# Patient Record
Sex: FEMALE | Race: WHITE | NOT HISPANIC OR LATINO | Employment: OTHER | URBAN - METROPOLITAN AREA
[De-identification: names, ages, dates, MRNs, and addresses within clinical notes are randomized per-mention and may not be internally consistent; named-entity substitution may affect disease eponyms.]

---

## 2018-06-20 ENCOUNTER — OFFICE VISIT (OUTPATIENT)
Dept: INTERNAL MEDICINE | Age: 33
End: 2018-06-20

## 2018-06-20 VITALS
WEIGHT: 194 LBS | HEART RATE: 86 BPM | TEMPERATURE: 97.7 F | HEIGHT: 65 IN | DIASTOLIC BLOOD PRESSURE: 82 MMHG | SYSTOLIC BLOOD PRESSURE: 148 MMHG | BODY MASS INDEX: 32.32 KG/M2 | OXYGEN SATURATION: 99 %

## 2018-06-20 DIAGNOSIS — F41.9 ANXIETY: ICD-10-CM

## 2018-06-20 DIAGNOSIS — M79.10 MYALGIA: ICD-10-CM

## 2018-06-20 DIAGNOSIS — R53.83 FATIGUE, UNSPECIFIED TYPE: Primary | ICD-10-CM

## 2018-06-20 PROCEDURE — 99203 OFFICE O/P NEW LOW 30 MIN: CPT | Performed by: NURSE PRACTITIONER

## 2018-06-20 RX ORDER — PROPRANOLOL HYDROCHLORIDE 10 MG/1
TABLET ORAL
Refills: 1 | COMMUNITY
Start: 2018-05-24 | End: 2021-03-14

## 2018-06-20 NOTE — PROGRESS NOTES
"[facility] [place]      Libby Huerta / 33 y.o. / female  06/20/2018      ASSESSMENT & PLAN:    Problem List Items Addressed This Visit     None      Visit Diagnoses     Fatigue, unspecified type    -  Primary    Relevant Orders    CBC & Differential    Comprehensive Metabolic Panel    TSH+Free T4    Vitamin D 25 Hydroxy    CHRIS    C-reactive Protein    Rheumatoid Factor    Sedimentation Rate    Anxiety        Relevant Orders    CBC & Differential    Comprehensive Metabolic Panel    TSH+Free T4    Vitamin D 25 Hydroxy    CHRIS    C-reactive Protein    Rheumatoid Factor    Sedimentation Rate    Myalgia        Relevant Orders    CBC & Differential    Comprehensive Metabolic Panel    TSH+Free T4    Vitamin D 25 Hydroxy    CHRIS    C-reactive Protein    Rheumatoid Factor    Sedimentation Rate        Orders Placed This Encounter   Procedures   • Comprehensive Metabolic Panel   • TSH+Free T4   • Vitamin D 25 Hydroxy   • CHRIS   • C-reactive Protein   • Rheumatoid Factor   • Sedimentation Rate   • CBC & Differential     No orders of the defined types were placed in this encounter.      Summary/Discussion:    1. Fatigue, unspecified type  Patient with multiple non-specific complaints of fatigue, myalgias, and generalized \"not feeling well\" for many months now. She has previously been diagnosed with anxiety and then bipolar disorder, but this was approximately 8-9 years ago since she has had any regular medical care. Discussed with patient Will obtain basic lab work today, including CBC, CMP, thyroid studies, vitamin D, inflammatory markers, and autoimmune markers to rule out any significant autoimmune or inflammatory process.  Discussed with patient that should her labs show no specific abnormalities, may need to consider further psychiatric evaluation and possible referral, which patient agrees to. Further evaluation and treatment pending results of labs.     - CBC & Differential  - Comprehensive Metabolic Panel  - TSH+Free " "T4  - Vitamin D 25 Hydroxy  - CHRIS  - C-reactive Protein  - Rheumatoid Factor  - Sedimentation Rate    2. Anxiety    - CBC & Differential  - Comprehensive Metabolic Panel  - TSH+Free T4  - Vitamin D 25 Hydroxy  - CHRIS  - C-reactive Protein  - Rheumatoid Factor  - Sedimentation Rate    3. Myalgia    - CBC & Differential  - Comprehensive Metabolic Panel  - TSH+Free T4  - Vitamin D 25 Hydroxy  - CHRIS  - C-reactive Protein  - Rheumatoid Factor  - Sedimentation Rate        Return in about 2 weeks (around 7/4/2018) for Next scheduled follow up.    ____________________________________________________________________    VITALS:    Visit Vitals  /82   Pulse 86   Temp 97.7 °F (36.5 °C)   Ht 165.1 cm (65\")   Wt 88 kg (194 lb)   SpO2 99%   BMI 32.28 kg/m²       BP Readings from Last 3 Encounters:   06/20/18 148/82     Wt Readings from Last 3 Encounters:   06/20/18 88 kg (194 lb)      Body mass index is 32.28 kg/m².    CC: Main reason(s) for today's visit: Establish Care (new pt to establish care) and Anxiety (pt reports anxiety attack x 1 month, w/ N/V, LOC, head injury; pt reports h/o anxiety; pt has been evaluated at [facility], who thought it might be thyroid related, but has not been tested yet d/t insurance)      HPI:    Patient is a 33 y.o. female who is here to establish care. Previous PCP was Dr. Medeiros in [place]. She had previously been placed on antidepressants in the past, but did not want to continue because it made her \"feel numb\". She also reports she left this practice as she was told that she had bipolar disorder and was started on lithium, but she did not agree with the provider's diagnosis.     Patient reports history of anxiety issues, cold intolerance, fatigue, chronic episodes of \"just feeling bad\". She reports a recent anxiety/panic attack that caused her to feel weak and nauseous and she reports that she passed out while standing over the kitchen sink and woke up and she had vomited on herself. She " is not sure how long her LOC lasted. She was seen recently by a provider [facility] and was told by the provider that she needed to have her thyroid levels checked as a possible cause of her depression and anxiety and her recent episode of blacking out.       Patient Care Team:  JOSE Clayton as PCP - General (Internal Medicine)  ____________________________________________________________________      REVIEW OF SYSTEMS    Review of Systems   Constitutional: Positive for fatigue. Negative for chills, fever and unexpected weight change.   Eyes: Negative for visual disturbance.   Respiratory: Negative for cough and shortness of breath.    Cardiovascular: Negative for chest pain, palpitations and leg swelling.   Gastrointestinal: Positive for nausea. Negative for abdominal pain, constipation and diarrhea.   Endocrine: Positive for cold intolerance. Negative for heat intolerance, polydipsia, polyphagia and polyuria.   Genitourinary: Negative for frequency and urgency.   Musculoskeletal: Positive for myalgias (intermittent aches in AM, usually to bilateral legs, not localized to any specific joints. ). Negative for arthralgias, back pain, neck pain and neck stiffness.   Skin: Negative for rash.   Neurological: Positive for headaches (3-5x/ weekly, chronic ). Negative for dizziness, tremors, seizures, syncope, facial asymmetry, speech difficulty, weakness, light-headedness and numbness.         PHYSICAL EXAMINATION    Physical Exam   Constitutional: She is oriented to person, place, and time. Vital signs are normal. She appears well-developed and well-nourished. She is cooperative. She does not appear ill. No distress.   Neck: No thyroid mass and no thyromegaly present.   Cardiovascular: Normal rate, regular rhythm, S1 normal, S2 normal and normal heart sounds.    No murmur heard.  Pulmonary/Chest: Effort normal and breath sounds normal. She has no decreased breath sounds. She has no  wheezes. She has no rhonchi. She has no rales.   Neurological: She is alert and oriented to person, place, and time.   Skin: Skin is warm, dry and intact.   Psychiatric: Her speech is normal and behavior is normal. Judgment and thought content normal. Her mood appears anxious. Cognition and memory are normal.   Nursing note and vitals reviewed.      REVIEWED DATA:    Labs:   No results found for: NA, K, AST, ALT, BUN, CREATININE, EGFRIFNONA, EGFRIFAFRI    No results found for: GLUCOSE, HGBA1C, MICROALBUR    No results found for: LDL, HDL, TRIG, CHOLHDLRATIO    No results found for: TSH, FREET4     No results found for: WBC, HGB, PLT      Imaging:        Medical Tests:        Summary of old records / correspondence / consultant report:        Request outside records:        ______________________________________________________________________    ALLERGIES  No Known Allergies     MEDICATIONS  Current Outpatient Prescriptions   Medication Sig Dispense Refill   • propranolol (INDERAL) 10 MG tablet TK 1 T PO BID PRN  1     No current facility-administered medications for this visit.        PFSH:     The following portions of the patient's history were reviewed and updated as appropriate: Allergies / Current Medications / Past Medical History / Surgical History / Social History / Family History    PROBLEM LIST   There is no problem list on file for this patient.      PAST MEDICAL HISTORY  Past Medical History:   Diagnosis Date   • Anxiety    • Asthma     childhood asthma       SURGICAL HISTORY  Past Surgical History:   Procedure Laterality Date   • LASIK     • TONSILLECTOMY         SOCIAL HISTORY  Social History     Social History   • Marital status: Single     Social History Main Topics   • Smoking status: Former Smoker   • Smokeless tobacco: Never Used   • Alcohol use 0.6 oz/week     1 Glasses of wine per week      Comment: rare   • Drug use: No   • Sexual activity: Yes     Partners: Male     Other Topics Concern   •  Not on file       FAMILY HISTORY  Family History   Problem Relation Age of Onset   • Celiac disease Mother    • Cancer Mother    • Cervical cancer Mother    • Thyroid disease Maternal Grandmother    • Crohn's disease Maternal Grandfather          **Cyn Disclaimer:   Much of this encounter note is an electronic transcription/translation of spoken language to printed text. The electronic translation of spoken language may permit erroneous, or at times, nonsensical words or phrases to be inadvertently transcribed. Although I have reviewed the note for such errors, some may still exist.

## 2018-06-20 NOTE — PATIENT INSTRUCTIONS
Fatigue    Fatigue is feeling tired all of the time, a lack of energy, or a lack of motivation. Occasional or mild fatigue is often a normal response to activity or life in general. However, long-lasting (chronic) or extreme fatigue may indicate an underlying medical condition.  Follow these instructions at home:  Watch your fatigue for any changes. The following actions may help to lessen any discomfort you are feeling:  · Talk to your health care provider about how much sleep you need each night. Try to get the required amount every night.  · Take medicines only as directed by your health care provider.  · Eat a healthy and nutritious diet. Ask your health care provider if you need help changing your diet.  · Drink enough fluid to keep your urine clear or pale yellow.  · Practice ways of relaxing, such as yoga, meditation, massage therapy, or acupuncture.  · Exercise regularly.  · Change situations that cause you stress. Try to keep your work and personal routine reasonable.  · Do not abuse illegal drugs.  · Limit alcohol intake to no more than 1 drink per day for nonpregnant women and 2 drinks per day for men. One drink equals 12 ounces of beer, 5 ounces of wine, or 1½ ounces of hard liquor.  · Take a multivitamin, if directed by your health care provider.    Contact a health care provider if:  · Your fatigue does not get better.  · You have a fever.  · You have unintentional weight loss or gain.  · You have headaches.  · You have difficulty:  ? Falling asleep.  ? Sleeping throughout the night.  · You feel angry, guilty, anxious, or sad.  · You are unable to have a bowel movement (constipation).  · You skin is dry.  · Your legs or another part of your body is swollen.  Get help right away if:  · You feel confused.  · Your vision is blurry.  · You feel faint or pass out.  · You have a severe headache.  · You have severe abdominal, pelvic, or back pain.  · You have chest pain, shortness of breath, or an irregular  or fast heartbeat.  · You are unable to urinate or you urinate less than normal.  · You develop abnormal bleeding, such as bleeding from the rectum, vagina, nose, lungs, or nipples.  · You vomit blood.  · You have thoughts about harming yourself or committing suicide.  · You are worried that you might harm someone else.  This information is not intended to replace advice given to you by your health care provider. Make sure you discuss any questions you have with your health care provider.  Document Released: 10/14/2008 Document Revised: 05/25/2017 Document Reviewed: 04/21/2015  [employer] Interactive Patient Education © 2018 [employer]

## 2018-06-21 DIAGNOSIS — M35.3 POLYMYALGIA (HCC): ICD-10-CM

## 2018-06-21 DIAGNOSIS — R53.83 FATIGUE, UNSPECIFIED TYPE: Primary | ICD-10-CM

## 2018-06-21 DIAGNOSIS — R76.8 ELEVATED RHEUMATOID FACTOR: ICD-10-CM

## 2018-06-21 LAB
25(OH)D3+25(OH)D2 SERPL-MCNC: 49 NG/ML (ref 30–100)
ALBUMIN SERPL-MCNC: 4.4 G/DL (ref 3.5–5.2)
ALBUMIN/GLOB SERPL: 1.4 G/DL
ALP SERPL-CCNC: 49 U/L (ref 39–117)
ALT SERPL-CCNC: 15 U/L (ref 1–33)
ANA SER QL: NEGATIVE
AST SERPL-CCNC: 12 U/L (ref 1–32)
BASOPHILS # BLD AUTO: 0.02 10*3/MM3 (ref 0–0.2)
BASOPHILS NFR BLD AUTO: 0.2 % (ref 0–1.5)
BILIRUB SERPL-MCNC: 0.4 MG/DL (ref 0.1–1.2)
BUN SERPL-MCNC: 12 MG/DL (ref 6–20)
BUN/CREAT SERPL: 14.8 (ref 7–25)
CALCIUM SERPL-MCNC: 9.4 MG/DL (ref 8.6–10.5)
CHLORIDE SERPL-SCNC: 103 MMOL/L (ref 98–107)
CO2 SERPL-SCNC: 23 MMOL/L (ref 22–29)
CREAT SERPL-MCNC: 0.81 MG/DL (ref 0.57–1)
CRP SERPL-MCNC: 0.64 MG/DL (ref 0–0.5)
EOSINOPHIL # BLD AUTO: 0.32 10*3/MM3 (ref 0–0.7)
EOSINOPHIL NFR BLD AUTO: 3.7 % (ref 0.3–6.2)
ERYTHROCYTE [DISTWIDTH] IN BLOOD BY AUTOMATED COUNT: 12.9 % (ref 11.7–13)
ERYTHROCYTE [SEDIMENTATION RATE] IN BLOOD BY WESTERGREN METHOD: 20 MM/HR (ref 0–20)
GFR SERPLBLD CREATININE-BSD FMLA CKD-EPI: 100 ML/MIN/1.73
GFR SERPLBLD CREATININE-BSD FMLA CKD-EPI: 82 ML/MIN/1.73
GLOBULIN SER CALC-MCNC: 3.1 GM/DL
GLUCOSE SERPL-MCNC: 90 MG/DL (ref 65–99)
HCT VFR BLD AUTO: 42.7 % (ref 35.6–45.5)
HGB BLD-MCNC: 13.5 G/DL (ref 11.9–15.5)
IMM GRANULOCYTES # BLD: 0 10*3/MM3 (ref 0–0.03)
IMM GRANULOCYTES NFR BLD: 0 % (ref 0–0.5)
LYMPHOCYTES # BLD AUTO: 2.26 10*3/MM3 (ref 0.9–4.8)
LYMPHOCYTES NFR BLD AUTO: 26.1 % (ref 19.6–45.3)
MCH RBC QN AUTO: 30.5 PG (ref 26.9–32)
MCHC RBC AUTO-ENTMCNC: 31.6 G/DL (ref 32.4–36.3)
MCV RBC AUTO: 96.4 FL (ref 80.5–98.2)
MONOCYTES # BLD AUTO: 0.59 10*3/MM3 (ref 0.2–1.2)
MONOCYTES NFR BLD AUTO: 6.8 % (ref 5–12)
NEUTROPHILS # BLD AUTO: 5.46 10*3/MM3 (ref 1.9–8.1)
NEUTROPHILS NFR BLD AUTO: 63.2 % (ref 42.7–76)
PLATELET # BLD AUTO: 310 10*3/MM3 (ref 140–500)
POTASSIUM SERPL-SCNC: 4.9 MMOL/L (ref 3.5–5.2)
PROT SERPL-MCNC: 7.5 G/DL (ref 6–8.5)
RBC # BLD AUTO: 4.43 10*6/MM3 (ref 3.9–5.2)
RHEUMATOID FACT SERPL-ACNC: 22.1 IU/ML (ref 0–13.9)
SODIUM SERPL-SCNC: 141 MMOL/L (ref 136–145)
T4 FREE SERPL-MCNC: 1.19 NG/DL (ref 0.93–1.7)
TSH SERPL DL<=0.005 MIU/L-ACNC: 2.21 MIU/ML (ref 0.27–4.2)
WBC # BLD AUTO: 8.65 10*3/MM3 (ref 4.5–10.7)

## 2018-07-05 ENCOUNTER — OFFICE VISIT (OUTPATIENT)
Dept: INTERNAL MEDICINE | Age: 33
End: 2018-07-05

## 2018-07-05 VITALS
WEIGHT: 192.4 LBS | HEIGHT: 65 IN | OXYGEN SATURATION: 99 % | SYSTOLIC BLOOD PRESSURE: 130 MMHG | DIASTOLIC BLOOD PRESSURE: 82 MMHG | HEART RATE: 81 BPM | TEMPERATURE: 97 F | BODY MASS INDEX: 32.06 KG/M2

## 2018-07-05 DIAGNOSIS — F41.9 ANXIETY: ICD-10-CM

## 2018-07-05 DIAGNOSIS — R79.82 ELEVATED C-REACTIVE PROTEIN (CRP): Primary | ICD-10-CM

## 2018-07-05 DIAGNOSIS — R76.8 RHEUMATOID FACTOR POSITIVE: ICD-10-CM

## 2018-07-05 PROCEDURE — 99214 OFFICE O/P EST MOD 30 MIN: CPT | Performed by: NURSE PRACTITIONER

## 2018-07-05 NOTE — PROGRESS NOTES
"Subjective   Libby Huerta is a 33 y.o. female.     Anxiety   Presents for follow-up visit. Symptoms include decreased concentration, excessive worry, irritability, nausea, nervous/anxious behavior, obsessions, palpitations and panic. Patient reports no depressed mood, dizziness or suicidal ideas. Symptoms occur constantly. The severity of symptoms is causing significant distress (has been significantly worse over the past 2 months).        She has been treated in the past with oral medication (unsure which SSRI) but stopped taking them on her own because she did not like the way they made her feel \"numb\".   She continues to have symptoms of fatigue, generalized myalgia. She reports having had recent testing last year for celiac disease, and bloodwork was positive. She has not had any further workup at this point by GI.   She has a family history of RA in grandmother, mother has hx of celiac.   Lab work done at last visit demonstrated mild elevation in CRP and positive rheumatoid factor, although other autoimmune markers were negative. Myalgias are generalized, no specific joint aches, no AM pain or swelling of hands or feet.     The following portions of the patient's history were reviewed and updated as appropriate: allergies, current medications, past family history, past medical history, past social history, past surgical history and problem list.    Review of Systems   Constitutional: Positive for fatigue and irritability. Negative for chills and fever.   Cardiovascular: Positive for palpitations.   Gastrointestinal: Positive for diarrhea and nausea. Negative for constipation and vomiting.   Neurological: Negative for dizziness.   Psychiatric/Behavioral: Positive for decreased concentration. Negative for dysphoric mood, self-injury, sleep disturbance, suicidal ideas, depressed mood and stress. The patient is nervous/anxious.        Objective   Physical Exam   Constitutional: She appears well-developed and " well-nourished. She is active. She does not appear ill. No distress.   Cardiovascular: Normal rate, regular rhythm and normal heart sounds.    Pulmonary/Chest: Effort normal and breath sounds normal. She has no decreased breath sounds. She has no wheezes. She has no rhonchi. She has no rales.   Neurological: She is alert.   Psychiatric: Her mood appears anxious.   Nursing note and vitals reviewed.        Assessment/Plan   Problems Addressed this Visit     None      Visit Diagnoses     Elevated C-reactive protein (CRP)    -  Primary    Relevant Orders    Anti-dsDNA Antibodies    Cyclic Citrul Peptide Antibody, IgG / IgA    C4+C3    Ambulatory Referral to Rheumatology    Rheumatoid factor positive        Relevant Orders    Anti-dsDNA Antibodies    Cyclic Citrul Peptide Antibody, IgG / IgA    C4+C3    Ambulatory Referral to Rheumatology    Anxiety        Relevant Medications    sertraline (ZOLOFT) 50 MG tablet    Other Relevant Orders    Ambulatory Referral to Behavioral Health        1. Elevated C-reactive protein (CRP)  Patient with ongoing c/o fatigue, generalized myalgias, family history autoimmune issues and positive RF and CRP. Will obtain further testing today for C3, C4, anti-CCP, Anti-DsDNA (CHRIS negative). I had placed rheumatology referral previously, but they cannot see her until October. I will place another referral today to see if patient can be seen sooner with another provider.     - Anti-dsDNA Antibodies  - Cyclic Citrul Peptide Antibody, IgG / IgA  - C4+C3  - Ambulatory Referral to Rheumatology    2. Rheumatoid factor positive    - Anti-dsDNA Antibodies  - Cyclic Citrul Peptide Antibody, IgG / IgA  - C4+C3  - Ambulatory Referral to Rheumatology    3. Anxiety  Patient has significant anxiety, worse in the past 2 months. She is very hesitant to start medication as she did not respond well in the past. Discussed with patient that I think that she would be best benefited with a combination of  pharmacologic therapy and counseling. Will start low dose Zoloft 25mg, to be increased to 50mg daily as tolerated. Referral placed to behavioral health for counseling in addition to pharmacotherapy.     - Ambulatory Referral to Behavioral Health  - sertraline (ZOLOFT) 50 MG tablet; Take 1 tablet by mouth Daily. Take 1/2 tablet by mouth daily x 7 days, then increase to 1 tablet daily.  Dispense: 30 tablet; Refill: 5

## 2018-07-05 NOTE — PATIENT INSTRUCTIONS
Generalized Anxiety Disorder, Adult    Generalized anxiety disorder (AUSTIN) is a mental health disorder. People with this condition constantly worry about everyday events. Unlike normal anxiety, worry related to AUSTIN is not triggered by a specific event. These worries also do not fade or get better with time. AUSTIN interferes with life functions, including relationships, work, and school.  AUSTIN can vary from mild to severe. People with severe AUSTIN can have intense waves of anxiety with physical symptoms (panic attacks).  What are the causes?  The exact cause of AUSTIN is not known.  What increases the risk?  This condition is more likely to develop in:  · Women.  · People who have a family history of anxiety disorders.  · People who are very shy.  · People who experience very stressful life events, such as the death of a loved one.  · People who have a very stressful family environment.    What are the signs or symptoms?  People with AUSTIN often worry excessively about many things in their lives, such as their health and family. They may also be overly concerned about:  · Doing well at work.  · Being on time.  · Natural disasters.  · Friendships.    Physical symptoms of AUSTIN include:  · Fatigue.  · Muscle tension or having muscle twitches.  · Trembling or feeling shaky.  · Being easily startled.  · Feeling like your heart is pounding or racing.  · Feeling out of breath or like you cannot take a deep breath.  · Having trouble falling asleep or staying asleep.  · Sweating.  · Nausea, diarrhea, or irritable bowel syndrome (IBS).  · Headaches.  · Trouble concentrating or remembering facts.  · Restlessness.  · Irritability.    How is this diagnosed?  Your health care provider can diagnose AUSTIN based on your symptoms and medical history. You will also have a physical exam. The health care provider will ask specific questions about your symptoms, including how severe they are, when they started, and if they come and go. Your health care  provider may ask you about your use of alcohol or drugs, including prescription medicines. Your health care provider may refer you to a mental health specialist for further evaluation.  Your health care provider will do a thorough examination and may perform additional tests to rule out other possible causes of your symptoms.  To be diagnosed with AUSTIN, a person must have anxiety that:  · Is out of his or her control.  · Affects several different aspects of his or her life, such as work and relationships.  · Causes distress that makes him or her unable to take part in normal activities.  · Includes at least three physical symptoms of AUSTIN, such as restlessness, fatigue, trouble concentrating, irritability, muscle tension, or sleep problems.    Before your health care provider can confirm a diagnosis of AUSTIN, these symptoms must be present more days than they are not, and they must last for six months or longer.  How is this treated?  The following therapies are usually used to treat AUSTIN:  · Medicine. Antidepressant medicine is usually prescribed for long-term daily control. Antianxiety medicines may be added in severe cases, especially when panic attacks occur.  · Talk therapy (psychotherapy). Certain types of talk therapy can be helpful in treating AUSTIN by providing support, education, and guidance. Options include:  ? Cognitive behavioral therapy (CBT). People learn coping skills and techniques to ease their anxiety. They learn to identify unrealistic or negative thoughts and behaviors and to replace them with positive ones.  ? Acceptance and commitment therapy (ACT). This treatment teaches people how to be mindful as a way to cope with unwanted thoughts and feelings.  ? Biofeedback. This process trains you to manage your body's response (physiological response) through breathing techniques and relaxation methods. You will work with a therapist while machines are used to monitor your physical symptoms.  · Stress  management techniques. These include yoga, meditation, and exercise.    A mental health specialist can help determine which treatment is best for you. Some people see improvement with one type of therapy. However, other people require a combination of therapies.  Follow these instructions at home:  · Take over-the-counter and prescription medicines only as told by your health care provider.  · Try to maintain a normal routine.  · Try to anticipate stressful situations and allow extra time to manage them.  · Practice any stress management or self-calming techniques as taught by your health care provider.  · Do not punish yourself for setbacks or for not making progress.  · Try to recognize your accomplishments, even if they are small.  · Keep all follow-up visits as told by your health care provider. This is important.  Contact a health care provider if:  · Your symptoms do not get better.  · Your symptoms get worse.  · You have signs of depression, such as:  ? A persistently sad, cranky, or irritable mood.  ? Loss of enjoyment in activities that used to bring you adeline.  ? Change in weight or eating.  ? Changes in sleeping habits.  ? Avoiding friends or family members.  ? Loss of energy for normal tasks.  ? Feelings of guilt or worthlessness.  Get help right away if:  · You have serious thoughts about hurting yourself or others.  If you ever feel like you may hurt yourself or others, or have thoughts about taking your own life, get help right away. You can go to your nearest emergency department or call:  · Your local emergency services (911 in the [place]  · A suicide crisis helpline, such as the National Suicide Prevention Lifeline at 1-293.478.9530. This is open 24 hours a day.    Summary  · Generalized anxiety disorder (AUSTIN) is a mental health disorder that involves worry that is not triggered by a specific event.  · People with AUSTIN often worry excessively about many things in their lives, such as their health and  family.  · AUSTIN may cause physical symptoms such as restlessness, trouble concentrating, sleep problems, frequent sweating, nausea, diarrhea, headaches, and trembling or muscle twitching.  · A mental health specialist can help determine which treatment is best for you. Some people see improvement with one type of therapy. However, other people require a combination of therapies.  This information is not intended to replace advice given to you by your health care provider. Make sure you discuss any questions you have with your health care provider.  Document Released: 04/14/2014 Document Revised: 11/07/2017 Document Reviewed: 11/07/2017  [employer] Interactive Patient Education © 2018 [employer]

## 2018-07-06 DIAGNOSIS — F41.9 ANXIETY: ICD-10-CM

## 2018-07-07 LAB
C3 SERPL-MCNC: 141 MG/DL (ref 82–167)
C4 SERPL-MCNC: 31 MG/DL (ref 14–44)
CCP IGA+IGG SERPL IA-ACNC: 8 UNITS (ref 0–19)

## 2018-07-09 ENCOUNTER — TELEPHONE (OUTPATIENT)
Dept: INTERNAL MEDICINE | Age: 33
End: 2018-07-09

## 2018-07-09 NOTE — TELEPHONE ENCOUNTER
I placed another referral to see if we could get her into different rheumatology, does not necessarily have to be seen by Dr. Esquivel. Can you please look into this for me?

## 2018-07-09 NOTE — TELEPHONE ENCOUNTER
Pt called w/ question re: referral to rheumatology.    Pt is unable to get in to see rheumatologist, Marcelo Esquivel, until Oct.    Please advise.    KD

## 2018-07-10 ENCOUNTER — TELEPHONE (OUTPATIENT)
Dept: INTERNAL MEDICINE | Age: 33
End: 2018-07-10

## 2018-07-10 NOTE — TELEPHONE ENCOUNTER
FYI patient states that you have given her a RX for Sertraline. She said that she isn't going to take it because she doesn't do well with taking an SSRI. She is going to schedule herself with a psychiatrist and see what they say.

## 2018-07-10 NOTE — TELEPHONE ENCOUNTER
[facility] can't see until November. Please advise what you would like for us to do.

## 2021-03-14 ENCOUNTER — ANESTHESIA (OUTPATIENT)
Dept: PERIOP | Facility: HOSPITAL | Age: 36
End: 2021-03-14

## 2021-03-14 ENCOUNTER — APPOINTMENT (OUTPATIENT)
Dept: CT IMAGING | Facility: HOSPITAL | Age: 36
End: 2021-03-14

## 2021-03-14 ENCOUNTER — HOSPITAL ENCOUNTER (INPATIENT)
Facility: HOSPITAL | Age: 36
LOS: 8 days | Discharge: HOME | End: 2021-03-22
Attending: EMERGENCY MEDICINE | Admitting: STUDENT IN AN ORGANIZED HEALTH CARE EDUCATION/TRAINING PROGRAM

## 2021-03-14 ENCOUNTER — ANESTHESIA EVENT (OUTPATIENT)
Dept: PERIOP | Facility: HOSPITAL | Age: 36
End: 2021-03-14

## 2021-03-14 DIAGNOSIS — R09.89 ABNORMAL FINDING OF LUNG: ICD-10-CM

## 2021-03-14 DIAGNOSIS — R10.9 ABDOMINAL PAIN: ICD-10-CM

## 2021-03-14 DIAGNOSIS — K37 APPENDICITIS: ICD-10-CM

## 2021-03-14 DIAGNOSIS — K35.33 ACUTE APPENDICITIS WITH LOCALIZED PERITONITIS AND ABSCESS, WITHOUT GANGRENE, UNSPECIFIED WHETHER PERFORATION PRESENT: Primary | ICD-10-CM

## 2021-03-14 LAB
ALBUMIN SERPL-MCNC: 3.3 G/DL (ref 3.5–5.2)
ALBUMIN/GLOB SERPL: 0.8 G/DL
ALP SERPL-CCNC: 88 U/L (ref 39–117)
ALT SERPL W P-5'-P-CCNC: 26 U/L (ref 1–33)
ANION GAP SERPL CALCULATED.3IONS-SCNC: 11.2 MMOL/L (ref 5–15)
AST SERPL-CCNC: 10 U/L (ref 1–32)
BACTERIA UR QL AUTO: NORMAL /HPF
BASOPHILS # BLD AUTO: 0.07 10*3/MM3 (ref 0–0.2)
BASOPHILS NFR BLD AUTO: 0.4 % (ref 0–1.5)
BILIRUB SERPL-MCNC: 0.9 MG/DL (ref 0–1.2)
BILIRUB UR QL STRIP: NEGATIVE
BUN SERPL-MCNC: 13 MG/DL (ref 6–20)
BUN/CREAT SERPL: 11.7 (ref 7–25)
CALCIUM SPEC-SCNC: 8.9 MG/DL (ref 8.6–10.5)
CHLORIDE SERPL-SCNC: 99 MMOL/L (ref 98–107)
CLARITY UR: CLEAR
CO2 SERPL-SCNC: 23.8 MMOL/L (ref 22–29)
COLOR UR: YELLOW
CREAT SERPL-MCNC: 1.11 MG/DL (ref 0.57–1)
D-LACTATE SERPL-SCNC: 2.3 MMOL/L (ref 0.5–2)
DEPRECATED RDW RBC AUTO: 38.3 FL (ref 37–54)
EOSINOPHIL # BLD AUTO: 0 10*3/MM3 (ref 0–0.4)
EOSINOPHIL NFR BLD AUTO: 0 % (ref 0.3–6.2)
ERYTHROCYTE [DISTWIDTH] IN BLOOD BY AUTOMATED COUNT: 12 % (ref 12.3–15.4)
GFR SERPL CREATININE-BSD FRML MDRD: 57 ML/MIN/1.73
GLOBULIN UR ELPH-MCNC: 4.4 GM/DL
GLUCOSE SERPL-MCNC: 112 MG/DL (ref 65–99)
GLUCOSE UR STRIP-MCNC: NEGATIVE MG/DL
HCG SERPL QL: NEGATIVE
HCT VFR BLD AUTO: 35.1 % (ref 34–46.6)
HGB BLD-MCNC: 11.6 G/DL (ref 12–15.9)
HGB UR QL STRIP.AUTO: ABNORMAL
HYALINE CASTS UR QL AUTO: NORMAL /LPF
IMM GRANULOCYTES # BLD AUTO: 0.11 10*3/MM3 (ref 0–0.05)
IMM GRANULOCYTES NFR BLD AUTO: 0.6 % (ref 0–0.5)
KETONES UR QL STRIP: NEGATIVE
LEUKOCYTE ESTERASE UR QL STRIP.AUTO: NEGATIVE
LIPASE SERPL-CCNC: 19 U/L (ref 13–60)
LYMPHOCYTES # BLD AUTO: 1.24 10*3/MM3 (ref 0.7–3.1)
LYMPHOCYTES NFR BLD AUTO: 6.2 % (ref 19.6–45.3)
MCH RBC QN AUTO: 29.1 PG (ref 26.6–33)
MCHC RBC AUTO-ENTMCNC: 33 G/DL (ref 31.5–35.7)
MCV RBC AUTO: 88 FL (ref 79–97)
MONOCYTES # BLD AUTO: 0.62 10*3/MM3 (ref 0.1–0.9)
MONOCYTES NFR BLD AUTO: 3.1 % (ref 5–12)
NEUTROPHILS NFR BLD AUTO: 17.88 10*3/MM3 (ref 1.7–7)
NEUTROPHILS NFR BLD AUTO: 89.7 % (ref 42.7–76)
NITRITE UR QL STRIP: NEGATIVE
NRBC BLD AUTO-RTO: 0 /100 WBC (ref 0–0.2)
PH UR STRIP.AUTO: 6.5 [PH] (ref 5–8)
PLATELET # BLD AUTO: 397 10*3/MM3 (ref 140–450)
PMV BLD AUTO: 9.6 FL (ref 6–12)
POTASSIUM SERPL-SCNC: 4.1 MMOL/L (ref 3.5–5.2)
PROCALCITONIN SERPL-MCNC: 8.96 NG/ML (ref 0–0.25)
PROT SERPL-MCNC: 7.7 G/DL (ref 6–8.5)
PROT UR QL STRIP: ABNORMAL
RBC # BLD AUTO: 3.99 10*6/MM3 (ref 3.77–5.28)
RBC # UR: NORMAL /HPF
REF LAB TEST METHOD: NORMAL
SARS-COV-2 RNA RESP QL NAA+PROBE: NOT DETECTED
SODIUM SERPL-SCNC: 134 MMOL/L (ref 136–145)
SP GR UR STRIP: 1.03 (ref 1–1.03)
SQUAMOUS #/AREA URNS HPF: NORMAL /HPF
UROBILINOGEN UR QL STRIP: ABNORMAL
WBC # BLD AUTO: 19.92 10*3/MM3 (ref 3.4–10.8)
WBC UR QL AUTO: NORMAL /HPF

## 2021-03-14 PROCEDURE — 88304 TISSUE EXAM BY PATHOLOGIST: CPT | Performed by: STUDENT IN AN ORGANIZED HEALTH CARE EDUCATION/TRAINING PROGRAM

## 2021-03-14 PROCEDURE — 0DTJ4ZZ RESECTION OF APPENDIX, PERCUTANEOUS ENDOSCOPIC APPROACH: ICD-10-PCS | Performed by: STUDENT IN AN ORGANIZED HEALTH CARE EDUCATION/TRAINING PROGRAM

## 2021-03-14 PROCEDURE — U0003 INFECTIOUS AGENT DETECTION BY NUCLEIC ACID (DNA OR RNA); SEVERE ACUTE RESPIRATORY SYNDROME CORONAVIRUS 2 (SARS-COV-2) (CORONAVIRUS DISEASE [COVID-19]), AMPLIFIED PROBE TECHNIQUE, MAKING USE OF HIGH THROUGHPUT TECHNOLOGIES AS DESCRIBED BY CMS-2020-01-R: HCPCS | Performed by: PHYSICIAN ASSISTANT

## 2021-03-14 PROCEDURE — 25010000002 PROPOFOL 10 MG/ML EMULSION: Performed by: ANESTHESIOLOGY

## 2021-03-14 PROCEDURE — 25010000002 SUCCINYLCHOLINE PER 20 MG: Performed by: ANESTHESIOLOGY

## 2021-03-14 PROCEDURE — 25010000002 KETOROLAC TROMETHAMINE PER 15 MG: Performed by: ANESTHESIOLOGY

## 2021-03-14 PROCEDURE — 74177 CT ABD & PELVIS W/CONTRAST: CPT

## 2021-03-14 PROCEDURE — 99223 1ST HOSP IP/OBS HIGH 75: CPT | Performed by: STUDENT IN AN ORGANIZED HEALTH CARE EDUCATION/TRAINING PROGRAM

## 2021-03-14 PROCEDURE — 83690 ASSAY OF LIPASE: CPT | Performed by: PHYSICIAN ASSISTANT

## 2021-03-14 PROCEDURE — 83605 ASSAY OF LACTIC ACID: CPT | Performed by: PHYSICIAN ASSISTANT

## 2021-03-14 PROCEDURE — 25010000002 PIPERACILLIN SOD-TAZOBACTAM PER 1 G: Performed by: PHYSICIAN ASSISTANT

## 2021-03-14 PROCEDURE — 25010000002 LORAZEPAM PER 2 MG: Performed by: EMERGENCY MEDICINE

## 2021-03-14 PROCEDURE — 99284 EMERGENCY DEPT VISIT MOD MDM: CPT

## 2021-03-14 PROCEDURE — 25010000002 FENTANYL CITRATE (PF) 100 MCG/2ML SOLUTION: Performed by: ANESTHESIOLOGY

## 2021-03-14 PROCEDURE — 25010000002 PHENYLEPHRINE PER 1 ML: Performed by: ANESTHESIOLOGY

## 2021-03-14 PROCEDURE — 25010000002 DEXAMETHASONE PER 1 MG: Performed by: ANESTHESIOLOGY

## 2021-03-14 PROCEDURE — 85025 COMPLETE CBC W/AUTO DIFF WBC: CPT | Performed by: PHYSICIAN ASSISTANT

## 2021-03-14 PROCEDURE — 25010000002 HYDROMORPHONE PER 4 MG: Performed by: ANESTHESIOLOGY

## 2021-03-14 PROCEDURE — 80053 COMPREHEN METABOLIC PANEL: CPT | Performed by: PHYSICIAN ASSISTANT

## 2021-03-14 PROCEDURE — 25010000002 ONDANSETRON PER 1 MG: Performed by: ANESTHESIOLOGY

## 2021-03-14 PROCEDURE — 25010000002 MIDAZOLAM PER 1 MG: Performed by: ANESTHESIOLOGY

## 2021-03-14 PROCEDURE — 25010000002 IOPAMIDOL 61 % SOLUTION: Performed by: EMERGENCY MEDICINE

## 2021-03-14 PROCEDURE — 84145 PROCALCITONIN (PCT): CPT | Performed by: PHYSICIAN ASSISTANT

## 2021-03-14 PROCEDURE — 87040 BLOOD CULTURE FOR BACTERIA: CPT | Performed by: PHYSICIAN ASSISTANT

## 2021-03-14 PROCEDURE — 83605 ASSAY OF LACTIC ACID: CPT | Performed by: STUDENT IN AN ORGANIZED HEALTH CARE EDUCATION/TRAINING PROGRAM

## 2021-03-14 PROCEDURE — 36415 COLL VENOUS BLD VENIPUNCTURE: CPT | Performed by: STUDENT IN AN ORGANIZED HEALTH CARE EDUCATION/TRAINING PROGRAM

## 2021-03-14 PROCEDURE — 84703 CHORIONIC GONADOTROPIN ASSAY: CPT | Performed by: PHYSICIAN ASSISTANT

## 2021-03-14 PROCEDURE — 81001 URINALYSIS AUTO W/SCOPE: CPT | Performed by: PHYSICIAN ASSISTANT

## 2021-03-14 PROCEDURE — 44970 LAPAROSCOPY APPENDECTOMY: CPT | Performed by: STUDENT IN AN ORGANIZED HEALTH CARE EDUCATION/TRAINING PROGRAM

## 2021-03-14 DEVICE — THE ECHELON, ECHELON ENDOPATH™ AND ECHELON FLEX™ FAMILIES OF ENDOSCOPIC LINEAR CUTTERS AND RELOADS ARE STERILE, SINGLE PATIENT USE INSTRUMENTS THAT SIMULTANEOUSLY CUT AND STAPLE TISSUE. THERE ARE SIX STAGGERED ROWS OF STAPLES, THREE ON EITHER SIDE OF THE CUT LINE. THE 45 MM INSTRUMENTS HAVE A STAPLE LINE THATIS APPROXIMATELY 45 MM LONG AND A CUT LINE THAT IS APPROXIMATELY 42 MM LONG. THE SHAFT CAN ROTATE FREELY IN BOTH DIRECTIONS AND AN ARTICULATION MECHANISM ON ARTICULATING INSTRUMENTS ENABLES BENDING THE DISTAL PORTIONOF THE SHAFT TO FACILITATE LATERAL ACCESS OF THE OPERATIVE SITE.THE INSTRUMENTS ARE SHIPPED WITHOUT A RELOAD AND MUST BE LOADED PRIOR TO USE. A STAPLE RETAINING CAP ON THE RELOAD PROTECTS THE STAPLE LEG POINTS DURING SHIPPING AND TRANSPORTATION. THE INSTRUMENTS’ LOCK-OUT FEATURE IS DESIGNED TO PREVENT A USED RELOAD FROM BEING REFIRED.
Type: IMPLANTABLE DEVICE | Site: ABDOMEN | Status: FUNCTIONAL
Brand: ECHELON ENDOPATH

## 2021-03-14 RX ORDER — HYDROMORPHONE HCL 110MG/55ML
PATIENT CONTROLLED ANALGESIA SYRINGE INTRAVENOUS AS NEEDED
Status: DISCONTINUED | OUTPATIENT
Start: 2021-03-14 | End: 2021-03-14 | Stop reason: SURG

## 2021-03-14 RX ORDER — SODIUM CHLORIDE, SODIUM LACTATE, POTASSIUM CHLORIDE, CALCIUM CHLORIDE 600; 310; 30; 20 MG/100ML; MG/100ML; MG/100ML; MG/100ML
9 INJECTION, SOLUTION INTRAVENOUS CONTINUOUS
Status: CANCELLED | OUTPATIENT
Start: 2021-03-14

## 2021-03-14 RX ORDER — NALOXONE HCL 0.4 MG/ML
0.2 VIAL (ML) INJECTION AS NEEDED
Status: DISCONTINUED | OUTPATIENT
Start: 2021-03-14 | End: 2021-03-14 | Stop reason: HOSPADM

## 2021-03-14 RX ORDER — MIDAZOLAM HYDROCHLORIDE 1 MG/ML
1 INJECTION INTRAMUSCULAR; INTRAVENOUS
Status: CANCELLED | OUTPATIENT
Start: 2021-03-14

## 2021-03-14 RX ORDER — OXYCODONE AND ACETAMINOPHEN 7.5; 325 MG/1; MG/1
1 TABLET ORAL ONCE AS NEEDED
Status: DISCONTINUED | OUTPATIENT
Start: 2021-03-14 | End: 2021-03-14 | Stop reason: HOSPADM

## 2021-03-14 RX ORDER — FENTANYL CITRATE 50 UG/ML
50 INJECTION, SOLUTION INTRAMUSCULAR; INTRAVENOUS
Status: DISCONTINUED | OUTPATIENT
Start: 2021-03-14 | End: 2021-03-14 | Stop reason: HOSPADM

## 2021-03-14 RX ORDER — FENTANYL CITRATE 50 UG/ML
50 INJECTION, SOLUTION INTRAMUSCULAR; INTRAVENOUS
Status: CANCELLED | OUTPATIENT
Start: 2021-03-14

## 2021-03-14 RX ORDER — IBUPROFEN 800 MG/1
800 TABLET ORAL 3 TIMES DAILY
Status: DISCONTINUED | OUTPATIENT
Start: 2021-03-15 | End: 2021-03-22 | Stop reason: HOSPADM

## 2021-03-14 RX ORDER — SODIUM CHLORIDE 0.9 % (FLUSH) 0.9 %
10 SYRINGE (ML) INJECTION AS NEEDED
Status: DISCONTINUED | OUTPATIENT
Start: 2021-03-14 | End: 2021-03-22 | Stop reason: HOSPADM

## 2021-03-14 RX ORDER — HYDROMORPHONE HYDROCHLORIDE 1 MG/ML
0.5 INJECTION, SOLUTION INTRAMUSCULAR; INTRAVENOUS; SUBCUTANEOUS
Status: DISPENSED | OUTPATIENT
Start: 2021-03-14 | End: 2021-03-21

## 2021-03-14 RX ORDER — DIPHENHYDRAMINE HYDROCHLORIDE 50 MG/ML
12.5 INJECTION INTRAMUSCULAR; INTRAVENOUS
Status: DISCONTINUED | OUTPATIENT
Start: 2021-03-14 | End: 2021-03-14 | Stop reason: HOSPADM

## 2021-03-14 RX ORDER — LIDOCAINE HYDROCHLORIDE 20 MG/ML
INJECTION, SOLUTION INFILTRATION; PERINEURAL AS NEEDED
Status: DISCONTINUED | OUTPATIENT
Start: 2021-03-14 | End: 2021-03-14 | Stop reason: SURG

## 2021-03-14 RX ORDER — ACETAMINOPHEN 500 MG
1000 TABLET ORAL EVERY 8 HOURS
Status: DISCONTINUED | OUTPATIENT
Start: 2021-03-15 | End: 2021-03-18

## 2021-03-14 RX ORDER — FLUMAZENIL 0.1 MG/ML
0.2 INJECTION INTRAVENOUS AS NEEDED
Status: DISCONTINUED | OUTPATIENT
Start: 2021-03-14 | End: 2021-03-14 | Stop reason: HOSPADM

## 2021-03-14 RX ORDER — ONDANSETRON 2 MG/ML
4 INJECTION INTRAMUSCULAR; INTRAVENOUS ONCE AS NEEDED
Status: DISCONTINUED | OUTPATIENT
Start: 2021-03-14 | End: 2021-03-14 | Stop reason: HOSPADM

## 2021-03-14 RX ORDER — SUCCINYLCHOLINE CHLORIDE 20 MG/ML
INJECTION INTRAMUSCULAR; INTRAVENOUS AS NEEDED
Status: DISCONTINUED | OUTPATIENT
Start: 2021-03-14 | End: 2021-03-14 | Stop reason: SURG

## 2021-03-14 RX ORDER — ONDANSETRON 2 MG/ML
4 INJECTION INTRAMUSCULAR; INTRAVENOUS EVERY 6 HOURS PRN
Status: DISCONTINUED | OUTPATIENT
Start: 2021-03-14 | End: 2021-03-22 | Stop reason: HOSPADM

## 2021-03-14 RX ORDER — HYDRALAZINE HYDROCHLORIDE 20 MG/ML
5 INJECTION INTRAMUSCULAR; INTRAVENOUS
Status: DISCONTINUED | OUTPATIENT
Start: 2021-03-14 | End: 2021-03-14 | Stop reason: HOSPADM

## 2021-03-14 RX ORDER — SODIUM CHLORIDE 9 MG/ML
50 INJECTION, SOLUTION INTRAVENOUS CONTINUOUS
Status: DISCONTINUED | OUTPATIENT
Start: 2021-03-15 | End: 2021-03-16

## 2021-03-14 RX ORDER — DIPHENHYDRAMINE HCL 25 MG
25 CAPSULE ORAL
Status: DISCONTINUED | OUTPATIENT
Start: 2021-03-14 | End: 2021-03-14 | Stop reason: HOSPADM

## 2021-03-14 RX ORDER — FAMOTIDINE 10 MG/ML
20 INJECTION, SOLUTION INTRAVENOUS ONCE
Status: CANCELLED | OUTPATIENT
Start: 2021-03-14 | End: 2021-03-14

## 2021-03-14 RX ORDER — ONDANSETRON 2 MG/ML
INJECTION INTRAMUSCULAR; INTRAVENOUS AS NEEDED
Status: DISCONTINUED | OUTPATIENT
Start: 2021-03-14 | End: 2021-03-14 | Stop reason: SURG

## 2021-03-14 RX ORDER — MIDAZOLAM HYDROCHLORIDE 1 MG/ML
INJECTION INTRAMUSCULAR; INTRAVENOUS AS NEEDED
Status: DISCONTINUED | OUTPATIENT
Start: 2021-03-14 | End: 2021-03-14 | Stop reason: SURG

## 2021-03-14 RX ORDER — SODIUM CHLORIDE 0.9 % (FLUSH) 0.9 %
10 SYRINGE (ML) INJECTION EVERY 12 HOURS SCHEDULED
Status: DISCONTINUED | OUTPATIENT
Start: 2021-03-15 | End: 2021-03-22 | Stop reason: HOSPADM

## 2021-03-14 RX ORDER — ROCURONIUM BROMIDE 10 MG/ML
INJECTION, SOLUTION INTRAVENOUS AS NEEDED
Status: DISCONTINUED | OUTPATIENT
Start: 2021-03-14 | End: 2021-03-14 | Stop reason: SURG

## 2021-03-14 RX ORDER — MIDAZOLAM HYDROCHLORIDE 1 MG/ML
2 INJECTION INTRAMUSCULAR; INTRAVENOUS
Status: CANCELLED | OUTPATIENT
Start: 2021-03-14

## 2021-03-14 RX ORDER — FAMOTIDINE 10 MG/ML
INJECTION, SOLUTION INTRAVENOUS AS NEEDED
Status: DISCONTINUED | OUTPATIENT
Start: 2021-03-14 | End: 2021-03-14 | Stop reason: SURG

## 2021-03-14 RX ORDER — ACETAMINOPHEN 500 MG
1000 TABLET ORAL ONCE
Status: COMPLETED | OUTPATIENT
Start: 2021-03-14 | End: 2021-03-14

## 2021-03-14 RX ORDER — HYDROCODONE BITARTRATE AND ACETAMINOPHEN 7.5; 325 MG/1; MG/1
1 TABLET ORAL ONCE AS NEEDED
Status: DISCONTINUED | OUTPATIENT
Start: 2021-03-14 | End: 2021-03-14 | Stop reason: HOSPADM

## 2021-03-14 RX ORDER — GLYCOPYRROLATE 0.2 MG/ML
INJECTION INTRAMUSCULAR; INTRAVENOUS AS NEEDED
Status: DISCONTINUED | OUTPATIENT
Start: 2021-03-14 | End: 2021-03-14 | Stop reason: SURG

## 2021-03-14 RX ORDER — EPHEDRINE SULFATE 50 MG/ML
5 INJECTION, SOLUTION INTRAVENOUS ONCE AS NEEDED
Status: DISCONTINUED | OUTPATIENT
Start: 2021-03-14 | End: 2021-03-14 | Stop reason: HOSPADM

## 2021-03-14 RX ORDER — SODIUM CHLORIDE 0.9 % (FLUSH) 0.9 %
3 SYRINGE (ML) INJECTION EVERY 12 HOURS SCHEDULED
Status: CANCELLED | OUTPATIENT
Start: 2021-03-14

## 2021-03-14 RX ORDER — DEXAMETHASONE SODIUM PHOSPHATE 10 MG/ML
INJECTION INTRAMUSCULAR; INTRAVENOUS AS NEEDED
Status: DISCONTINUED | OUTPATIENT
Start: 2021-03-14 | End: 2021-03-14 | Stop reason: SURG

## 2021-03-14 RX ORDER — FENTANYL CITRATE 50 UG/ML
INJECTION, SOLUTION INTRAMUSCULAR; INTRAVENOUS AS NEEDED
Status: DISCONTINUED | OUTPATIENT
Start: 2021-03-14 | End: 2021-03-14 | Stop reason: SURG

## 2021-03-14 RX ORDER — BUPIVACAINE HYDROCHLORIDE AND EPINEPHRINE 5; 5 MG/ML; UG/ML
INJECTION, SOLUTION PERINEURAL AS NEEDED
Status: DISCONTINUED | OUTPATIENT
Start: 2021-03-14 | End: 2021-03-14 | Stop reason: HOSPADM

## 2021-03-14 RX ORDER — KETOROLAC TROMETHAMINE 30 MG/ML
INJECTION, SOLUTION INTRAMUSCULAR; INTRAVENOUS AS NEEDED
Status: DISCONTINUED | OUTPATIENT
Start: 2021-03-14 | End: 2021-03-14 | Stop reason: SURG

## 2021-03-14 RX ORDER — PROMETHAZINE HYDROCHLORIDE 25 MG/1
25 SUPPOSITORY RECTAL ONCE AS NEEDED
Status: DISCONTINUED | OUTPATIENT
Start: 2021-03-14 | End: 2021-03-14 | Stop reason: HOSPADM

## 2021-03-14 RX ORDER — SODIUM CHLORIDE, SODIUM LACTATE, POTASSIUM CHLORIDE, CALCIUM CHLORIDE 600; 310; 30; 20 MG/100ML; MG/100ML; MG/100ML; MG/100ML
INJECTION, SOLUTION INTRAVENOUS CONTINUOUS PRN
Status: DISCONTINUED | OUTPATIENT
Start: 2021-03-14 | End: 2021-03-14 | Stop reason: SURG

## 2021-03-14 RX ORDER — PROPOFOL 10 MG/ML
VIAL (ML) INTRAVENOUS AS NEEDED
Status: DISCONTINUED | OUTPATIENT
Start: 2021-03-14 | End: 2021-03-14 | Stop reason: SURG

## 2021-03-14 RX ORDER — SCOLOPAMINE TRANSDERMAL SYSTEM 1 MG/1
PATCH, EXTENDED RELEASE TRANSDERMAL AS NEEDED
Status: DISCONTINUED | OUTPATIENT
Start: 2021-03-14 | End: 2021-03-14 | Stop reason: SURG

## 2021-03-14 RX ORDER — LORAZEPAM 2 MG/ML
1 INJECTION INTRAMUSCULAR ONCE
Status: COMPLETED | OUTPATIENT
Start: 2021-03-14 | End: 2021-03-14

## 2021-03-14 RX ORDER — LIDOCAINE HYDROCHLORIDE 10 MG/ML
0.5 INJECTION, SOLUTION EPIDURAL; INFILTRATION; INTRACAUDAL; PERINEURAL ONCE AS NEEDED
Status: CANCELLED | OUTPATIENT
Start: 2021-03-14

## 2021-03-14 RX ORDER — SCOLOPAMINE TRANSDERMAL SYSTEM 1 MG/1
PATCH, EXTENDED RELEASE TRANSDERMAL
Status: COMPLETED
Start: 2021-03-14 | End: 2021-03-14

## 2021-03-14 RX ORDER — HYDROMORPHONE HYDROCHLORIDE 1 MG/ML
0.5 INJECTION, SOLUTION INTRAMUSCULAR; INTRAVENOUS; SUBCUTANEOUS
Status: DISCONTINUED | OUTPATIENT
Start: 2021-03-14 | End: 2021-03-14 | Stop reason: HOSPADM

## 2021-03-14 RX ORDER — PROMETHAZINE HYDROCHLORIDE 25 MG/1
25 TABLET ORAL ONCE AS NEEDED
Status: DISCONTINUED | OUTPATIENT
Start: 2021-03-14 | End: 2021-03-14 | Stop reason: HOSPADM

## 2021-03-14 RX ORDER — SODIUM CHLORIDE 0.9 % (FLUSH) 0.9 %
3-10 SYRINGE (ML) INJECTION AS NEEDED
Status: CANCELLED | OUTPATIENT
Start: 2021-03-14

## 2021-03-14 RX ORDER — FAMOTIDINE 10 MG/ML
INJECTION, SOLUTION INTRAVENOUS
Status: COMPLETED
Start: 2021-03-14 | End: 2021-03-14

## 2021-03-14 RX ORDER — LABETALOL HYDROCHLORIDE 5 MG/ML
5 INJECTION, SOLUTION INTRAVENOUS
Status: DISCONTINUED | OUTPATIENT
Start: 2021-03-14 | End: 2021-03-14 | Stop reason: HOSPADM

## 2021-03-14 RX ADMIN — PHENYLEPHRINE HYDROCHLORIDE 200 MCG: 10 INJECTION INTRAVENOUS at 21:29

## 2021-03-14 RX ADMIN — TAZOBACTAM SODIUM AND PIPERACILLIN SODIUM 3.38 G: 375; 3 INJECTION, SOLUTION INTRAVENOUS at 18:52

## 2021-03-14 RX ADMIN — SODIUM CHLORIDE, POTASSIUM CHLORIDE, SODIUM LACTATE AND CALCIUM CHLORIDE: 600; 310; 30; 20 INJECTION, SOLUTION INTRAVENOUS at 21:48

## 2021-03-14 RX ADMIN — HYDROMORPHONE HYDROCHLORIDE 0.5 MG: 2 INJECTION, SOLUTION INTRAMUSCULAR; INTRAVENOUS; SUBCUTANEOUS at 21:49

## 2021-03-14 RX ADMIN — FENTANYL CITRATE 50 MCG: 50 INJECTION INTRAMUSCULAR; INTRAVENOUS at 21:57

## 2021-03-14 RX ADMIN — GLYCOPYRROLATE 0.2 MG: 0.2 INJECTION INTRAMUSCULAR; INTRAVENOUS at 20:34

## 2021-03-14 RX ADMIN — ROCURONIUM BROMIDE 30 MG: 50 INJECTION INTRAVENOUS at 20:48

## 2021-03-14 RX ADMIN — ROCURONIUM BROMIDE 20 MG: 50 INJECTION INTRAVENOUS at 21:11

## 2021-03-14 RX ADMIN — SUCCINYLCHOLINE CHLORIDE 200 MG: 20 INJECTION, SOLUTION INTRAMUSCULAR; INTRAVENOUS; PARENTERAL at 20:32

## 2021-03-14 RX ADMIN — IOPAMIDOL 85 ML: 612 INJECTION, SOLUTION INTRAVENOUS at 17:47

## 2021-03-14 RX ADMIN — SODIUM CHLORIDE 1000 ML: 9 INJECTION, SOLUTION INTRAVENOUS at 16:54

## 2021-03-14 RX ADMIN — PROPOFOL 200 MG: 10 INJECTION, EMULSION INTRAVENOUS at 20:32

## 2021-03-14 RX ADMIN — KETOROLAC TROMETHAMINE 30 MG: 30 INJECTION, SOLUTION INTRAMUSCULAR at 21:19

## 2021-03-14 RX ADMIN — SCOPALAMINE 1 PATCH: 1 PATCH, EXTENDED RELEASE TRANSDERMAL at 20:26

## 2021-03-14 RX ADMIN — MIDAZOLAM 2 MG: 1 INJECTION INTRAMUSCULAR; INTRAVENOUS at 20:25

## 2021-03-14 RX ADMIN — ACETAMINOPHEN 1000 MG: 500 TABLET, FILM COATED ORAL at 18:49

## 2021-03-14 RX ADMIN — LIDOCAINE HYDROCHLORIDE 100 MG: 20 INJECTION, SOLUTION INFILTRATION; PERINEURAL at 20:32

## 2021-03-14 RX ADMIN — DEXAMETHASONE SODIUM PHOSPHATE 8 MG: 10 INJECTION INTRAMUSCULAR; INTRAVENOUS at 20:54

## 2021-03-14 RX ADMIN — SODIUM CHLORIDE, POTASSIUM CHLORIDE, SODIUM LACTATE AND CALCIUM CHLORIDE: 600; 310; 30; 20 INJECTION, SOLUTION INTRAVENOUS at 21:13

## 2021-03-14 RX ADMIN — HYDROMORPHONE HYDROCHLORIDE 1 MG: 2 INJECTION, SOLUTION INTRAMUSCULAR; INTRAVENOUS; SUBCUTANEOUS at 21:47

## 2021-03-14 RX ADMIN — SODIUM CHLORIDE, POTASSIUM CHLORIDE, SODIUM LACTATE AND CALCIUM CHLORIDE: 600; 310; 30; 20 INJECTION, SOLUTION INTRAVENOUS at 20:25

## 2021-03-14 RX ADMIN — FENTANYL CITRATE 100 MCG: 50 INJECTION INTRAMUSCULAR; INTRAVENOUS at 20:25

## 2021-03-14 RX ADMIN — SODIUM CHLORIDE 1000 ML: 9 INJECTION, SOLUTION INTRAVENOUS at 18:31

## 2021-03-14 RX ADMIN — LORAZEPAM 1 MG: 2 INJECTION INTRAMUSCULAR; INTRAVENOUS at 18:49

## 2021-03-14 RX ADMIN — FENTANYL CITRATE 50 MCG: 50 INJECTION INTRAMUSCULAR; INTRAVENOUS at 21:51

## 2021-03-14 RX ADMIN — SUGAMMADEX 300 MG: 100 INJECTION, SOLUTION INTRAVENOUS at 21:44

## 2021-03-14 RX ADMIN — HYDROMORPHONE HYDROCHLORIDE 0.5 MG: 2 INJECTION, SOLUTION INTRAMUSCULAR; INTRAVENOUS; SUBCUTANEOUS at 21:45

## 2021-03-14 RX ADMIN — ONDANSETRON 4 MG: 2 INJECTION INTRAMUSCULAR; INTRAVENOUS at 21:19

## 2021-03-14 RX ADMIN — FAMOTIDINE 20 MG: 10 INJECTION, SOLUTION INTRAVENOUS at 20:28

## 2021-03-14 NOTE — ED TRIAGE NOTES
Pt reports generalized abd. Pain x 3 days pt states diarrhea started today. denies nausea or vomiting.    Patient masked in first look triage. I was wearing mask and goggles.

## 2021-03-14 NOTE — PROGRESS NOTES
Clinical Pharmacy Services: Medication History    Libby Huerta is a 35 y.o. female presenting to [facility] for   Chief Complaint   Patient presents with   • Abdominal Pain   • Diarrhea       She  has a past medical history of Anxiety and Asthma.    Allergies as of 03/14/2021 - Reviewed 03/14/2021   Allergen Reaction Noted   • Ceclor [cefaclor] Shortness Of Breath 03/14/2021       Medication information was obtained from: Patient and Mom  Pharmacy and Phone Number:   [facility]/pharmacy #[id number] - [place], [place] - [address] - [phone]  - [phone]   [address].  [facility] [address]  Phone: [phone] Fax: [phone]        Prior to Admission Medications     None            Medication notes: Patient states she takes no medications at home.    This medication list is complete to the best of my knowledge as of 3/14/2021    Please call if questions.    Daine Guillermo PharmD Candidate 2021  Medication History Technician  [phone]    3/14/2021 18:48 EDT

## 2021-03-14 NOTE — ED PROVIDER NOTES
EMERGENCY DEPARTMENT ENCOUNTER    Room Number:  02/02  Date of encounter:  3/14/2021  PCP: Kristan Langford APRN  Historian: Patient      I used full protective equipment while examining this patient.  This includes face mask, gloves and protective eyewear.  I washed my hands before entering the room and immediately upon leaving the room      HPI:  Chief Complaint: Abdominal pain, fatigue  A complete HPI/ROS/PMH/PSH/SH/FH are unobtainable due to: Nothing    Context: Libby Huerta is a 35 y.o. female who presents to the ED c/o 4 to 5-day history of fatigue, malaise, progressive abdominal pain and distention, as well as 1 episode of watery diarrhea today.  Patient denies any known fever, chest pain, shortness of breath, vomiting, dysuria, vaginal discharge.  Patient denies any previous similar symptoms.  She denies any ill contacts.  She describes her abdominal pain is generalized and moderate in intensity.  She denies any precipitating alleviating factors.    Review of Medical Records  No previous pertinent medical records.    PAST MEDICAL HISTORY  Active Ambulatory Problems     Diagnosis Date Noted   • No Active Ambulatory Problems     Resolved Ambulatory Problems     Diagnosis Date Noted   • No Resolved Ambulatory Problems     Past Medical History:   Diagnosis Date   • Anxiety    • Asthma          PAST SURGICAL HISTORY  Past Surgical History:   Procedure Laterality Date   • LASIK     • TONSILLECTOMY           FAMILY HISTORY  Family History   Problem Relation Age of Onset   • Celiac disease Mother    • Cancer Mother    • Cervical cancer Mother    • Thyroid disease Maternal Grandmother    • Rheum arthritis Maternal Grandmother    • Crohn's disease Maternal Grandfather          SOCIAL HISTORY  Social History     Socioeconomic History   • Marital status: Single     Spouse name: Not on file   • Number of children: Not on file   • Years of education: Not on file   • Highest education level: Not on file   Tobacco Use    • Smoking status: Former Smoker   • Smokeless tobacco: Never Used   Substance and Sexual Activity   • Alcohol use: Yes     Alcohol/week: 1.0 standard drinks     Types: 1 Glasses of wine per week     Comment: rare   • Drug use: No   • Sexual activity: Yes     Partners: Male         ALLERGIES  Ceclor [cefaclor]        REVIEW OF SYSTEMS  All systems reviewed and negative except for those discussed in HPI.       PHYSICAL EXAM    I have reviewed the triage vital signs and nursing notes.    ED Triage Vitals [03/14/21 1548]   Temp Heart Rate Resp BP SpO2   (!) 100.9 °F (38.3 °C) (!) 151 18 -- 97 %      Temp src Heart Rate Source Patient Position BP Location FiO2 (%)   Tympanic -- -- -- --       Physical Exam  GENERAL: Alert, oriented, mildly ill-appearing, not distressed  HENT: head atraumatic, no nuchal rigidity  EYES: no scleral icterus, EOMI  CV: regular rhythm, regular rate, no murmur  RESPIRATORY: normal effort, CTA  ABDOMEN: soft, moderate diffuse abdominal tenderness without guarding rebound.  No masses.  MUSCULOSKELETAL: no deformity, FROM, no calf swelling or tenderness  NEURO: alert, moves all extremities, follows commands  SKIN: warm, dry        LAB RESULTS  Recent Results (from the past 24 hour(s))   Comprehensive Metabolic Panel    Collection Time: 03/14/21  4:54 PM    Specimen: Blood   Result Value Ref Range    Glucose 112 (H) 65 - 99 mg/dL    BUN 13 6 - 20 mg/dL    Creatinine 1.11 (H) 0.57 - 1.00 mg/dL    Sodium 134 (L) 136 - 145 mmol/L    Potassium 4.1 3.5 - 5.2 mmol/L    Chloride 99 98 - 107 mmol/L    CO2 23.8 22.0 - 29.0 mmol/L    Calcium 8.9 8.6 - 10.5 mg/dL    Total Protein 7.7 6.0 - 8.5 g/dL    Albumin 3.30 (L) 3.50 - 5.20 g/dL    ALT (SGPT) 26 1 - 33 U/L    AST (SGOT) 10 1 - 32 U/L    Alkaline Phosphatase 88 39 - 117 U/L    Total Bilirubin 0.9 0.0 - 1.2 mg/dL    eGFR Non African Amer 57 (L) >60 mL/min/1.73    Globulin 4.4 gm/dL    A/G Ratio 0.8 g/dL    BUN/Creatinine Ratio 11.7 7.0 - 25.0    Anion  Gap 11.2 5.0 - 15.0 mmol/L   Lipase    Collection Time: 03/14/21  4:54 PM    Specimen: Blood   Result Value Ref Range    Lipase 19 13 - 60 U/L   hCG, Serum, Qualitative    Collection Time: 03/14/21  4:54 PM    Specimen: Blood   Result Value Ref Range    HCG Qualitative Negative Negative   CBC Auto Differential    Collection Time: 03/14/21  4:54 PM    Specimen: Blood   Result Value Ref Range    WBC 19.92 (H) 3.40 - 10.80 10*3/mm3    RBC 3.99 3.77 - 5.28 10*6/mm3    Hemoglobin 11.6 (L) 12.0 - 15.9 g/dL    Hematocrit 35.1 34.0 - 46.6 %    MCV 88.0 79.0 - 97.0 fL    MCH 29.1 26.6 - 33.0 pg    MCHC 33.0 31.5 - 35.7 g/dL    RDW 12.0 (L) 12.3 - 15.4 %    RDW-SD 38.3 37.0 - 54.0 fl    MPV 9.6 6.0 - 12.0 fL    Platelets 397 140 - 450 10*3/mm3    Neutrophil % 89.7 (H) 42.7 - 76.0 %    Lymphocyte % 6.2 (L) 19.6 - 45.3 %    Monocyte % 3.1 (L) 5.0 - 12.0 %    Eosinophil % 0.0 (L) 0.3 - 6.2 %    Basophil % 0.4 0.0 - 1.5 %    Immature Grans % 0.6 (H) 0.0 - 0.5 %    Neutrophils, Absolute 17.88 (H) 1.70 - 7.00 10*3/mm3    Lymphocytes, Absolute 1.24 0.70 - 3.10 10*3/mm3    Monocytes, Absolute 0.62 0.10 - 0.90 10*3/mm3    Eosinophils, Absolute 0.00 0.00 - 0.40 10*3/mm3    Basophils, Absolute 0.07 0.00 - 0.20 10*3/mm3    Immature Grans, Absolute 0.11 (H) 0.00 - 0.05 10*3/mm3    nRBC 0.0 0.0 - 0.2 /100 WBC   Procalcitonin    Collection Time: 03/14/21  4:54 PM    Specimen: Blood   Result Value Ref Range    Procalcitonin 8.96 (H) 0.00 - 0.25 ng/mL   Urinalysis With Microscopic If Indicated (No Culture) - Urine, Clean Catch    Collection Time: 03/14/21  6:32 PM    Specimen: Urine, Clean Catch   Result Value Ref Range    Color, UA Yellow Yellow, Straw    Appearance, UA Clear Clear    pH, UA 6.5 5.0 - 8.0    Specific Gravity, UA 1.029 1.005 - 1.030    Glucose, UA Negative Negative    Ketones, UA Negative Negative    Bilirubin, UA Negative Negative    Blood, UA Trace (A) Negative    Protein, UA Trace (A) Negative    Leuk Esterase, UA Negative  Negative    Nitrite, UA Negative Negative    Urobilinogen, UA 0.2 E.U./dL 0.2 - 1.0 E.U./dL   Lactic Acid, Plasma    Collection Time: 03/14/21  6:32 PM    Specimen: Blood   Result Value Ref Range    Lactate 2.3 (C) 0.5 - 2.0 mmol/L   Urinalysis, Microscopic Only - Urine, Clean Catch    Collection Time: 03/14/21  6:32 PM    Specimen: Urine, Clean Catch   Result Value Ref Range    RBC, UA 0-2 None Seen, 0-2 /HPF    WBC, UA 0-2 None Seen, 0-2 /HPF    Bacteria, UA None Seen None Seen /HPF    Squamous Epithelial Cells, UA 0-2 None Seen, 0-2 /HPF    Hyaline Casts, UA None Seen None Seen /LPF    Methodology Manual Light Microscopy        Ordered the above labs and independently reviewed the results.        RADIOLOGY  CT Abdomen Pelvis With Contrast    Result Date: 3/14/2021  CR Patient: JANELLE ROSADO  Time Out: 18:30 Exam(s): CT ABDOMEN + PELVIS With Contrast EXAM:   CT Abdomen and Pelvis With Intravenous Contrast CLINICAL HISTORY:    Reason for exam: abd pain, diarrhea. TECHNIQUE:   Axial computed tomography images of the abdomen and pelvis with intravenous contrast.  CTDI is 28.80 mGy and DLP is 1739.20 mGy-cm.  This CT exam was performed according to the principle of ALARA (As Low As Reasonably Achievable) by using one or more of the following dose reduction techniques: automated exposure control, adjustment of the mA and or kV according to patient size, and or use of iterative reconstruction technique. COMPARISON:   No relevant prior studies available. FINDINGS: The included lower lungs demonstrate numerous air-filled cystic lesions, largest in the inferior right middle lobe measuring 5.3 cm in maximal axial dimension.  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   No other clear sequela such as renal angiomyolipoma or chylous thoracic or abdominal collections.  Consider correlation with serum vascular endothelial growth factor D (VEGF-D).  Langerhans' cell histiocytosis and lymphocytic interstitial  pneumonitis are also possible.  There is a 5 mm juxtapleural nodule in the lateral right middle lobe.  Consider 12 month follow-up. The liver, biliary tree, pancreas, spleen, kidneys, and adrenal glands are within normal limits. The appendix is mildly dilated, up to 9 mm in diameter distally.  There is extensive edema about the distal appendix.  Findings are consistent with appendicitis.  Free pelvic fluid with peritoneal thickening may reflect early abscess formation.  Maximal dimensions are 5 x 3.2 cm (transverse by anteroposterior).  There is no free air.  Liquid colonic contents would support provided history of diarrhea. No abdominal aortic aneurysm.  Tiny fatty umbilical hernia.  No acute fracture. IMPRESSION:     Appendicitis.   5 cm deep pelvic fluid collection could reflect early abscess.  No free air. Liquid colonic contents, consistent with provided history of diarrhea. 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  See discussion above. 5 mm RML pulmonary nodule.  Consider 12 month follow-up     Communications: 03 14 21 18:25 Call Doctor Regarding Appendicitis, called  Sean Sanchez PA-C on 03 14 18:25 (-04:00) Electronically signed by Odin Fernandez M.D. on 03-14-21 at 1830      I ordered the above noted radiological studies. Reviewed by me and discussed with radiologist.  See dictation for official radiology interpretation.      MEDICATIONS GIVEN IN ER    Medications   sodium chloride 0.9 % flush 10 mL (has no administration in time range)   sodium chloride 0.9 % bolus 1,000 mL (0 mL Intravenous Stopped 3/14/21 1830)   iopamidol (ISOVUE-300) 61 % injection 100 mL (85 mL Intravenous Given by Other 3/14/21 1747)   piperacillin-tazobactam (ZOSYN) 3.375 g in iso-osmotic dextrose 50 ml (premix) (0 g Intravenous Stopped 3/14/21 1930)   sodium chloride 0.9 % bolus 1,000 mL (1,000 mL Intravenous New Bag 3/14/21 1831)   acetaminophen (TYLENOL) tablet 1,000 mg (1,000 mg Oral Given  3/14/21 1849)   LORazepam (ATIVAN) injection 1 mg (1 mg Intravenous Given 3/14/21 1849)         PROGRESS, DATA ANALYSIS, CONSULTS, AND MEDICAL DECISION MAKING    All labs have been independently reviewed by me.  All radiology studies have been reviewed by me and discussed with radiologist dictating the report.   EKG's independently viewed and interpreted by me.  Discussion below represents my analysis of pertinent findings related to patient's condition, differential diagnosis, treatment plan and final disposition.    I have discussed case with Dr. Yarbrough, emergency room physician.  He has performed his own bedside examination and agrees with treatment plan.    ED Course as of Mar 14 1949   Sun Mar 14, 2021   1625 Patient presents with several day history of malaise, fatigue, diffuse abdominal pain, one episode of diarrhea.  Differential diagnoses include not limited to bowel obstruction, colitis, appendicitis, diverticulitis, IBS, infectious diarrhea.    [EE]   1716 WBC(!): 19.92 [EE]   1716 Hemoglobin(!): 11.6 [EE]   1827 I discussed CT findings with Dr. Harkins, radiologist.  Patient appears to have appendicitis with a 5 x 3 cm early abscess/fluid collection in the deep pelvis.  In addition there is an atypical appearance to the lungs raising concern for underlying lung issues.  He recommends pulmonary follow-up.    [EE]   1848 I discussed case with Dr. Rodriguez, general surgery.  She agrees to accept the patient.  She will admit the patient and take her to surgery tonight.    [EE]      ED Course User Index  [EE] Sean Sanchez PA       AS OF 19:49 EDT VITALS:    BP - 113/64  HR - (!) 138  TEMP - (!) 103.1 °F (39.5 °C) (Tympanic)  O2 SATS - 92%        DIAGNOSIS  Final diagnoses:   Acute appendicitis with localized peritonitis and abscess, without gangrene, unspecified whether perforation present   Abnormal finding of lung         DISPOSITION  Admitted           Sean Sanchez PA  03/14/21 1949

## 2021-03-14 NOTE — ED PROVIDER NOTES
Pt presents to the ED c/o  1 week of worsening abdominal pain, diarrhea.  Denies any measured fevers, vomiting, dysuria.  Denies any vaginal discharge.     On exam,   General: Awake, alert, appears uncomfortable and mildly ill-appearing, nondiaphoretic  HEENT: Mucous membranes moist, atraumatic, EOMI  Neck: Full ROM  Pulm: Symmetric chest rise, nonlabored, lungs CTAB  Cardiovascular: Regular rhythm tachycardia, intact distal pulses  GI: Soft, tenderness to palpation in the right lower quadrant, nondistended, no rebound, no guarding, bowel sounds diminished  MSK: Full ROM, no deformity  Skin: Warm, dry  Neuro: Alert and oriented x 3, GCS 15, moving all extremities, no focal deficits  Psych: Calm, cooperative      Surgical mask, protective eye goggles, and gloves used during this encounter. Patient in surgical mask.      Plan:   ED Course as of Mar 16 1503   Sun Mar 14, 2021   1625 Patient presents with several day history of malaise, fatigue, diffuse abdominal pain, one episode of diarrhea.  Differential diagnoses include not limited to bowel obstruction, colitis, appendicitis, diverticulitis, IBS, infectious diarrhea.    [EE]   1716 WBC(!): 19.92 [EE]   1716 Hemoglobin(!): 11.6 [EE]   1827 I discussed CT findings with Dr. Harkins, radiologist.  Patient appears to have appendicitis with a 5 x 3 cm early abscess/fluid collection in the deep pelvis.  In addition there is an atypical appearance to the lungs raising concern for underlying lung issues.  He recommends pulmonary follow-up.    [EE]   1848 I discussed case with Dr. Rodriguez, general surgery.  She agrees to accept the patient.  She will admit the patient and take her to surgery tonight.    [EE]      ED Course User Index  [EE] Sean Sanchez PA     Acute appendicitis on CT scan, discussed the abnormal lung findings with the patient as well, with need for further testing and evaluation by Pulmonary in the near future.     Attestation:  The PETE and I have discussed  this patient's history, physical exam, and treatment plan.  I have reviewed the documentation and personally had a face to face interaction with the patient. I affirm the documentation and agree with the treatment and plan.  The attached note describes my personal findings.          Yuval Yarbrough MD  03/16/21 [id number]

## 2021-03-15 PROBLEM — R09.02 POSTOPERATIVE HYPOXIA: Status: ACTIVE | Noted: 2021-03-15

## 2021-03-15 PROBLEM — D72.819 LEUKOCYTOPENIA: Status: ACTIVE | Noted: 2021-03-15

## 2021-03-15 PROBLEM — E66.9 OBESITY (BMI 30-39.9): Status: ACTIVE | Noted: 2021-03-15

## 2021-03-15 PROBLEM — R73.09 ELEVATED RANDOM BLOOD GLUCOSE LEVEL: Status: ACTIVE | Noted: 2021-03-15

## 2021-03-15 PROBLEM — Z98.890 POSTOPERATIVE HYPOXIA: Status: ACTIVE | Noted: 2021-03-15

## 2021-03-15 PROBLEM — D72.829 LEUKOCYTOSIS: Status: ACTIVE | Noted: 2021-03-15

## 2021-03-15 PROBLEM — R73.9 ELEVATED RANDOM BLOOD GLUCOSE LEVEL: Status: ACTIVE | Noted: 2021-03-15

## 2021-03-15 LAB
ANION GAP SERPL CALCULATED.3IONS-SCNC: 8.5 MMOL/L (ref 5–15)
BUN SERPL-MCNC: 11 MG/DL (ref 6–20)
BUN/CREAT SERPL: 14.9 (ref 7–25)
CALCIUM SPEC-SCNC: 7.9 MG/DL (ref 8.6–10.5)
CHLORIDE SERPL-SCNC: 108 MMOL/L (ref 98–107)
CO2 SERPL-SCNC: 19.5 MMOL/L (ref 22–29)
CREAT SERPL-MCNC: 0.74 MG/DL (ref 0.57–1)
D-LACTATE SERPL-SCNC: 0.8 MMOL/L (ref 0.5–2)
DEPRECATED RDW RBC AUTO: 39.1 FL (ref 37–54)
ERYTHROCYTE [DISTWIDTH] IN BLOOD BY AUTOMATED COUNT: 11.9 % (ref 12.3–15.4)
GFR SERPL CREATININE-BSD FRML MDRD: 90 ML/MIN/1.73
GLUCOSE SERPL-MCNC: 135 MG/DL (ref 65–99)
HBA1C MFR BLD: 5.2 % (ref 4.8–5.6)
HCT VFR BLD AUTO: 33 % (ref 34–46.6)
HGB BLD-MCNC: 10.8 G/DL (ref 12–15.9)
LYMPHOCYTES # BLD MANUAL: 0.35 10*3/MM3 (ref 0.7–3.1)
LYMPHOCYTES NFR BLD MANUAL: 2 % (ref 19.6–45.3)
LYMPHOCYTES NFR BLD MANUAL: 3 % (ref 5–12)
MCH RBC QN AUTO: 29.8 PG (ref 26.6–33)
MCHC RBC AUTO-ENTMCNC: 32.7 G/DL (ref 31.5–35.7)
MCV RBC AUTO: 91.2 FL (ref 79–97)
MONOCYTES # BLD AUTO: 0.52 10*3/MM3 (ref 0.1–0.9)
NEUTROPHILS # BLD AUTO: 16.48 10*3/MM3 (ref 1.7–7)
NEUTROPHILS NFR BLD MANUAL: 94.9 % (ref 42.7–76)
NRBC BLD AUTO-RTO: 0 /100 WBC (ref 0–0.2)
PLAT MORPH BLD: NORMAL
PLATELET # BLD AUTO: 319 10*3/MM3 (ref 140–450)
PMV BLD AUTO: 9.9 FL (ref 6–12)
POTASSIUM SERPL-SCNC: 4.4 MMOL/L (ref 3.5–5.2)
RBC # BLD AUTO: 3.62 10*6/MM3 (ref 3.77–5.28)
RBC MORPH BLD: NORMAL
SODIUM SERPL-SCNC: 136 MMOL/L (ref 136–145)
WBC # BLD AUTO: 17.37 10*3/MM3 (ref 3.4–10.8)
WBC MORPH BLD: NORMAL

## 2021-03-15 PROCEDURE — 80048 BASIC METABOLIC PNL TOTAL CA: CPT | Performed by: STUDENT IN AN ORGANIZED HEALTH CARE EDUCATION/TRAINING PROGRAM

## 2021-03-15 PROCEDURE — 25010000002 PIPERACILLIN SOD-TAZOBACTAM PER 1 G: Performed by: STUDENT IN AN ORGANIZED HEALTH CARE EDUCATION/TRAINING PROGRAM

## 2021-03-15 PROCEDURE — 85025 COMPLETE CBC W/AUTO DIFF WBC: CPT | Performed by: STUDENT IN AN ORGANIZED HEALTH CARE EDUCATION/TRAINING PROGRAM

## 2021-03-15 PROCEDURE — 83036 HEMOGLOBIN GLYCOSYLATED A1C: CPT | Performed by: NURSE PRACTITIONER

## 2021-03-15 PROCEDURE — 99024 POSTOP FOLLOW-UP VISIT: CPT | Performed by: STUDENT IN AN ORGANIZED HEALTH CARE EDUCATION/TRAINING PROGRAM

## 2021-03-15 PROCEDURE — 25010000002 HYDROMORPHONE PER 4 MG: Performed by: STUDENT IN AN ORGANIZED HEALTH CARE EDUCATION/TRAINING PROGRAM

## 2021-03-15 PROCEDURE — 25010000002 ENOXAPARIN PER 10 MG: Performed by: STUDENT IN AN ORGANIZED HEALTH CARE EDUCATION/TRAINING PROGRAM

## 2021-03-15 PROCEDURE — 25010000002 ONDANSETRON PER 1 MG: Performed by: STUDENT IN AN ORGANIZED HEALTH CARE EDUCATION/TRAINING PROGRAM

## 2021-03-15 PROCEDURE — 85007 BL SMEAR W/DIFF WBC COUNT: CPT | Performed by: STUDENT IN AN ORGANIZED HEALTH CARE EDUCATION/TRAINING PROGRAM

## 2021-03-15 RX ORDER — POLYETHYLENE GLYCOL 3350 17 G/17G
17 POWDER, FOR SOLUTION ORAL DAILY
Status: DISCONTINUED | OUTPATIENT
Start: 2021-03-15 | End: 2021-03-17

## 2021-03-15 RX ORDER — ALBUTEROL SULFATE 2.5 MG/3ML
2.5 SOLUTION RESPIRATORY (INHALATION) EVERY 6 HOURS PRN
Status: DISCONTINUED | OUTPATIENT
Start: 2021-03-15 | End: 2021-03-22 | Stop reason: HOSPADM

## 2021-03-15 RX ADMIN — SODIUM CHLORIDE, PRESERVATIVE FREE 10 ML: 5 INJECTION INTRAVENOUS at 00:19

## 2021-03-15 RX ADMIN — SODIUM CHLORIDE 125 ML/HR: 9 INJECTION, SOLUTION INTRAVENOUS at 09:30

## 2021-03-15 RX ADMIN — ENOXAPARIN SODIUM 40 MG: 40 INJECTION SUBCUTANEOUS at 20:11

## 2021-03-15 RX ADMIN — IBUPROFEN 800 MG: 800 TABLET, FILM COATED ORAL at 09:30

## 2021-03-15 RX ADMIN — ACETAMINOPHEN 1000 MG: 500 TABLET, FILM COATED ORAL at 16:56

## 2021-03-15 RX ADMIN — HYDROMORPHONE HYDROCHLORIDE 0.5 MG: 1 INJECTION, SOLUTION INTRAMUSCULAR; INTRAVENOUS; SUBCUTANEOUS at 13:49

## 2021-03-15 RX ADMIN — ONDANSETRON 4 MG: 2 INJECTION INTRAMUSCULAR; INTRAVENOUS at 14:06

## 2021-03-15 RX ADMIN — SODIUM CHLORIDE 125 ML/HR: 9 INJECTION, SOLUTION INTRAVENOUS at 00:19

## 2021-03-15 RX ADMIN — TAZOBACTAM SODIUM AND PIPERACILLIN SODIUM 3.38 G: 375; 3 INJECTION, SOLUTION INTRAVENOUS at 09:30

## 2021-03-15 RX ADMIN — ACETAMINOPHEN 1000 MG: 500 TABLET, FILM COATED ORAL at 00:19

## 2021-03-15 RX ADMIN — HYDROMORPHONE HYDROCHLORIDE 0.5 MG: 1 INJECTION, SOLUTION INTRAMUSCULAR; INTRAVENOUS; SUBCUTANEOUS at 20:25

## 2021-03-15 RX ADMIN — IBUPROFEN 800 MG: 800 TABLET, FILM COATED ORAL at 20:10

## 2021-03-15 RX ADMIN — IBUPROFEN 800 MG: 800 TABLET, FILM COATED ORAL at 15:38

## 2021-03-15 RX ADMIN — ACETAMINOPHEN 1000 MG: 500 TABLET, FILM COATED ORAL at 09:29

## 2021-03-15 RX ADMIN — TAZOBACTAM SODIUM AND PIPERACILLIN SODIUM 3.38 G: 375; 3 INJECTION, SOLUTION INTRAVENOUS at 16:56

## 2021-03-15 RX ADMIN — TAZOBACTAM SODIUM AND PIPERACILLIN SODIUM 3.38 G: 375; 3 INJECTION, SOLUTION INTRAVENOUS at 00:19

## 2021-03-15 RX ADMIN — POLYETHYLENE GLYCOL 3350 17 G: 17 POWDER, FOR SOLUTION ORAL at 15:38

## 2021-03-15 RX ADMIN — SODIUM CHLORIDE, PRESERVATIVE FREE 10 ML: 5 INJECTION INTRAVENOUS at 09:29

## 2021-03-15 RX ADMIN — SODIUM CHLORIDE, PRESERVATIVE FREE 10 ML: 5 INJECTION INTRAVENOUS at 20:15

## 2021-03-15 NOTE — PLAN OF CARE
Problem: Adult Inpatient Plan of Care  Goal: Plan of Care Review  Outcome: Ongoing, Progressing  Flowsheets  Taken 3/15/2021 1723 by Ese Walden RN  Progress: improving  Outcome Summary: VSS. NS @ 50ml/hr. Pt slowly tritrated from 4L NC to 1L today, tolerating well. Ext Cath removed. Pt up to toilet and voided. Will advance activity as tolerated.  Taken 3/15/2021 0650 by Missy Del Rio RN  Plan of Care Reviewed With: patient  Goal: Patient-Specific Goal (Individualized)  Outcome: Ongoing, Progressing  Goal: Absence of Hospital-Acquired Illness or Injury  Outcome: Ongoing, Progressing  Intervention: Identify and Manage Fall Risk  Recent Flowsheet Documentation  Taken 3/15/2021 1701 by Ese Walden RN  Safety Promotion/Fall Prevention:   activity supervised   clutter free environment maintained   fall prevention program maintained   safety round/check completed   room organization consistent   nonskid shoes/slippers when out of bed  Taken 3/15/2021 1251 by Ese Walden RN  Safety Promotion/Fall Prevention:   activity supervised   assistive device/personal items within reach   clutter free environment maintained   fall prevention program maintained   nonskid shoes/slippers when out of bed   room organization consistent   safety round/check completed  Taken 3/15/2021 0929 by Ese Walden RN  Safety Promotion/Fall Prevention: activity supervised  Intervention: Prevent Skin Injury  Recent Flowsheet Documentation  Taken 3/15/2021 1701 by Ese Walden RN  Body Position:   supine   position changed independently  Taken 3/15/2021 1251 by Ese Walden RN  Body Position:   supine   position changed independently  Taken 3/15/2021 0929 by Ese Walden RN  Body Position:   supine   position changed independently  Intervention: Prevent and Manage VTE (venous thromboembolism) Risk  Recent Flowsheet Documentation  Taken 3/15/2021 0929 by Ese Walden RN  VTE  Prevention/Management: dorsiflexion/plantar flexion performed  Intervention: Prevent Infection  Recent Flowsheet Documentation  Taken 3/15/2021 0929 by Ese Walden RN  Infection Prevention:   single patient room provided   rest/sleep promoted  Goal: Optimal Comfort and Wellbeing  Outcome: Ongoing, Progressing  Goal: Readiness for Transition of Care  Outcome: Ongoing, Progressing     Problem: Adjustment to Illness (Sepsis/Septic Shock)  Goal: Optimal Coping  Outcome: Ongoing, Progressing  Intervention: Optimize Psychosocial Adjustment to Illness  Recent Flowsheet Documentation  Taken 3/15/2021 1701 by Ese Walden RN  Supportive Measures:   active listening utilized   self-care encouraged  Taken 3/15/2021 0929 by Ese Walden RN  Supportive Measures:   active listening utilized   self-care encouraged     Problem: Bleeding (Sepsis/Septic Shock)  Goal: Absence of Bleeding  Outcome: Ongoing, Progressing  Intervention: Minimize Bleeding Risk  Recent Flowsheet Documentation  Taken 3/15/2021 0929 by Ese Walden RN  Bleeding Precautions: monitored for signs of bleeding     Problem: Glycemic Control Impaired (Sepsis/Septic Shock)  Goal: Blood Glucose Level Within Desired Range  Outcome: Ongoing, Progressing  Intervention: Optimize Glycemic Control  Recent Flowsheet Documentation  Taken 3/15/2021 0929 by Ese Walden RN  Glycemic Management: oral hydration promoted     Problem: Hemodynamic Instability (Sepsis/Septic Shock)  Goal: Effective Tissue Perfusion  Outcome: Ongoing, Progressing  Intervention: Optimize Blood Flow  Recent Flowsheet Documentation  Taken 3/15/2021 0929 by Ese Walden RN  Fluid/Electrolyte Management: fluids provided     Problem: Infection (Sepsis/Septic Shock)  Goal: Absence of Infection Signs and Symptoms  Outcome: Ongoing, Progressing  Intervention: Prevent or Manage Infection Progression  Recent Flowsheet Documentation  Taken 3/15/2021 0929 by Iram  JOSE Mulligan  Infection Prevention:   single patient room provided   rest/sleep promoted     Problem: Nutrition Impaired (Sepsis/Septic Shock)  Goal: Optimal Nutrition Intake  Outcome: Ongoing, Progressing     Problem: Respiratory Compromise (Sepsis/Septic Shock)  Goal: Effective Oxygenation and Ventilation  Outcome: Ongoing, Progressing  Intervention: Promote Airway Secretion Clearance  Recent Flowsheet Documentation  Taken 3/15/2021 1701 by Ese Walden RN  Activity Management: activity adjusted per tolerance  Taken 3/15/2021 0929 by Ese Walden RN  Activity Management: activity adjusted per tolerance  Cough And Deep Breathing: done independently per patient  Intervention: Optimize Oxygenation and Ventilation  Recent Flowsheet Documentation  Taken 3/15/2021 1701 by Ese Walden RN  Head of Bed (HOB): HOB at 30-45 degrees  Taken 3/15/2021 1251 by Ese Walden RN  Head of Bed (HOB): HOB at 30 degrees  Taken 3/15/2021 0929 by Ese Walden RN  Head of Bed (HOB): HOB at 20-30 degrees     Problem: Bleeding (Appendectomy)  Goal: Absence of Bleeding  Outcome: Ongoing, Progressing     Problem: Bowel Elimination Impaired (Appendectomy)  Goal: Effective Bowel Elimination  Outcome: Ongoing, Progressing     Problem: Infection (Appendectomy)  Goal: Absence of Infection Signs and Symptoms  Outcome: Ongoing, Progressing     Problem: Ongoing Anesthesia Effects (Appendectomy)  Goal: Anesthesia/Sedation Recovery  Outcome: Ongoing, Progressing  Intervention: Optimize Anesthesia Recovery  Recent Flowsheet Documentation  Taken 3/15/2021 1701 by Ese Walden RN  Safety Promotion/Fall Prevention:   activity supervised   clutter free environment maintained   fall prevention program maintained   safety round/check completed   room organization consistent   nonskid shoes/slippers when out of bed  Reorientation Measures: clock in view  Taken 3/15/2021 1251 by Ese Walden RN  Safety  Promotion/Fall Prevention:   activity supervised   assistive device/personal items within reach   clutter free environment maintained   fall prevention program maintained   nonskid shoes/slippers when out of bed   room organization consistent   safety round/check completed  Taken 3/15/2021 0929 by Ese Walden, RN  Safety Promotion/Fall Prevention: activity supervised  Reorientation Measures: clock in view     Problem: Pain (Appendectomy)  Goal: Acceptable Pain Control  Outcome: Ongoing, Progressing     Problem: Postoperative Nausea and Vomiting (Appendectomy)  Goal: Nausea and Vomiting Relief  Outcome: Ongoing, Progressing     Problem: Postoperative Urinary Retention (Appendectomy)  Goal: Effective Urinary Elimination  Outcome: Ongoing, Progressing   Goal Outcome Evaluation:     Progress: improving  Outcome Summary: VSS. NS @ 50ml/hr. Pt slowly tritrated from 4L NC to 1L today, tolerating well. Ext Cath removed. Pt up to toilet and voided. Will advance activity as tolerated.

## 2021-03-15 NOTE — H&P
"General Surgery history and physical exam    Summary:    Mrs. Libby Huerta is a 35 y.o. year old lady with likely ruptured appendicitis with sepsis.  She is tachycardic to the 140s and has a temperature 103.5.  We will plan on OR tonight for diagnostic laparoscopy, likely appendectomy.we did discuss in detail the possibility of being unable to identify the appendix and performing a washout and leaving a drain versus an exploratory laparotomy and bowel resection.  All risks (including bleeding, infection, damage to surrounding structures, anastomotic leak), benefits, and alternatives were explained to the patient and her boyfriend and she agreed and wished to proceed.    Chief Complaint:    Abdominal pain    History of Present Illness:    Mrs. Libby Huerta is a 35 y.o. year old lady who presents to the ER with a 6-day history of worsening fatigue and abdominal pain.  She states she is unable to go to bed for the past 3 days.  She has had worsening right lower quadrant abdominal pain and began having watery diarrhea.  She denies any prior similar episodes or sick contacts.  She was on her period 2 days ago.    Past Medical History:   • None    Past Surgical History:    • Tonsillectomy    Family History:    • Mother with cervical cancer  • Maternal aunt with Crohn's disease    Social History:    • Denies tobacco use  • Occasional alcohol use    Allergies:   Allergies   Allergen Reactions   • Ceclor [Cefaclor] Shortness Of Breath     Per mom \"trouble breathing\"       Medications:     Current Facility-Administered Medications:   •  [COMPLETED] Insert peripheral IV, , , Once **AND** sodium chloride 0.9 % flush 10 mL, 10 mL, Intravenous, PRN, Sean Sanchez PA  No current outpatient medications on file.    Radiology/Endoscopy:    • CT scan reviewed; likely appendicitis, dense inflammatory reaction and fluid in the right lower quadrant, no free air    Labs:    • White blood cell count 19.9 hemoglobin 11.6 platelets 397 " creatinine 1.1 CO2 23    Review of Systems:   Influenza-like illness: no fever, no  cough, no  sore throat, no  body aches, no loss of sense of taste or smell, no known exposure to person with Covid-19.  Constitutional: Negative for fevers or chills  HENT: Negative for hearing loss or runny nose  Eyes: Negative for vision changes or scleral icterus  Respiratory: Negative for cough or shortness of breath  Cardiovascular: Negative for chest pain or heart palpitations  Gastrointestinal: Positive for abdominal pain; negative for nausea, vomiting, constipation, melena, or hematochezia  Genitourinary: Negative for hematuria or dysuria  Musculoskeletal: Negative for joint swelling or gait instability  Neurologic: Negative for tremors or seizures  Psychiatric: Negative for suicidal ideations or depression  All other systems reviewed and negative    Physical Exam:   • Constitutional: Well-developed, well-nourished, appears unwell, diaphoretic  • Vital signs: Temperature 103.1 heart rate 134 respiratory rate 16 blood pressure 112/49  • Eyes: Conjunctiva normal, sclera nonicteric  • ENMT: Hearing grossly normal, oral mucosa moist  • Neck: Supple, trachea midline  • Respiratory: On 2 L nasal cannula, no increased work of breathing, normal inspiratory effort  • Cardiovascular: Tachycardic, regular rhythm, no jugular venous distention  • Gastrointestinal: Soft, focally peritonitic in the right lower quadrant, minimally distended  • Skin:  Warm, dry, no rash on visualized skin surfaces  • Musculoskeletal: Symmetric strength, normal gait  • Psychiatric: Alert and oriented ×3, normal affect     REYNA LIMON M.D.  General and Endoscopic Surgery  [facility]    [address] [place] [address], [address]  [place], [place], [facility] [place]  P: [phone]  F: [phone]

## 2021-03-15 NOTE — PROGRESS NOTES
Discharge Planning Assessment  [facility]     Patient Name: Libby Huerta  MRN: [id number]  Today's Date: 3/15/2021    Admit Date: 3/14/2021    Discharge Needs Assessment     Row Name 03/15/21 1616       Living Environment    Lives With  alone    Current Living Arrangements  home/apartment/condo    Primary Care Provided by  self    Provides Primary Care For  no one    Family Caregiver if Needed  significant other    Family Caregiver Names  significant other Bar Self, [phone]    Able to Return to Prior Arrangements  yes       Resource/Environmental Concerns    Resource/Environmental Concerns  none    Transportation Concerns  car, none       Transition Planning    Patient/Family Anticipates Transition to  home with family    Patient/Family Anticipated Services at Transition  none    Transportation Anticipated  car, drives self;family or friend will provide       Discharge Needs Assessment    Readmission Within the Last 30 Days  no previous admission in last 30 days    Equipment Currently Used at Home  none    Concerns to be Addressed  denies needs/concerns at this time;discharge planning    Equipment Needed After Discharge  none    Provided Post Acute Provider List?  N/A    Provided Post Acute Provider Quality & Resource List?  N/A    Patient's Choice of Community Agency(s)  patient denies need for rehab or home health at this time        Discharge Plan     Row Name 03/15/21 1622       Plan    Plan  Home with significant other    Plan Comments  Met with zulay and verified facesheet information.  She lives with her significant other, Bar Self [phone].  She states she has 6 entrance steps and is IADLs.  She has no rehab history. She denies a need for home health or skilled rehab.  Her mother Diamond Padron is at bedside [phone].  She states that either Bar or Diamond will transport at [place].  She prefers Meds to beds.  Her PCP is verified in EPIC. .........................Gladis Li RN        Continued  Care and Services - Admitted Since 3/14/2021    Coordination has not been started for this encounter.         Demographic Summary     Row Name 03/15/21 1614       General Information    Preferred Language  English    Row Name 03/15/21 1609       General Information    Admission Type  inpatient    Arrived From  home    Referral Source  admission list    Preferred Language  English       Contact Information    Permission Granted to Share Info With  ;family/designee    Contact Information Comments  significant other Bar Self, [phone]        Functional Status     Row Name 03/15/21 1610       Functional Status    Usual Activity Tolerance  good    Current Activity Tolerance  moderate       Functional Status, IADL    Medications  independent    Meal Preparation  independent    Housekeeping  independent    Laundry  independent    Shopping  independent       Mental Status    General Appearance WDL  WDL       Mental Status Summary    Recent Changes in Mental Status/Cognitive Functioning  no changes       Employment/    Employment Status  employed full-time        Psychosocial    No documentation.       Abuse/Neglect    No documentation.       Legal    No documentation.       Substance Abuse    No documentation.       Patient Forms    No documentation.           Gladis Li RN

## 2021-03-15 NOTE — ANESTHESIA POSTPROCEDURE EVALUATION
"Patient: Libby Huerta    Procedure Summary     Date: 03/14/21 Room / Location: [facility] OR  / [facility] MAIN OR    Anesthesia Start: 2025 Anesthesia Stop: 2204    Procedure: APPENDECTOMY LAPAROSCOPIC (N/A Abdomen) Diagnosis:     Surgeons: Char Rodriguez MD Provider: Magdi Curran MD    Anesthesia Type: general ASA Status: 3 - Emergent          Anesthesia Type: general    Vitals  Vitals Value Taken Time   BP 88/61 03/14/21 2205   Temp     Pulse 126 03/14/21 2209   Resp     SpO2 94 % 03/14/21 2209   Vitals shown include unvalidated device data.        Post Anesthesia Care and Evaluation    Patient location during evaluation: bedside  Patient participation: complete - patient participated  Level of consciousness: awake and alert  Pain management: adequate  Airway patency: patent  Anesthetic complications: No anesthetic complications    Cardiovascular status: hypotensive and tachycardic  Respiratory status: acceptable  Hydration status: acceptable    Comments: /49 (BP Location: Left arm, Patient Position: Lying)   Pulse (!) 134   Temp (!) 39.5 °C (103.1 °F) (Tympanic)   Resp 16   Ht 165.1 cm (65\")   SpO2 94%   BMI 32.02 kg/m²       "

## 2021-03-15 NOTE — ANESTHESIA PROCEDURE NOTES
Airway  Urgency: elective    Date/Time: 3/14/2021 8:33 PM  Airway not difficult    General Information and Staff    Patient location during procedure: OR  Anesthesiologist: Magdi Curran MD    Indications and Patient Condition  Indications for airway management: airway protection    Preoxygenated: yes  Mask difficulty assessment: 0 - not attempted    Final Airway Details  Final airway type: endotracheal airway      Successful airway: ETT  Cuffed: yes   Successful intubation technique: direct laryngoscopy  Endotracheal tube insertion site: oral  Blade: Brandan  Blade size: 3  ETT size (mm): 7.5  Cormack-Lehane Classification: grade IIa - partial view of glottis  Placement verified by: chest auscultation and capnometry   Measured from: lips  ETT/EBT  to teeth (cm): 21  ETT/EBT  to lips (cm): 21  Number of attempts at approach: 1  Assessment: lips, teeth, and gum same as pre-op and atraumatic intubation

## 2021-03-15 NOTE — OP NOTE
OPERATIVE REPORT     DATE OF OPERATION: 3/14/2021    SURGEON:   Char Rodriguez MD    PREOPERATIVE DIAGNOSIS: Acute ruptured appendicitis    POSTOPERATIVE DIAGNOSIS: Acute ruptured appendicitis    PROCEDURE PERFORMED: Laparoscopic appendectomy    ANESTHESIA: General    SPECIMEN: Appendix    DRAINS: 19 Khmer Iam drain in the left lower quadrant    BLOOD LOSS: Minimal    INDICATIONS FOR OPERATION: Mrs. Libby Huerta is a 35 y.o. year old lady who presented to the emergency room with a 6-day history of right lower quadrant abdominal pain.  CT scan was consistent with ruptured appendicitis with a dense inflammatory reaction.  Laparoscopic appendectomy with possible exploratory laparotomy due to the degree of inflammation were recommended.  All risks (including bleeding, infection, damage to surrounding structures), benefits, and alternatives were explained to the patient and she agreed and wished to proceed.  Informed consent was obtained.    OPERATIVE REPORT: The patient was taken to the operating room, transferred onto the operating room table, and underwent general endotracheal anesthesia without incident. The patient was prepped and draped in the usual sterile fashion.  A Ruiz catheter was placed.  Preoperative antibiotics were given, and a timeout was performed.  Half percent Marcaine with epinephrine was and injected into the skin and subcutaneous tissues. A scalpel was used just superior to the umbilicus and the skin and subcutaneous tissues were dissected to the level of the fascia, which was entered under direct visualization.  0 Vicryl sutures were placed in the fascia.  A Angeles port was placed and the abdomen was insufflated.  A lower midline 5 mm port and a left lower quadrant 5 mm port were then placed under direct visualization.  There was a severe amount of inflammation in the pelvis.  There was a large amount of purulent material around the uterus, ovaries, and cul-de-sac.  The omentum was densely  adherent to the pelvis as well.  This was bluntly dissected and retracted superiorly.  The cecum was identified and the base of the appendix was located.  This was tracked towards the pelvis where was densely adherent to the right tube and ovary.  This was bluntly dissected free.  The area was irrigated obviously.  Once the appendix was no longer adherent to the right tube and ovary, it was retracted superiorly and laterally.  The harmonic device was used to come across the mesoappendix to the base right where it met the cecum.  At this time Dr. Lopez with gynecology was asked to evaluate the gynecologic anatomy due to the amount of inflammation.  See his consult note for his findings but ultimately believed everything appeared viable although inflamed and no further dissection should be done at this time.  The Moonshine stapler with a blue load was then used to take the appendix at its base right where it meets the cecum.  Once this was done, the bag was introduced and the appendix was removed from the abdomen.  The area was then irrigated and inspected for hemostasis.  There was no bleeding noted.  There was a large inflammatory rind and purulent material all throughout the pelvis.  This was irrigated copiously with multiple liters of normal saline.  Once as much of the purulent material was evacuated as possible, a 19 Jamaican Iam drain was passed into the left lower quadrant port.  It was placed along the pelvis and up near the cecum.  It was sutured in place with a 2-0 silk stitch.  The ports were then removed under direct visualization.  The fascial sutures were tied. The incisions were then closed with 4-0 Vicryl sutures and Dermabond.  All needle and lap counts were correct at the end of the case. The patient was awoken from general endotracheal anesthesia and taken to the recovery area for further monitoring.    REYNA LIMON M.D.  General and Endoscopic Surgery  [facility]    [address], [address]  [place], [place], [address]  P: [phone]  F: [phone]

## 2021-03-15 NOTE — PROGRESS NOTES
"General Surgery Progress Note    Summary:  Mrs. Libby Huerta is a 35 y.o. year old lady POD1 s/p lap appendectomy for ruptured appendicitis. On CLD, IVF, bowel regimen. PRN pain medication as needed. D/c teixeira today. Continue zosyn, WBC down to 17 today. Continue drain. Starting lovenox.     Interval Events: No acute events overnight.  Tolerating his clear liquid diet, feels well overall.  Has not been out of bed yet.    Vitals: /74 (BP Location: Left arm, Patient Position: Lying)   Pulse 97   Temp 97.5 °F (36.4 °C) (Oral)   Resp 18   Ht 165.1 cm (65\")   SpO2 95%   BMI 32.02 kg/m²     Drain 140cc    GENERAL: alert, well appearing, and in no distress  HEENT: normocephalic, atraumatic, moist mucous membranes, clear sclerae   CHEST: on 4L NC, no increased work of breathing, symmetric air entry  CARDIAC: regular rate and rhythm    ABDOMEN: Soft, mildly distended, appropriately tender to palpation, KERI serosanguineous  EXTREMITIES: no cyanosis, clubbing, or edema   SKIN: Warm and moist, no rashes    Labs:  Results from last 7 days   Lab Units 03/15/21  0439 03/14/21  1654   WBC 10*3/mm3 17.37* 19.92*   HEMOGLOBIN g/dL 10.8* 11.6*   HEMATOCRIT % 33.0* 35.1   PLATELETS 10*3/mm3 319 397   MONOCYTES % % 3.0*  --      Results from last 7 days   Lab Units 03/15/21  0439 03/14/21  1654   SODIUM mmol/L 136 134*   POTASSIUM mmol/L 4.4 4.1   CHLORIDE mmol/L 108* 99   CO2 mmol/L 19.5* 23.8   BUN mg/dL 11 13   CREATININE mg/dL 0.74 1.11*   CALCIUM mg/dL 7.9* 8.9   BILIRUBIN mg/dL  --  0.9   ALK PHOS U/L  --  88   ALT (SGPT) U/L  --  26   AST (SGOT) U/L  --  10   GLUCOSE mg/dL 135* 112*           REYNA LIMON MD  General and Endoscopic Surgery  [facility]    [address], [address]  [place], [place], [address]  P: [phone]  F: [phone]     "

## 2021-03-15 NOTE — PLAN OF CARE
Goal Outcome Evaluation:  Plan of Care Reviewed With: patient  Progress: improving  Outcome Summary: Pt admitted last night for appendicitis, POD #1 for lap appendectomy. Scheduled Tylenol given, pt does not request any more pain medication. Ice over lap sites. VSS, on 4L NC. NS @125 and IV Abx. SCD's on. Catheter care complete. Clear liquid diet, pt tolerating well. Will continue to monitor closely.

## 2021-03-15 NOTE — CONSULTS
Patient Name:  Libby Huerta  YOB: 1985  MRN:  [id number]  Date of Admission:  3/14/2021  Date of Consult:  3/15/2021  Patient Care Team:  Kristan Langford APRN as PCP - General (Internal Medicine)    Consults  Subjective   History of Present Illness  Ms. Huerta is a 35 y.o. female without significant medical history that has been admitted to [facility] following  an elective laparoscopic APPENDECTOMY.  She has been admitted to a telemetry floor following surgery and we were asked to see and assist with her medical problems.  At the time of my visit she denies any chest pain, SOA at rest, vomiting or diarrhea.  She has tolerated a clear liquid diet.  She does complain of expected postoperative discomfort, especially with movement.  She reports being in a normal state of health leading up to surgery. Since surgery she has complaints of shortness of breath on exertion, polydipsia and blood sugars have been elevated, otherwise she has remained stable.     Past Medical History:   Diagnosis Date   • Anxiety    • Asthma     childhood asthma     Past Surgical History:   Procedure Laterality Date   • APPENDECTOMY N/A 3/14/2021    Procedure: APPENDECTOMY LAPAROSCOPIC;  Surgeon: Char Rodriguez MD;  Location: [facility];  Service: General;  Laterality: N/A;   • LASIK     • TONSILLECTOMY       Family History   Problem Relation Age of Onset   • Celiac disease Mother    • Cancer Mother    • Cervical cancer Mother    • Thyroid disease Maternal Grandmother    • Rheum arthritis Maternal Grandmother    • Crohn's disease Maternal Grandfather      Social History     Tobacco Use   • Smoking status: Former Smoker   • Smokeless tobacco: Never Used   Substance Use Topics   • Alcohol use: Yes     Alcohol/week: 1.0 standard drinks     Types: 1 Glasses of wine per week     Comment: rare   • Drug use: No     No medications prior to admission.     Allergies:  Ceclor [cefaclor]    Review of Systems    Constitutional: Negative for chills and fever.   HENT: Negative for congestion and sore throat.    Eyes: Negative for discharge and itching.   Respiratory: Negative for cough and shortness of breath.    Cardiovascular: Negative for chest pain and palpitations.   Gastrointestinal: Positive for abdominal pain and nausea. Negative for vomiting.   Endocrine: Positive for polydipsia. Negative for cold intolerance and heat intolerance.   Genitourinary: Negative for difficulty urinating and dysuria.   Musculoskeletal: Negative for back pain and gait problem.   Skin: Positive for wound. Negative for color change.   Allergic/Immunologic: Negative for environmental allergies and food allergies.   Neurological: Negative for dizziness and headaches.   Hematological: Negative for adenopathy. Does not bruise/bleed easily.   Psychiatric/Behavioral: Negative for agitation and behavioral problems.       Objective      Vital Signs  Temp:  [97.5 °F (36.4 °C)-103.1 °F (39.5 °C)] 97.5 °F (36.4 °C)  Heart Rate:  [] 85  Resp:  [12-18] 18  BP: ()/(39-99) 112/74  Body mass index is 32.02 kg/m².    Physical Exam  Vitals and nursing note reviewed.   Constitutional:       Appearance: Normal appearance.   HENT:      Head: Normocephalic and atraumatic.   Eyes:      Extraocular Movements: Extraocular movements intact.      Conjunctiva/sclera: Conjunctivae normal.   Cardiovascular:      Rate and Rhythm: Normal rate and regular rhythm.   Pulmonary:      Effort: Pulmonary effort is normal. No respiratory distress.      Breath sounds: Normal breath sounds.   Abdominal:      General: Bowel sounds are normal. There is no distension.      Palpations: Abdomen is soft.      Tenderness: There is abdominal tenderness.   Musculoskeletal:         General: No swelling. Normal range of motion.      Cervical back: Normal range of motion and neck supple.   Skin:     General: Skin is warm and dry.      Comments: KERI drain LLQ, lap sites CDI, open to  air   Neurological:      Mental Status: She is alert and oriented to person, place, and time. Mental status is at baseline.   Psychiatric:         Mood and Affect: Mood normal.         Behavior: Behavior normal.         Results Review:   I reviewed the patient's new clinical results.  I reviewed the patient's new imaging results and agree with the interpretation.  I reviewed the patient's other test results and agree with the interpretation  I personally viewed and interpreted the patient's EKG/Telemetry data         Assessment/Plan     Active Hospital Problems    Diagnosis  POA   • Obesity (BMI 30-39.9) [E66.9]  Yes   • Leukocytosis [D72.829]  Yes   • Elevated random blood glucose level [R73.09]  Yes   • Postoperative hypoxia [R09.02, Z98.890]  Unknown   • Acute appendicitis with localized peritonitis and abscess, without gangrene [K35.33]  Yes       Ms. Huerta is a 35 y.o. female who is s/p APPENDECTOMY LAPAROSCOPIC.    doing well postoperatively. teixeira catheter removed this afternoon. bladder scan as needed and if >350mL, I&O cath. continue supportive care with IVFs, analgesics and antiemetics. encourage IS.  Monitor labs, leukocytosis improving, continue Zosyn. Wean o2 as tolerated. tachycardia has improved.  check a1c as glucose has been running high without a history of DM2 and with complaints of polydipsia.       Thank you very much for asking [facility] to be involved in this patient's care. We will follow along with you.      JOSE Cao  [facility]  03/15/21  16:13 EDT

## 2021-03-15 NOTE — ANESTHESIA PREPROCEDURE EVALUATION
Anesthesia Evaluation     Patient summary reviewed and Nursing notes reviewed   no history of anesthetic complications:               Airway   Mallampati: II  TM distance: >3 FB  Neck ROM: full  no difficulty expected  Dental - normal exam     Pulmonary     breath sounds clear to auscultation  (+) a smoker Former, asthma,  (-) shortness of breath, sleep apnea, decreased breath sounds, wheezes  Cardiovascular - normal exam  Exercise tolerance: good (4-7 METS)    Rhythm: regular  Rate: normal    (-) past MI, angina, CHF, orthopnea, PND, CERDA, PVD    ROS comment: Evolving sepsis    Neuro/Psych  (+) psychiatric history Anxiety,     (-) seizures, neuromuscular disease, TIA, CVA, dizziness/light headedness, weakness, numbness  GI/Hepatic/Renal/Endo    (+) obesity,     (-) liver disease, diabetes    Musculoskeletal (-) negative ROS    Abdominal  - normal exam   Substance History - negative use  (-) alcohol use, drug use     OB/GYN negative ob/gyn ROS         Other - negative ROS                       Anesthesia Plan    ASA 3 - emergent     general   (I discussed with the patient the relevant risks of general anesthesia including, but not limited to, nausea, vomiting, disorientation, post-op delirium, nerve injury, oral/dental injury, awareness, stroke, and death.  I also reviewed any clinically relevant lab and imaging results.)  intravenous induction     Anesthetic plan, all risks, benefits, and alternatives have been provided, discussed and informed consent has been obtained with: patient.    Plan discussed with CRNA.

## 2021-03-16 ENCOUNTER — APPOINTMENT (OUTPATIENT)
Dept: CT IMAGING | Facility: HOSPITAL | Age: 36
End: 2021-03-16

## 2021-03-16 LAB
ANION GAP SERPL CALCULATED.3IONS-SCNC: 7.3 MMOL/L (ref 5–15)
BASOPHILS # BLD AUTO: 0.05 10*3/MM3 (ref 0–0.2)
BASOPHILS NFR BLD AUTO: 0.3 % (ref 0–1.5)
BUN SERPL-MCNC: 17 MG/DL (ref 6–20)
BUN/CREAT SERPL: 20.7 (ref 7–25)
CALCIUM SPEC-SCNC: 8.3 MG/DL (ref 8.6–10.5)
CHLORIDE SERPL-SCNC: 106 MMOL/L (ref 98–107)
CO2 SERPL-SCNC: 22.7 MMOL/L (ref 22–29)
CREAT SERPL-MCNC: 0.82 MG/DL (ref 0.57–1)
DEPRECATED RDW RBC AUTO: 37.6 FL (ref 37–54)
EOSINOPHIL # BLD AUTO: 0 10*3/MM3 (ref 0–0.4)
EOSINOPHIL NFR BLD AUTO: 0 % (ref 0.3–6.2)
ERYTHROCYTE [DISTWIDTH] IN BLOOD BY AUTOMATED COUNT: 11.9 % (ref 12.3–15.4)
GFR SERPL CREATININE-BSD FRML MDRD: 80 ML/MIN/1.73
GLUCOSE SERPL-MCNC: 100 MG/DL (ref 65–99)
HCT VFR BLD AUTO: 31 % (ref 34–46.6)
HGB BLD-MCNC: 10.4 G/DL (ref 12–15.9)
LAB AP CASE REPORT: NORMAL
LYMPHOCYTES # BLD AUTO: 1.29 10*3/MM3 (ref 0.7–3.1)
LYMPHOCYTES NFR BLD AUTO: 6.8 % (ref 19.6–45.3)
MCH RBC QN AUTO: 29.8 PG (ref 26.6–33)
MCHC RBC AUTO-ENTMCNC: 33.5 G/DL (ref 31.5–35.7)
MCV RBC AUTO: 88.8 FL (ref 79–97)
MONOCYTES # BLD AUTO: 0.82 10*3/MM3 (ref 0.1–0.9)
MONOCYTES NFR BLD AUTO: 4.3 % (ref 5–12)
NEUTROPHILS NFR BLD AUTO: 16.6 10*3/MM3 (ref 1.7–7)
NEUTROPHILS NFR BLD AUTO: 87.3 % (ref 42.7–76)
PATH REPORT.FINAL DX SPEC: NORMAL
PATH REPORT.GROSS SPEC: NORMAL
PLATELET # BLD AUTO: 360 10*3/MM3 (ref 140–450)
PMV BLD AUTO: 10.2 FL (ref 6–12)
POTASSIUM SERPL-SCNC: 4 MMOL/L (ref 3.5–5.2)
RBC # BLD AUTO: 3.49 10*6/MM3 (ref 3.77–5.28)
SODIUM SERPL-SCNC: 136 MMOL/L (ref 136–145)
WBC # BLD AUTO: 19.01 10*3/MM3 (ref 3.4–10.8)

## 2021-03-16 PROCEDURE — 25010000002 ENOXAPARIN PER 10 MG: Performed by: STUDENT IN AN ORGANIZED HEALTH CARE EDUCATION/TRAINING PROGRAM

## 2021-03-16 PROCEDURE — 25010000002 PIPERACILLIN SOD-TAZOBACTAM PER 1 G: Performed by: STUDENT IN AN ORGANIZED HEALTH CARE EDUCATION/TRAINING PROGRAM

## 2021-03-16 PROCEDURE — 99024 POSTOP FOLLOW-UP VISIT: CPT | Performed by: STUDENT IN AN ORGANIZED HEALTH CARE EDUCATION/TRAINING PROGRAM

## 2021-03-16 PROCEDURE — 25010000002 HYDROMORPHONE PER 4 MG: Performed by: STUDENT IN AN ORGANIZED HEALTH CARE EDUCATION/TRAINING PROGRAM

## 2021-03-16 PROCEDURE — 71250 CT THORAX DX C-: CPT

## 2021-03-16 PROCEDURE — 85025 COMPLETE CBC W/AUTO DIFF WBC: CPT | Performed by: STUDENT IN AN ORGANIZED HEALTH CARE EDUCATION/TRAINING PROGRAM

## 2021-03-16 PROCEDURE — 80048 BASIC METABOLIC PNL TOTAL CA: CPT | Performed by: STUDENT IN AN ORGANIZED HEALTH CARE EDUCATION/TRAINING PROGRAM

## 2021-03-16 RX ADMIN — IBUPROFEN 800 MG: 800 TABLET, FILM COATED ORAL at 09:11

## 2021-03-16 RX ADMIN — SODIUM CHLORIDE 50 ML/HR: 9 INJECTION, SOLUTION INTRAVENOUS at 01:43

## 2021-03-16 RX ADMIN — TAZOBACTAM SODIUM AND PIPERACILLIN SODIUM 3.38 G: 375; 3 INJECTION, SOLUTION INTRAVENOUS at 01:44

## 2021-03-16 RX ADMIN — IBUPROFEN 800 MG: 800 TABLET, FILM COATED ORAL at 20:19

## 2021-03-16 RX ADMIN — IBUPROFEN 800 MG: 800 TABLET, FILM COATED ORAL at 15:05

## 2021-03-16 RX ADMIN — TAZOBACTAM SODIUM AND PIPERACILLIN SODIUM 3.38 G: 375; 3 INJECTION, SOLUTION INTRAVENOUS at 17:51

## 2021-03-16 RX ADMIN — TAZOBACTAM SODIUM AND PIPERACILLIN SODIUM 3.38 G: 375; 3 INJECTION, SOLUTION INTRAVENOUS at 09:12

## 2021-03-16 RX ADMIN — SODIUM CHLORIDE, PRESERVATIVE FREE 10 ML: 5 INJECTION INTRAVENOUS at 20:20

## 2021-03-16 RX ADMIN — ENOXAPARIN SODIUM 40 MG: 40 INJECTION SUBCUTANEOUS at 20:19

## 2021-03-16 RX ADMIN — ACETAMINOPHEN 1000 MG: 500 TABLET, FILM COATED ORAL at 15:06

## 2021-03-16 RX ADMIN — HYDROMORPHONE HYDROCHLORIDE 0.5 MG: 1 INJECTION, SOLUTION INTRAMUSCULAR; INTRAVENOUS; SUBCUTANEOUS at 15:06

## 2021-03-16 RX ADMIN — SODIUM CHLORIDE, PRESERVATIVE FREE 10 ML: 5 INJECTION INTRAVENOUS at 09:11

## 2021-03-16 RX ADMIN — ACETAMINOPHEN 1000 MG: 500 TABLET, FILM COATED ORAL at 01:43

## 2021-03-16 RX ADMIN — ACETAMINOPHEN 1000 MG: 500 TABLET, FILM COATED ORAL at 09:11

## 2021-03-16 NOTE — PLAN OF CARE
Problem: Nutrition Impaired (Sepsis/Septic Shock)  Goal: Optimal Nutrition Intake  Outcome: Ongoing, Progressing     Problem: Adult Inpatient Plan of Care  Goal: Plan of Care Review  Outcome: Ongoing, Progressing  Flowsheets  Taken 3/16/2021 1805 by Ese Walden RN  Progress: improving  Outcome Summary: VSS. Pt ambulated in lopez and took shower independently. VSS. RA.  Taken 3/16/2021 0735 by Missy Del Rio RN  Plan of Care Reviewed With: patient  Goal: Patient-Specific Goal (Individualized)  Outcome: Ongoing, Progressing  Goal: Absence of Hospital-Acquired Illness or Injury  Outcome: Ongoing, Progressing  Intervention: Identify and Manage Fall Risk  Recent Flowsheet Documentation  Taken 3/16/2021 1631 by Ese Walden RN  Safety Promotion/Fall Prevention:   activity supervised   clutter free environment maintained   fall prevention program maintained   nonskid shoes/slippers when out of bed   room organization consistent   safety round/check completed  Intervention: Prevent Skin Injury  Recent Flowsheet Documentation  Taken 3/16/2021 1755 by Ese Walden RN  Body Position: (up in chair) position changed independently  Taken 3/16/2021 1631 by Ese Walden RN  Body Position: position changed independently  Taken 3/16/2021 0911 by Ese Walden RN  Body Position:   supine   position changed independently  Intervention: Prevent Infection  Recent Flowsheet Documentation  Taken 3/16/2021 1631 by Ese Walden RN  Infection Prevention:   visitors restricted/screened   single patient room provided   rest/sleep promoted   hand hygiene promoted  Taken 3/16/2021 0911 by Ese Walden RN  Infection Prevention:   single patient room provided   rest/sleep promoted   hand hygiene promoted   visitors restricted/screened  Goal: Optimal Comfort and Wellbeing  Outcome: Ongoing, Progressing  Intervention: Provide Person-Centered Care  Recent Flowsheet Documentation  Taken 3/16/2021  0911 by Ese Walden RN  Trust Relationship/Rapport:   care explained   questions encouraged   questions answered   thoughts/feelings acknowledged   reassurance provided  Goal: Readiness for Transition of Care  Outcome: Ongoing, Progressing     Problem: Adjustment to Illness (Sepsis/Septic Shock)  Goal: Optimal Coping  Outcome: Ongoing, Progressing  Intervention: Optimize Psychosocial Adjustment to Illness  Recent Flowsheet Documentation  Taken 3/16/2021 1631 by Ese Walden RN  Supportive Measures:   active listening utilized   decision-making supported   relaxation techniques promoted   self-care encouraged  Family/Support System Care: self-care encouraged  Taken 3/16/2021 0911 by Ese Walden RN  Supportive Measures:   active listening utilized   positive reinforcement provided   self-care encouraged   verbalization of feelings encouraged     Problem: Bleeding (Sepsis/Septic Shock)  Goal: Absence of Bleeding  Outcome: Ongoing, Progressing  Intervention: Minimize Bleeding Risk  Recent Flowsheet Documentation  Taken 3/16/2021 0911 by Ese Walden RN  Bleeding Precautions: monitored for signs of bleeding  Bleeding Management: dressing monitored     Problem: Glycemic Control Impaired (Sepsis/Septic Shock)  Goal: Blood Glucose Level Within Desired Range  Outcome: Ongoing, Progressing  Intervention: Optimize Glycemic Control  Recent Flowsheet Documentation  Taken 3/16/2021 0911 by Ese Walden RN  Glycemic Management: oral hydration promoted     Problem: Hemodynamic Instability (Sepsis/Septic Shock)  Goal: Effective Tissue Perfusion  Outcome: Ongoing, Progressing  Intervention: Optimize Blood Flow  Recent Flowsheet Documentation  Taken 3/16/2021 0911 by Ese Walden RN  Fluid/Electrolyte Management: fluids provided     Problem: Infection (Sepsis/Septic Shock)  Goal: Absence of Infection Signs and Symptoms  Outcome: Ongoing, Progressing  Intervention: Prevent or Manage Infection  Progression  Recent Flowsheet Documentation  Taken 3/16/2021 1631 by Ese Walden RN  Infection Prevention:   visitors restricted/screened   single patient room provided   rest/sleep promoted   hand hygiene promoted  Taken 3/16/2021 0911 by Ese Walden RN  Infection Prevention:   single patient room provided   rest/sleep promoted   hand hygiene promoted   visitors restricted/screened     Problem: Nutrition Impaired (Sepsis/Septic Shock)  Goal: Optimal Nutrition Intake  Outcome: Ongoing, Progressing     Problem: Respiratory Compromise (Sepsis/Septic Shock)  Goal: Effective Oxygenation and Ventilation  Outcome: Ongoing, Progressing  Intervention: Promote Airway Secretion Clearance  Recent Flowsheet Documentation  Taken 3/16/2021 1631 by Ese Walden RN  Activity Management:   up ad romy   activity adjusted per tolerance  Breathing Techniques/Airway Clearance: deep/controlled cough encouraged  Taken 3/16/2021 0911 by Ese Walden RN  Activity Management: activity adjusted per tolerance  Breathing Techniques/Airway Clearance: deep/controlled cough encouraged  Cough And Deep Breathing: done independently per patient  Intervention: Optimize Oxygenation and Ventilation  Recent Flowsheet Documentation  Taken 3/16/2021 1631 by Ese Walden RN  Head of Bed (HOB): HOB elevated  Taken 3/16/2021 0911 by Ese Walden RN  Head of Bed (HOB): HOB at 20-30 degrees     Problem: Bleeding (Appendectomy)  Goal: Absence of Bleeding  Outcome: Ongoing, Progressing  Intervention: Monitor and Manage Bleeding  Recent Flowsheet Documentation  Taken 3/16/2021 0911 by Ese Walden RN  Bleeding Management: dressing monitored     Problem: Bowel Elimination Impaired (Appendectomy)  Goal: Effective Bowel Elimination  Outcome: Ongoing, Progressing  Intervention: Promote Effective Bowel Elimination  Recent Flowsheet Documentation  Taken 3/16/2021 0911 by Ese Walden RN  Bowel Elimination  Promotion:   adequate fluid intake promoted   ambulation promoted   diet adjusted   privacy promoted     Problem: Infection (Appendectomy)  Goal: Absence of Infection Signs and Symptoms  Outcome: Ongoing, Progressing     Problem: Ongoing Anesthesia Effects (Appendectomy)  Goal: Anesthesia/Sedation Recovery  Outcome: Ongoing, Progressing  Intervention: Optimize Anesthesia Recovery  Recent Flowsheet Documentation  Taken 3/16/2021 1631 by Ese Walden RN  Safety Promotion/Fall Prevention:   activity supervised   clutter free environment maintained   fall prevention program maintained   nonskid shoes/slippers when out of bed   room organization consistent   safety round/check completed  Reorientation Measures: clock in view  Taken 3/16/2021 0911 by Ese Walden RN  Reorientation Measures: clock in view     Problem: Pain (Appendectomy)  Goal: Acceptable Pain Control  Outcome: Ongoing, Progressing  Intervention: Prevent or Manage Pain  Recent Flowsheet Documentation  Taken 3/16/2021 1755 by Ese Walden RN  Pain Management Interventions: pain management plan reviewed with patient/caregiver     Problem: Postoperative Nausea and Vomiting (Appendectomy)  Goal: Nausea and Vomiting Relief  Outcome: Ongoing, Progressing     Problem: Postoperative Urinary Retention (Appendectomy)  Goal: Effective Urinary Elimination  Outcome: Ongoing, Progressing  Intervention: Monitor and Manage Urinary Retention  Recent Flowsheet Documentation  Taken 3/16/2021 1631 by Ese Walden RN  Urinary Elimination Promotion: frequent voiding encouraged  Taken 3/16/2021 0911 by Ese Walden RN  Urinary Elimination Promotion:   toileting offered   absorbent pad/diaper use encouraged   Goal Outcome Evaluation:     Progress: improving  Outcome Summary: VSS. Pt ambulated in [place]opez and took shower independently. VSS. RA.

## 2021-03-16 NOTE — CONSULTS
Referring Provider: JOSE Brown  Reason for Consultation: Abnormal CT chest    Patient Care Team:  Kristan Langford APRN as PCP - General (Internal Medicine)    Chief complaint:   Abdominal pain    History of present illness:  Subjective     This is a 35-year-old female patient, lifelong non-smoker with no prior history of lung disease.    She was admitted to the hospital on 3/14 with abdominal pain and was found to have ruptured appendicitis.  She underwent emergent lap appendectomy.  On CT abdomen on presentation, she had numerous air-filled cystic lesion which prompted a CT of the chest demonstrating the same findings and suspicious for PEÑA.  We were consulted for this matter.     On questioning, patient indicated prior history of asthma and allergy but she has been off inhalers for years.  She also had a history of pneumonia when she was 11 years old according to her mother who was present in the room today.  She did not have any lung imaging recently.  She stated that she has trouble breathing but activities which she attributed to the use of mask during the COVID-19 pandemic and gaining weight but she never was very symptomatic to the point that she needed to be evaluated from this perspective.  She denies chronic cough or current cough.  She is not aware of any pulmonary disease in her family but she does not know her father's side.  On her mother's sides, there is no lung disease or autoimmune disease or any genetic condition that would stand out.    Patient stated that she was on Depo shots for birth control for 12 years but has been off it for a while.    Labs reviewed: WBC 17 K and hemoglobin 10.  Reviewed the pathology report of the appendix which is consistent with appendicitis.       Review of Systems  Constitutional: No current fever or chills.   ENMT: No sinus congestion  Cardiovascular: No chest pain, palpitation or legs swelling.    Respiratory: No current dyspnea, cough  or wheezing.  Gastrointestinal: Mild abdominal pain due to surgery.  She just started eating today.  No nausea or vomiting.  Neurology: No headache, weakness, numbness or dizziness.   Musculoskeletal: No joints pain, stiffness or swelling.   Psychiatry: No depression.  Genitourinary: No dysuria or frequent urination  Endo: No weight changes. No cold or warm intolerance.  Lymphatic: No swollen glands.  Integumentary: No rash.    History  Past Medical History:   Diagnosis Date   • Anxiety    • Asthma     childhood asthma   ,   Past Surgical History:   Procedure Laterality Date   • APPENDECTOMY N/A 3/14/2021    Procedure: APPENDECTOMY LAPAROSCOPIC;  Surgeon: Char Rodriguez MD;  Location: [facility];  Service: General;  Laterality: N/A;   • LASIK     • TONSILLECTOMY     ,   Family History   Problem Relation Age of Onset   • Celiac disease Mother    • Cancer Mother    • Cervical cancer Mother    • Thyroid disease Maternal Grandmother    • Rheum arthritis Maternal Grandmother    • Crohn's disease Maternal Grandfather    ,   Social History     Socioeconomic History   • Marital status: Single     Spouse name: Not on file   • Number of children: Not on file   • Years of education: Not on file   • Highest education level: Not on file   Tobacco Use   • Smoking status: Former Smoker   • Smokeless tobacco: Never Used   Substance and Sexual Activity   • Alcohol use: Yes     Alcohol/week: 1.0 standard drinks     Types: 1 Glasses of wine per week     Comment: rare   • Drug use: No   • Sexual activity: Yes     Partners: Male       ,   No medications prior to admission.   , Scheduled Meds:  acetaminophen, 1,000 mg, Oral, Q8H  enoxaparin, 40 mg, Subcutaneous, Nightly  ibuprofen, 800 mg, Oral, TID  piperacillin-tazobactam, 3.375 g, Intravenous, Q8H  polyethylene glycol, 17 g, Oral, Daily  sodium chloride, 10 mL, Intravenous, Q12H    , Continuous Infusions:    and Allergies:  Ceclor [cefaclor]    Objective     Vital Signs   Temp:   [97.6 °F (36.4 °C)-98.2 °F (36.8 °C)] 98 °F (36.7 °C)  Heart Rate:  [79-88] 88  Resp:  [18] 18  BP: (104-128)/(73-86) 128/80    Physical Exam:  Constitutional: Not in acute distress.  Eyes: Injected conjunctivae, EOMI. pupils equal reactive to light.  ENMT: Mouth not examined due to presence of mask.  External ears normal.  Neck: Large. Trachea midline. No thyromegaly  Heart: RRR, no murmur  Lungs: Good and equal air entry bilaterally but slightly diminished at the bases.  No crackles.  No wheezing.  Non labored breathing.   Abdomen: Obese. Soft. No tenderness or dullness. No HSM.  Extremities: No cyanosis, clubbing or pitting edema.  Warm extremities and well-perfused.  Neuro: Conscious, alert, oriented x3.  Strength 5/5 in arms.  Psych: Appropriate mood and affect.    Integumentary: No rash.  Normal skin turgor  Lymphatic: No palpable cervical or supraclavicular lymph nodes.      Diagnostic imaging:  I personally and independently reviewed the following images:     CT chest 3/16/21:  Small bilateral pleural effusion and bilateral lower lobe atelectasis.  Multiple cystic lesions in the lungs.        Assessment   1. 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  but does establish the diagnosis without other findings occluding laboratory testing  3. 5 mm RML nodule  4. Small bilateral pleural effusion, likely reactive from appendicitis and sepsis (not present on initial CT 3/14 and therefore not related to the presumed diagnosis of PEÑA).  Does not seem to have extrapulmonary manifestation.  5. Small bilateral lower lobe atelectasis, (not present on initial CT 3/14)  6. Ruptured appendicitis s/p lap appendectomy 3/14/2021  7. Sepsis, skin #6      Recommendations:    · Outpatient testing: Alpha-1 antitrypsin, rheumatoid factor, CHRIS, SSB and SSA, serum and urine protein electrophoresis, VEGF-D- Will also need PFT and serial imaging.     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  She's certainly not a severe case at this point and it is questionable whether she is symptomatic from her disease or no (, suspect that she does not have much symptoms from her condition) and therefore there is no need for any kind of therapy at this point.  She will just need to be monitored serially.  Suggested that she sign up online to PEÑA foundation or other social groups to get help. Will also follow her as outpatient periodically as stated above and if there is suspicion of worsening disease or symptomatic disease then we can refer her to a tertiary care center but again she does not need any of this at the present point and its only an incidental discovery.  Discussed avoiding birth control/hormonal therapy as this can exacerbate the condition    · Agree antibiotics per primary team for sepsis secondary to ruptured appendicitis    Discussed with her mother at bedside      Anatoliy Bai MD  03/16/21  17:40 EDT

## 2021-03-16 NOTE — PROGRESS NOTES
"General Surgery Progress Note    Summary:  Mrs. Libby Huerta is a 35 y.o. year old lady POD2 s/p lap appendectomy for ruptured appendicitis. Afebrile, WBC 19 from 17, on zosyn, continue to monitor. Advance to regular diet, on bowel regimen. Lovenox.      Interval Events: No acute events overnight.  Tolerating a clear liquid diet.  Had a bowel movement.  Got out of bed a few times.    Vitals: /73 (BP Location: Left arm, Patient Position: Lying)   Pulse 79   Temp 97.6 °F (36.4 °C) (Oral)   Resp 18   Ht 165.1 cm (65\")   SpO2 91%   BMI 32.02 kg/m²     Drain 105cc    GENERAL: alert, well appearing, and in no distress  HEENT: normocephalic, atraumatic, moist mucous membranes, clear sclerae   CHEST: on 1L NC, no increased work of breathing, symmetric air entry  CARDIAC: regular rate and rhythm    ABDOMEN: Soft, mildly distended, appropriately tender to palpation, KERI with some purulent drainage  EXTREMITIES: no cyanosis, clubbing, or edema   SKIN: Warm and moist, no rashes    Labs:  Results from last 7 days   Lab Units 03/16/21 0424 03/15/21  0439 03/14/21  1654   WBC 10*3/mm3 19.01* 17.37* 19.92*   HEMOGLOBIN g/dL 10.4* 10.8* 11.6*   HEMATOCRIT % 31.0* 33.0* 35.1   PLATELETS 10*3/mm3 360 319 397   MONOCYTES % %  --  3.0*  --      Results from last 7 days   Lab Units 03/16/21  0424 03/15/21  0439 03/14/21  1654   SODIUM mmol/L 136 136 134*   POTASSIUM mmol/L 4.0 4.4 4.1   CHLORIDE mmol/L 106 108* 99   CO2 mmol/L 22.7 19.5* 23.8   BUN mg/dL 17 11 13   CREATININE mg/dL 0.82 0.74 1.11*   CALCIUM mg/dL 8.3* 7.9* 8.9   BILIRUBIN mg/dL  --   --  0.9   ALK PHOS U/L  --   --  88   ALT (SGPT) U/L  --   --  26   AST (SGOT) U/L  --   --  10   GLUCOSE mg/dL 100* 135* 112*           REYNA LIMON MD  General and Endoscopic Surgery  [facility]    [address], [address]  [place], [place], [address]  P: [phone]  F: [phone]     "

## 2021-03-16 NOTE — PLAN OF CARE
Goal Outcome Evaluation:  Plan of Care Reviewed With: patient  Progress: improving  Outcome Summary: Pleasant pt, VSS. NS @ 50 and IV abx. PT remains on 1L NC. Ambulating well to bathroom. IS q2 hours while awake. Tolerating diet well. Dilaudid given x1 PRN. No significant changes overnight. Will continue to monitor closely.

## 2021-03-16 NOTE — PAYOR COMM NOTE
"Janelle Huerta (35 y.o. Female)     ATTN: NOTIFICATION OF ADMISSION:  [id number]  RECEIVED MEDICAID # TODAY     PLEASE REPLY TO [facility] DEPT: YAHIR QUICK RN,CCP; [phone],  [phone]        Date of Birth Social Security Number Address Home Phone MRN    1985  [id number] [id number] [phone] [id number]    Faith Marital Status          Unknown Single       Admission Date Admission Type Admitting Provider Attending Provider Department, Room/Bed    3/14/21 Emergency Char Rodriguez MD  [address], S414/1    Discharge Date Discharge Disposition Discharge Destination                       Attending Provider: (none)   Allergies: Ceclor [Cefaclor]    Isolation: None   Infection: None   Code Status: CPR    Ht: 165.1 cm (65\")   Wt: 105 kg (231 lb)    Admission Cmt: None   Principal Problem: None                Active Insurance as of 3/14/2021     Primary Coverage     Payor Plan Insurance Group Employer/Plan Group    [facility] COMMUNITY PLAN [facility] PLAN [facility]      Payor Plan Address Payor Plan Phone Number Payor Plan Fax Number Effective Dates    [address]   3/14/2021 - None Entered    [facility] [phone]       Subscriber Name Subscriber Birth Date Member ID       JANELLE HUERTA 1985 [id number]                 Emergency Contacts      (Rel.) Home Phone Work Phone Mobile Phone    Bar Self (Significant Other) [phone] -- --    VitoMaia (Mother) -- -- [phone]               History & Physical      Char Rodriguez MD at 03/14/21 2016          General Surgery history and physical exam    Summary:    Mrs. Janelle Huerta is a 35 y.o. year old lady with likely ruptured appendicitis with sepsis.  She is tachycardic to the 140s and has a temperature 103.5.  We will plan on OR tonight for diagnostic laparoscopy, likely appendectomy.we did discuss in detail the possibility of being unable to identify the appendix and " "performing a washout and leaving a drain versus an exploratory laparotomy and bowel resection.  All risks (including bleeding, infection, damage to surrounding structures, anastomotic leak), benefits, and alternatives were explained to the patient and her boyfriend and she agreed and wished to proceed.    Chief Complaint:    Abdominal pain    History of Present Illness:    Mrs. Libby Huerta is a 35 y.o. year old lady who presents to the ER with a 6-day history of worsening fatigue and abdominal pain.  She states she is unable to go to bed for the past 3 days.  She has had worsening right lower quadrant abdominal pain and began having watery diarrhea.  She denies any prior similar episodes or sick contacts.  She was on her period 2 days ago.    Past Medical History:   • None    Past Surgical History:    • Tonsillectomy    Family History:    • Mother with cervical cancer  • Maternal aunt with Crohn's disease    Social History:    • Denies tobacco use  • Occasional alcohol use    Allergies:   Allergies   Allergen Reactions   • Ceclor [Cefaclor] Shortness Of Breath     Per mom \"trouble breathing\"       Medications:     Current Facility-Administered Medications:   •  [COMPLETED] Insert peripheral IV, , , Once **AND** sodium chloride 0.9 % flush 10 mL, 10 mL, Intravenous, PRN, Sean Sanchez PA  No current outpatient medications on file.    Radiology/Endoscopy:    • CT scan reviewed; likely appendicitis, dense inflammatory reaction and fluid in the right lower quadrant, no free air    Labs:    • White blood cell count 19.9 hemoglobin 11.6 platelets 397 creatinine 1.1 CO2 23    Review of Systems:   Influenza-like illness: no fever, no  cough, no  sore throat, no  body aches, no loss of sense of taste or smell, no known exposure to person with Covid-19.  Constitutional: Negative for fevers or chills  HENT: Negative for hearing loss or runny nose  Eyes: Negative for vision changes or scleral icterus  Respiratory: Negative " for cough or shortness of breath  Cardiovascular: Negative for chest pain or heart palpitations  Gastrointestinal: Positive for abdominal pain; negative for nausea, vomiting, constipation, melena, or hematochezia  Genitourinary: Negative for hematuria or dysuria  Musculoskeletal: Negative for joint swelling or gait instability  Neurologic: Negative for tremors or seizures  Psychiatric: Negative for suicidal ideations or depression  All other systems reviewed and negative    Physical Exam:   • Constitutional: Well-developed, well-nourished, appears unwell, diaphoretic  • Vital signs: Temperature 103.1 heart rate 134 respiratory rate 16 blood pressure 112/49  • Eyes: Conjunctiva normal, sclera nonicteric  • ENMT: Hearing grossly normal, oral mucosa moist  • Neck: Supple, trachea midline  • Respiratory: On 2 L nasal cannula, no increased work of breathing, normal inspiratory effort  • Cardiovascular: Tachycardic, regular rhythm, no jugular venous distention  • Gastrointestinal: Soft, focally peritonitic in the right lower quadrant, minimally distended  • Skin:  Warm, dry, no rash on visualized skin surfaces  • Musculoskeletal: Symmetric strength, normal gait  • Psychiatric: Alert and oriented ×3, normal affect     CHAR LIMON M.D.  General and Endoscopic Surgery  [facility]    [address], [address]  [place], [place], [address]  P: [phone]  F: [phone]       Electronically signed by Char Limon MD at 03/14/21 2021          Emergency Department Notes      Arcenio Melgoza RN at 03/14/21 1548        Pt reports generalized abd. Pain x 3 days pt states diarrhea started today. denies nausea or vomiting.    Patient masked in first look triage. I was wearing mask and goggles.       Electronically signed by Arcenio Melgoza RN at 03/14/21 9320     Sean Sanchez PA at 03/14/21 1626           EMERGENCY DEPARTMENT ENCOUNTER    Room Number:  02/02  Date of encounter:  3/14/2021  PCP: Kristan Langford  JOSE  Historian: Patient      I used full protective equipment while examining this patient.  This includes face mask, gloves and protective eyewear.  I washed my hands before entering the room and immediately upon leaving the room      HPI:  Chief Complaint: Abdominal pain, fatigue  A complete HPI/ROS/PMH/PSH/SH/FH are unobtainable due to: Nothing    Context: Libby Huerta is a 35 y.o. female who presents to the ED c/o 4 to 5-day history of fatigue, malaise, progressive abdominal pain and distention, as well as 1 episode of watery diarrhea today.  Patient denies any known fever, chest pain, shortness of breath, vomiting, dysuria, vaginal discharge.  Patient denies any previous similar symptoms.  She denies any ill contacts.  She describes her abdominal pain is generalized and moderate in intensity.  She denies any precipitating alleviating factors.    Review of Medical Records  No previous pertinent medical records.    PAST MEDICAL HISTORY  Active Ambulatory Problems     Diagnosis Date Noted   • No Active Ambulatory Problems     Resolved Ambulatory Problems     Diagnosis Date Noted   • No Resolved Ambulatory Problems     Past Medical History:   Diagnosis Date   • Anxiety    • Asthma          PAST SURGICAL HISTORY  Past Surgical History:   Procedure Laterality Date   • LASIK     • TONSILLECTOMY           FAMILY HISTORY  Family History   Problem Relation Age of Onset   • Celiac disease Mother    • Cancer Mother    • Cervical cancer Mother    • Thyroid disease Maternal Grandmother    • Rheum arthritis Maternal Grandmother    • Crohn's disease Maternal Grandfather          SOCIAL HISTORY  Social History     Socioeconomic History   • Marital status: Single     Spouse name: Not on file   • Number of children: Not on file   • Years of education: Not on file   • Highest education level: Not on file   Tobacco Use   • Smoking status: Former Smoker   • Smokeless tobacco: Never Used   Substance and Sexual Activity   • Alcohol  use: Yes     Alcohol/week: 1.0 standard drinks     Types: 1 Glasses of wine per week     Comment: rare   • Drug use: No   • Sexual activity: Yes     Partners: Male         ALLERGIES  Ceclor [cefaclor]        REVIEW OF SYSTEMS  All systems reviewed and negative except for those discussed in HPI.       PHYSICAL EXAM    I have reviewed the triage vital signs and nursing notes.    ED Triage Vitals [03/14/21 1548]   Temp Heart Rate Resp BP SpO2   (!) 100.9 °F (38.3 °C) (!) 151 18 -- 97 %      Temp src Heart Rate Source Patient Position BP Location FiO2 (%)   Tympanic -- -- -- --       Physical Exam  GENERAL: Alert, oriented, mildly ill-appearing, not distressed  HENT: head atraumatic, no nuchal rigidity  EYES: no scleral icterus, EOMI  CV: regular rhythm, regular rate, no murmur  RESPIRATORY: normal effort, CTA  ABDOMEN: soft, moderate diffuse abdominal tenderness without guarding rebound.  No masses.  MUSCULOSKELETAL: no deformity, FROM, no calf swelling or tenderness  NEURO: alert, moves all extremities, follows commands  SKIN: warm, dry        LAB RESULTS  Recent Results (from the past 24 hour(s))   Comprehensive Metabolic Panel    Collection Time: 03/14/21  4:54 PM    Specimen: Blood   Result Value Ref Range    Glucose 112 (H) 65 - 99 mg/dL    BUN 13 6 - 20 mg/dL    Creatinine 1.11 (H) 0.57 - 1.00 mg/dL    Sodium 134 (L) 136 - 145 mmol/L    Potassium 4.1 3.5 - 5.2 mmol/L    Chloride 99 98 - 107 mmol/L    CO2 23.8 22.0 - 29.0 mmol/L    Calcium 8.9 8.6 - 10.5 mg/dL    Total Protein 7.7 6.0 - 8.5 g/dL    Albumin 3.30 (L) 3.50 - 5.20 g/dL    ALT (SGPT) 26 1 - 33 U/L    AST (SGOT) 10 1 - 32 U/L    Alkaline Phosphatase 88 39 - 117 U/L    Total Bilirubin 0.9 0.0 - 1.2 mg/dL    eGFR Non African Amer 57 (L) >60 mL/min/1.73    Globulin 4.4 gm/dL    A/G Ratio 0.8 g/dL    BUN/Creatinine Ratio 11.7 7.0 - 25.0    Anion Gap 11.2 5.0 - 15.0 mmol/L   Lipase    Collection Time: 03/14/21  4:54 PM    Specimen: Blood   Result Value Ref  Range    Lipase 19 13 - 60 U/L   hCG, Serum, Qualitative    Collection Time: 03/14/21  4:54 PM    Specimen: Blood   Result Value Ref Range    HCG Qualitative Negative Negative   CBC Auto Differential    Collection Time: 03/14/21  4:54 PM    Specimen: Blood   Result Value Ref Range    WBC 19.92 (H) 3.40 - 10.80 10*3/mm3    RBC 3.99 3.77 - 5.28 10*6/mm3    Hemoglobin 11.6 (L) 12.0 - 15.9 g/dL    Hematocrit 35.1 34.0 - 46.6 %    MCV 88.0 79.0 - 97.0 fL    MCH 29.1 26.6 - 33.0 pg    MCHC 33.0 31.5 - 35.7 g/dL    RDW 12.0 (L) 12.3 - 15.4 %    RDW-SD 38.3 37.0 - 54.0 fl    MPV 9.6 6.0 - 12.0 fL    Platelets 397 140 - 450 10*3/mm3    Neutrophil % 89.7 (H) 42.7 - 76.0 %    Lymphocyte % 6.2 (L) 19.6 - 45.3 %    Monocyte % 3.1 (L) 5.0 - 12.0 %    Eosinophil % 0.0 (L) 0.3 - 6.2 %    Basophil % 0.4 0.0 - 1.5 %    Immature Grans % 0.6 (H) 0.0 - 0.5 %    Neutrophils, Absolute 17.88 (H) 1.70 - 7.00 10*3/mm3    Lymphocytes, Absolute 1.24 0.70 - 3.10 10*3/mm3    Monocytes, Absolute 0.62 0.10 - 0.90 10*3/mm3    Eosinophils, Absolute 0.00 0.00 - 0.40 10*3/mm3    Basophils, Absolute 0.07 0.00 - 0.20 10*3/mm3    Immature Grans, Absolute 0.11 (H) 0.00 - 0.05 10*3/mm3    nRBC 0.0 0.0 - 0.2 /100 WBC   Procalcitonin    Collection Time: 03/14/21  4:54 PM    Specimen: Blood   Result Value Ref Range    Procalcitonin 8.96 (H) 0.00 - 0.25 ng/mL   Urinalysis With Microscopic If Indicated (No Culture) - Urine, Clean Catch    Collection Time: 03/14/21  6:32 PM    Specimen: Urine, Clean Catch   Result Value Ref Range    Color, UA Yellow Yellow, Straw    Appearance, UA Clear Clear    pH, UA 6.5 5.0 - 8.0    Specific Gravity, UA 1.029 1.005 - 1.030    Glucose, UA Negative Negative    Ketones, UA Negative Negative    Bilirubin, UA Negative Negative    Blood, UA Trace (A) Negative    Protein, UA Trace (A) Negative    Leuk Esterase, UA Negative Negative    Nitrite, UA Negative Negative    Urobilinogen, UA 0.2 E.U./dL 0.2 - 1.0 E.U./dL   Lactic Acid,  Plasma    Collection Time: 03/14/21  6:32 PM    Specimen: Blood   Result Value Ref Range    Lactate 2.3 (C) 0.5 - 2.0 mmol/L   Urinalysis, Microscopic Only - Urine, Clean Catch    Collection Time: 03/14/21  6:32 PM    Specimen: Urine, Clean Catch   Result Value Ref Range    RBC, UA 0-2 None Seen, 0-2 /HPF    WBC, UA 0-2 None Seen, 0-2 /HPF    Bacteria, UA None Seen None Seen /HPF    Squamous Epithelial Cells, UA 0-2 None Seen, 0-2 /HPF    Hyaline Casts, UA None Seen None Seen /LPF    Methodology Manual Light Microscopy        Ordered the above labs and independently reviewed the results.        RADIOLOGY  CT Abdomen Pelvis With Contrast    Result Date: 3/14/2021  CR Patient: JANELLE ROSADO  Time Out: 18:30 Exam(s): CT ABDOMEN + PELVIS With Contrast EXAM:   CT Abdomen and Pelvis With Intravenous Contrast CLINICAL HISTORY:    Reason for exam: abd pain, diarrhea. TECHNIQUE:   Axial computed tomography images of the abdomen and pelvis with intravenous contrast.  CTDI is 28.80 mGy and DLP is 1739.20 mGy-cm.  This CT exam was performed according to the principle of ALARA (As Low As Reasonably Achievable) by using one or more of the following dose reduction techniques: automated exposure control, adjustment of the mA and or kV according to patient size, and or use of iterative reconstruction technique. COMPARISON:   No relevant prior studies available. FINDINGS: The included lower lungs demonstrate numerous air-filled cystic lesions, largest in the inferior right middle lobe measuring 5.3 cm in maximal axial dimension.  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   No other clear sequela such as renal angiomyolipoma or chylous thoracic or abdominal collections.  Consider correlation with serum vascular endothelial growth factor D (VEGF-D).  Langerhans' cell histiocytosis and lymphocytic interstitial pneumonitis are also possible.  There is a 5 mm juxtapleural nodule in the lateral right middle lobe.  Consider  12 month follow-up. The liver, biliary tree, pancreas, spleen, kidneys, and adrenal glands are within normal limits. The appendix is mildly dilated, up to 9 mm in diameter distally.  There is extensive edema about the distal appendix.  Findings are consistent with appendicitis.  Free pelvic fluid with peritoneal thickening may reflect early abscess formation.  Maximal dimensions are 5 x 3.2 cm (transverse by anteroposterior).  There is no free air.  Liquid colonic contents would support provided history of diarrhea. No abdominal aortic aneurysm.  Tiny fatty umbilical hernia.  No acute fracture. IMPRESSION:     Appendicitis.   5 cm deep pelvic fluid collection could reflect early abscess.  No free air. Liquid colonic contents, consistent with provided history of diarrhea. 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  See discussion above. 5 mm RML pulmonary nodule.  Consider 12 month follow-up     Communications: 03 14 21 18:25 Call Doctor Regarding Appendicitis, called  Sean Sanchez PA-C on 03 14 18:25 (-04:00) Electronically signed by Odin Fernandez M.D. on 03-14-21 at 1830      I ordered the above noted radiological studies. Reviewed by me and discussed with radiologist.  See dictation for official radiology interpretation.      MEDICATIONS GIVEN IN ER    Medications   sodium chloride 0.9 % flush 10 mL (has no administration in time range)   sodium chloride 0.9 % bolus 1,000 mL (0 mL Intravenous Stopped 3/14/21 1830)   iopamidol (ISOVUE-300) 61 % injection 100 mL (85 mL Intravenous Given by Other 3/14/21 1747)   piperacillin-tazobactam (ZOSYN) 3.375 g in iso-osmotic dextrose 50 ml (premix) (0 g Intravenous Stopped 3/14/21 1930)   sodium chloride 0.9 % bolus 1,000 mL (1,000 mL Intravenous New Bag 3/14/21 1831)   acetaminophen (TYLENOL) tablet 1,000 mg (1,000 mg Oral Given 3/14/21 1849)   LORazepam (ATIVAN) injection 1 mg (1 mg Intravenous Given 3/14/21 1849)         PROGRESS, DATA  ANALYSIS, CONSULTS, AND MEDICAL DECISION MAKING    All labs have been independently reviewed by me.  All radiology studies have been reviewed by me and discussed with radiologist dictating the report.   EKG's independently viewed and interpreted by me.  Discussion below represents my analysis of pertinent findings related to patient's condition, differential diagnosis, treatment plan and final disposition.    I have discussed case with Dr. Yarbrough, emergency room physician.  He has performed his own bedside examination and agrees with treatment plan.    ED Course as of Mar 14 1949   Sun Mar 14, 2021   1625 Patient presents with several day history of malaise, fatigue, diffuse abdominal pain, one episode of diarrhea.  Differential diagnoses include not limited to bowel obstruction, colitis, appendicitis, diverticulitis, IBS, infectious diarrhea.    [EE]   1716 WBC(!): 19.92 [EE]   1716 Hemoglobin(!): 11.6 [EE]   1827 I discussed CT findings with Dr. Harkins, radiologist.  Patient appears to have appendicitis with a 5 x 3 cm early abscess/fluid collection in the deep pelvis.  In addition there is an atypical appearance to the lungs raising concern for underlying lung issues.  He recommends pulmonary follow-up.    [EE]   1848 I discussed case with Dr. Rodriguez, general surgery.  She agrees to accept the patient.  She will admit the patient and take her to surgery tonight.    [EE]      ED Course User Index  [EE] Sean Sanchez PA       AS OF 19:49 EDT VITALS:    BP - 113/64  HR - (!) 138  TEMP - (!) 103.1 °F (39.5 °C) (Tympanic)  O2 SATS - 92%        DIAGNOSIS  Final diagnoses:   Acute appendicitis with localized peritonitis and abscess, without gangrene, unspecified whether perforation present   Abnormal finding of lung         DISPOSITION  Admitted           Sean Sanchez PA  03/14/21 1949      Electronically signed by Sean Sanchez PA at 03/14/21 1949

## 2021-03-17 PROBLEM — R19.7 DIARRHEA: Status: ACTIVE | Noted: 2021-03-17

## 2021-03-17 PROBLEM — R50.9 FEVER: Status: ACTIVE | Noted: 2021-03-17

## 2021-03-17 PROBLEM — J84.81 LYMPHANGIOLEIOMYOMATOSIS: Status: ACTIVE | Noted: 2021-03-17

## 2021-03-17 PROBLEM — E87.6 HYPOKALEMIA: Status: ACTIVE | Noted: 2021-03-17

## 2021-03-17 LAB
ANION GAP SERPL CALCULATED.3IONS-SCNC: 10.6 MMOL/L (ref 5–15)
BASOPHILS # BLD AUTO: 0.04 10*3/MM3 (ref 0–0.2)
BASOPHILS NFR BLD AUTO: 0.3 % (ref 0–1.5)
BUN SERPL-MCNC: 13 MG/DL (ref 6–20)
BUN/CREAT SERPL: 14.1 (ref 7–25)
CALCIUM SPEC-SCNC: 7.8 MG/DL (ref 8.6–10.5)
CHLORIDE SERPL-SCNC: 108 MMOL/L (ref 98–107)
CO2 SERPL-SCNC: 21.4 MMOL/L (ref 22–29)
CREAT SERPL-MCNC: 0.92 MG/DL (ref 0.57–1)
DEPRECATED RDW RBC AUTO: 38.2 FL (ref 37–54)
EOSINOPHIL # BLD AUTO: 0.03 10*3/MM3 (ref 0–0.4)
EOSINOPHIL NFR BLD AUTO: 0.2 % (ref 0.3–6.2)
ERYTHROCYTE [DISTWIDTH] IN BLOOD BY AUTOMATED COUNT: 12 % (ref 12.3–15.4)
GFR SERPL CREATININE-BSD FRML MDRD: 70 ML/MIN/1.73
GLUCOSE SERPL-MCNC: 72 MG/DL (ref 65–99)
HCT VFR BLD AUTO: 35.3 % (ref 34–46.6)
HGB BLD-MCNC: 11.5 G/DL (ref 12–15.9)
IMM GRANULOCYTES # BLD AUTO: 0.18 10*3/MM3 (ref 0–0.05)
IMM GRANULOCYTES NFR BLD AUTO: 1.3 % (ref 0–0.5)
LYMPHOCYTES # BLD AUTO: 1.73 10*3/MM3 (ref 0.7–3.1)
LYMPHOCYTES NFR BLD AUTO: 12.5 % (ref 19.6–45.3)
MAGNESIUM SERPL-MCNC: 2.1 MG/DL (ref 1.6–2.6)
MCH RBC QN AUTO: 28.8 PG (ref 26.6–33)
MCHC RBC AUTO-ENTMCNC: 32.6 G/DL (ref 31.5–35.7)
MCV RBC AUTO: 88.3 FL (ref 79–97)
MONOCYTES # BLD AUTO: 0.91 10*3/MM3 (ref 0.1–0.9)
MONOCYTES NFR BLD AUTO: 6.6 % (ref 5–12)
NEUTROPHILS NFR BLD AUTO: 10.93 10*3/MM3 (ref 1.7–7)
NEUTROPHILS NFR BLD AUTO: 79.1 % (ref 42.7–76)
NRBC BLD AUTO-RTO: 0 /100 WBC (ref 0–0.2)
PLATELET # BLD AUTO: 439 10*3/MM3 (ref 140–450)
PMV BLD AUTO: 9.9 FL (ref 6–12)
POTASSIUM SERPL-SCNC: 3.4 MMOL/L (ref 3.5–5.2)
POTASSIUM SERPL-SCNC: 3.9 MMOL/L (ref 3.5–5.2)
RBC # BLD AUTO: 4 10*6/MM3 (ref 3.77–5.28)
SODIUM SERPL-SCNC: 140 MMOL/L (ref 136–145)
WBC # BLD AUTO: 13.82 10*3/MM3 (ref 3.4–10.8)

## 2021-03-17 PROCEDURE — 84132 ASSAY OF SERUM POTASSIUM: CPT | Performed by: NURSE PRACTITIONER

## 2021-03-17 PROCEDURE — 25010000002 ONDANSETRON PER 1 MG: Performed by: STUDENT IN AN ORGANIZED HEALTH CARE EDUCATION/TRAINING PROGRAM

## 2021-03-17 PROCEDURE — 85025 COMPLETE CBC W/AUTO DIFF WBC: CPT | Performed by: STUDENT IN AN ORGANIZED HEALTH CARE EDUCATION/TRAINING PROGRAM

## 2021-03-17 PROCEDURE — 25010000002 ENOXAPARIN PER 10 MG: Performed by: STUDENT IN AN ORGANIZED HEALTH CARE EDUCATION/TRAINING PROGRAM

## 2021-03-17 PROCEDURE — 83735 ASSAY OF MAGNESIUM: CPT | Performed by: NURSE PRACTITIONER

## 2021-03-17 PROCEDURE — 25010000002 HYDROMORPHONE PER 4 MG: Performed by: STUDENT IN AN ORGANIZED HEALTH CARE EDUCATION/TRAINING PROGRAM

## 2021-03-17 PROCEDURE — 25010000002 PIPERACILLIN SOD-TAZOBACTAM PER 1 G: Performed by: STUDENT IN AN ORGANIZED HEALTH CARE EDUCATION/TRAINING PROGRAM

## 2021-03-17 PROCEDURE — 80048 BASIC METABOLIC PNL TOTAL CA: CPT | Performed by: STUDENT IN AN ORGANIZED HEALTH CARE EDUCATION/TRAINING PROGRAM

## 2021-03-17 PROCEDURE — 99024 POSTOP FOLLOW-UP VISIT: CPT | Performed by: STUDENT IN AN ORGANIZED HEALTH CARE EDUCATION/TRAINING PROGRAM

## 2021-03-17 RX ORDER — POTASSIUM CHLORIDE 1.5 G/1.77G
40 POWDER, FOR SOLUTION ORAL AS NEEDED
Status: DISCONTINUED | OUTPATIENT
Start: 2021-03-17 | End: 2021-03-22 | Stop reason: HOSPADM

## 2021-03-17 RX ORDER — SACCHAROMYCES BOULARDII 250 MG
500 CAPSULE ORAL 2 TIMES DAILY
Status: DISCONTINUED | OUTPATIENT
Start: 2021-03-17 | End: 2021-03-22 | Stop reason: HOSPADM

## 2021-03-17 RX ORDER — POTASSIUM CHLORIDE 750 MG/1
40 TABLET, FILM COATED, EXTENDED RELEASE ORAL AS NEEDED
Status: DISCONTINUED | OUTPATIENT
Start: 2021-03-17 | End: 2021-03-22 | Stop reason: HOSPADM

## 2021-03-17 RX ORDER — POTASSIUM CHLORIDE 7.45 MG/ML
10 INJECTION INTRAVENOUS
Status: DISCONTINUED | OUTPATIENT
Start: 2021-03-17 | End: 2021-03-22 | Stop reason: HOSPADM

## 2021-03-17 RX ORDER — SODIUM CHLORIDE 9 MG/ML
60 INJECTION, SOLUTION INTRAVENOUS CONTINUOUS
Status: DISCONTINUED | OUTPATIENT
Start: 2021-03-17 | End: 2021-03-22 | Stop reason: HOSPADM

## 2021-03-17 RX ORDER — HYDROCODONE BITARTRATE AND ACETAMINOPHEN 5; 325 MG/1; MG/1
1 TABLET ORAL EVERY 6 HOURS PRN
Status: DISCONTINUED | OUTPATIENT
Start: 2021-03-17 | End: 2021-03-18

## 2021-03-17 RX ADMIN — TAZOBACTAM SODIUM AND PIPERACILLIN SODIUM 3.38 G: 375; 3 INJECTION, SOLUTION INTRAVENOUS at 08:47

## 2021-03-17 RX ADMIN — SODIUM CHLORIDE, PRESERVATIVE FREE 10 ML: 5 INJECTION INTRAVENOUS at 21:25

## 2021-03-17 RX ADMIN — ACETAMINOPHEN 1000 MG: 500 TABLET, FILM COATED ORAL at 23:56

## 2021-03-17 RX ADMIN — ONDANSETRON 4 MG: 2 INJECTION INTRAMUSCULAR; INTRAVENOUS at 08:47

## 2021-03-17 RX ADMIN — HYDROMORPHONE HYDROCHLORIDE 0.5 MG: 1 INJECTION, SOLUTION INTRAMUSCULAR; INTRAVENOUS; SUBCUTANEOUS at 04:58

## 2021-03-17 RX ADMIN — POTASSIUM CHLORIDE 40 MEQ: 750 TABLET, EXTENDED RELEASE ORAL at 16:29

## 2021-03-17 RX ADMIN — POTASSIUM CHLORIDE 40 MEQ: 750 TABLET, EXTENDED RELEASE ORAL at 12:20

## 2021-03-17 RX ADMIN — SODIUM CHLORIDE, PRESERVATIVE FREE 10 ML: 5 INJECTION INTRAVENOUS at 08:47

## 2021-03-17 RX ADMIN — IBUPROFEN 800 MG: 800 TABLET, FILM COATED ORAL at 16:28

## 2021-03-17 RX ADMIN — ACETAMINOPHEN 1000 MG: 500 TABLET, FILM COATED ORAL at 16:28

## 2021-03-17 RX ADMIN — SODIUM CHLORIDE 100 ML/HR: 9 INJECTION, SOLUTION INTRAVENOUS at 10:10

## 2021-03-17 RX ADMIN — Medication 500 MG: at 14:10

## 2021-03-17 RX ADMIN — TAZOBACTAM SODIUM AND PIPERACILLIN SODIUM 3.38 G: 375; 3 INJECTION, SOLUTION INTRAVENOUS at 16:29

## 2021-03-17 RX ADMIN — ACETAMINOPHEN 1000 MG: 500 TABLET, FILM COATED ORAL at 00:45

## 2021-03-17 RX ADMIN — ENOXAPARIN SODIUM 40 MG: 40 INJECTION SUBCUTANEOUS at 21:24

## 2021-03-17 RX ADMIN — IBUPROFEN 800 MG: 800 TABLET, FILM COATED ORAL at 08:47

## 2021-03-17 RX ADMIN — ACETAMINOPHEN 1000 MG: 500 TABLET, FILM COATED ORAL at 08:47

## 2021-03-17 RX ADMIN — Medication 500 MG: at 21:24

## 2021-03-17 RX ADMIN — IBUPROFEN 800 MG: 800 TABLET, FILM COATED ORAL at 21:24

## 2021-03-17 RX ADMIN — TAZOBACTAM SODIUM AND PIPERACILLIN SODIUM 3.38 G: 375; 3 INJECTION, SOLUTION INTRAVENOUS at 23:56

## 2021-03-17 RX ADMIN — TAZOBACTAM SODIUM AND PIPERACILLIN SODIUM 3.38 G: 375; 3 INJECTION, SOLUTION INTRAVENOUS at 00:44

## 2021-03-17 NOTE — PLAN OF CARE
Problem: Adult Inpatient Plan of Care  Goal: Plan of Care Review  Outcome: Ongoing, Progressing  Flowsheets (Taken 3/17/2021 1747)  Progress: no change  Plan of Care Reviewed With:   patient   mother  Outcome Summary: Pt remains A/Ox4, VSS on RA. Temp elevated this morning, 100.9 and HR tachy this morning 120s-140s when OOB. MD Rodriguez made aware. IVF restarted at 100ml/hr. HR down to 100-120s this afternoon. Lungs clear. Abd tender and bloated. Laps with skin glue TRICE. KERI drain to LLQ with serous output. +void, +diarrhea. Probiotic started. OOB indep, steady gait. Encouraged OOB, IS use and ambulation to reduce fevers and improve pain. Pt verbalizes understanding but did not comply as well as yesterday. Receiving ATC tylenol and ibuprofen. Refusing hydrocodone at this time but aware that med is available if needed. Will continue to monitor.  Goal: Patient-Specific Goal (Individualized)  Outcome: Ongoing, Progressing  Goal: Absence of Hospital-Acquired Illness or Injury  Outcome: Ongoing, Progressing  Intervention: Identify and Manage Fall Risk  Recent Flowsheet Documentation  Taken 3/17/2021 1624 by Wendie Schmitz, RN  Safety Promotion/Fall Prevention:   assistive device/personal items within reach   room organization consistent   safety round/check completed  Taken 3/17/2021 1400 by Wendie Schmitz, RN  Safety Promotion/Fall Prevention:   assistive device/personal items within reach   nonskid shoes/slippers when out of bed   room organization consistent   safety round/check completed  Taken 3/17/2021 1221 by Wendie Schmitz, RN  Safety Promotion/Fall Prevention:   assistive device/personal items within reach   nonskid shoes/slippers when out of bed   room organization consistent   safety round/check completed  Taken 3/17/2021 1004 by Wendie Schmitz, RN  Safety Promotion/Fall Prevention:   assistive device/personal items within reach   nonskid shoes/slippers when out of bed   room organization consistent    safety round/check completed  Taken 3/17/2021 0852 by Wendie Schmitz RN  Safety Promotion/Fall Prevention:   assistive device/personal items within reach   nonskid shoes/slippers when out of bed   room organization consistent   safety round/check completed  Intervention: Prevent Skin Injury  Recent Flowsheet Documentation  Taken 3/17/2021 1624 by Wendie Schmitz RN  Body Position: position changed independently  Taken 3/17/2021 1400 by Wendie Schmitz RN  Body Position: position changed independently  Taken 3/17/2021 1221 by Wendie Schmitz RN  Body Position: (chair) --  Taken 3/17/2021 1004 by Wendie Schmitz RN  Body Position: position changed independently  Taken 3/17/2021 0852 by Wendie Schmitz RN  Body Position:   position changed independently   sitting up in bed  Intervention: Prevent and Manage VTE (venous thromboembolism) Risk  Recent Flowsheet Documentation  Taken 3/17/2021 0852 by Wendie Schmitz RN  VTE Prevention/Management:   bilateral   dorsiflexion/plantar flexion performed   sequential compression devices off  Intervention: Prevent Infection  Recent Flowsheet Documentation  Taken 3/17/2021 1624 by Wendie Schmitz RN  Infection Prevention:   single patient room provided   rest/sleep promoted  Taken 3/17/2021 1400 by Wendie Schmitz RN  Infection Prevention:   rest/sleep promoted   single patient room provided  Taken 3/17/2021 1221 by Wendie Schmitz RN  Infection Prevention:   hand hygiene promoted   personal protective equipment utilized   rest/sleep promoted  Taken 3/17/2021 1004 by Wendie Schmitz RN  Infection Prevention:   hand hygiene promoted   rest/sleep promoted   single patient room provided  Taken 3/17/2021 0852 by Wendie Schmitz RN  Infection Prevention:   hand hygiene promoted   rest/sleep promoted   single patient room provided  Goal: Optimal Comfort and Wellbeing  Outcome: Ongoing, Progressing  Intervention: Provide Person-Centered Care  Recent Flowsheet  Documentation  Taken 3/17/2021 0852 by Wendie Schmitz RN  Trust Relationship/Rapport:   care explained   choices provided  Goal: Readiness for Transition of Care  Outcome: Ongoing, Progressing     Problem: Adjustment to Illness (Sepsis/Septic Shock)  Goal: Optimal Coping  Outcome: Ongoing, Progressing  Intervention: Optimize Psychosocial Adjustment to Illness  Recent Flowsheet Documentation  Taken 3/17/2021 0852 by Wendie Schmitz RN  Supportive Measures:   active listening utilized   decision-making supported   goal setting facilitated     Problem: Bleeding (Sepsis/Septic Shock)  Goal: Absence of Bleeding  Outcome: Ongoing, Progressing     Problem: Glycemic Control Impaired (Sepsis/Septic Shock)  Goal: Blood Glucose Level Within Desired Range  Outcome: Ongoing, Progressing  Intervention: Optimize Glycemic Control  Recent Flowsheet Documentation  Taken 3/17/2021 0852 by Wendie Schmitz RN  Glycemic Management: oral hydration promoted     Problem: Hemodynamic Instability (Sepsis/Septic Shock)  Goal: Effective Tissue Perfusion  Outcome: Ongoing, Progressing  Intervention: Optimize Blood Flow  Recent Flowsheet Documentation  Taken 3/17/2021 0852 by Wendie Schmitz RN  Fluid/Electrolyte Management: fluids provided     Problem: Infection (Sepsis/Septic Shock)  Goal: Absence of Infection Signs and Symptoms  Outcome: Ongoing, Progressing  Intervention: Prevent or Manage Infection Progression  Recent Flowsheet Documentation  Taken 3/17/2021 1624 by Wendie Schmitz RN  Infection Prevention:   single patient room provided   rest/sleep promoted  Taken 3/17/2021 1400 by Wendie Schmitz RN  Infection Prevention:   rest/sleep promoted   single patient room provided  Taken 3/17/2021 1221 by Wendie Schmitz RN  Infection Prevention:   hand hygiene promoted   personal protective equipment utilized   rest/sleep promoted  Taken 3/17/2021 1004 by Wendie Schmitz RN  Infection Prevention:   hand hygiene promoted    rest/sleep promoted   single patient room provided  Taken 3/17/2021 0852 by Wendie Schmitz RN  Fever Reduction/Comfort Measures: (incentive spirometer use)   cool cloth applied   fluid intake increased   other (see comments)  Infection Prevention:   hand hygiene promoted   rest/sleep promoted   single patient room provided     Problem: Nutrition Impaired (Sepsis/Septic Shock)  Goal: Optimal Nutrition Intake  Outcome: Ongoing, Progressing     Problem: Respiratory Compromise (Sepsis/Septic Shock)  Goal: Effective Oxygenation and Ventilation  Outcome: Ongoing, Progressing  Intervention: Promote Airway Secretion Clearance  Recent Flowsheet Documentation  Taken 3/17/2021 1624 by Wendie Schmitz RN  Activity Management:   activity adjusted per tolerance   activity encouraged   up ad romy  Taken 3/17/2021 1400 by Wendie Schmitz RN  Activity Management:   activity encouraged   up ad romy  Taken 3/17/2021 1221 by Wendie Schmitz RN  Activity Management: up ad romy  Taken 3/17/2021 1004 by Wendie Schmitz RN  Activity Management: up ad romy  Taken 3/17/2021 0852 by Wendie Schmitz RN  Activity Management: up ad romy  Breathing Techniques/Airway Clearance: deep/controlled cough encouraged  Cough And Deep Breathing: done independently per patient  Intervention: Optimize Oxygenation and Ventilation  Recent Flowsheet Documentation  Taken 3/17/2021 1624 by Wendie Schmitz RN  Head of Bed (HOB): HOB at 30-45 degrees  Taken 3/17/2021 1400 by Wendie Schmitz RN  Head of Bed (HOB): HOB at 30-45 degrees  Taken 3/17/2021 1221 by Wendie Schmitz RN  Head of Bed (HOB): HOB at 60 degrees  Taken 3/17/2021 1004 by Wendie Schmitz RN  Head of Bed (HOB): HOB at 30-45 degrees  Taken 3/17/2021 0852 by Wendie Schmitz RN  Head of Bed (HOB): HOB at 30-45 degrees     Problem: Bleeding (Appendectomy)  Goal: Absence of Bleeding  Outcome: Ongoing, Progressing     Problem: Bowel Elimination Impaired (Appendectomy)  Goal: Effective  Bowel Elimination  Outcome: Ongoing, Progressing     Problem: Infection (Appendectomy)  Goal: Absence of Infection Signs and Symptoms  Outcome: Ongoing, Progressing  Intervention: Prevent or Manage Infection  Recent Flowsheet Documentation  Taken 3/17/2021 0852 by Wendie Schmitz RN  Fever Reduction/Comfort Measures: (incentive spirometer use)   cool cloth applied   fluid intake increased   other (see comments)     Problem: Ongoing Anesthesia Effects (Appendectomy)  Goal: Anesthesia/Sedation Recovery  Outcome: Ongoing, Progressing  Intervention: Optimize Anesthesia Recovery  Recent Flowsheet Documentation  Taken 3/17/2021 1624 by Wendie Schmitz RN  Safety Promotion/Fall Prevention:   assistive device/personal items within reach   room organization consistent   safety round/check completed  Taken 3/17/2021 1600 by Wendie Schmitz RN  Patient Tolerance (IS): good  Administration (IS): self-administered  Number of Repetitions (IS): 5  Taken 3/17/2021 1400 by Wendie Schmitz RN  Patient Tolerance (IS): good  Safety Promotion/Fall Prevention:   assistive device/personal items within reach   nonskid shoes/slippers when out of bed   room organization consistent   safety round/check completed  Administration (IS): self-administered  Taken 3/17/2021 1221 by Wendie Schmitz RN  Safety Promotion/Fall Prevention:   assistive device/personal items within reach   nonskid shoes/slippers when out of bed   room organization consistent   safety round/check completed  Taken 3/17/2021 1200 by Wendie Schmitz RN  Patient Tolerance (IS): good  Administration (IS): self-administered  Taken 3/17/2021 1004 by Wendie Schmitz RN  Safety Promotion/Fall Prevention:   assistive device/personal items within reach   nonskid shoes/slippers when out of bed   room organization consistent   safety round/check completed  Taken 3/17/2021 1000 by Wendie Schmitz RN  Patient Tolerance (IS):   good   no adverse signs/symptoms  present  Administration (IS): self-administered  Taken 3/17/2021 0852 by Wendie Schmitz RN  Patient Tolerance (IS): no adverse signs/symptoms present  Safety Promotion/Fall Prevention:   assistive device/personal items within reach   nonskid shoes/slippers when out of bed   room organization consistent   safety round/check completed  Administration (IS):   self-administered   instruction provided, follow-up   proper technique demonstrated  Taken 3/17/2021 0800 by Wendie Schmitz RN  Patient Tolerance (IS):   good   no adverse signs/symptoms present  Administration (IS): self-administered     Problem: Pain (Appendectomy)  Goal: Acceptable Pain Control  Outcome: Ongoing, Progressing  Intervention: Prevent or Manage Pain  Recent Flowsheet Documentation  Taken 3/17/2021 1624 by Wendie Schmitz RN  Pain Management Interventions: see MAR  Taken 3/17/2021 0852 by Wendie Schmitz RN  Pain Management Interventions: see MAR  Diversional Activities: television  Taken 3/17/2021 0728 by Wendie Schmitz RN  Pain Management Interventions: see MAR     Problem: Postoperative Nausea and Vomiting (Appendectomy)  Goal: Nausea and Vomiting Relief  Outcome: Ongoing, Progressing     Problem: Postoperative Urinary Retention (Appendectomy)  Goal: Effective Urinary Elimination  Outcome: Ongoing, Progressing   Goal Outcome Evaluation:  Plan of Care Reviewed With: patient, mother  Progress: no change  Outcome Summary: Pt remains A/Ox4, VSS on RA. Temp elevated this morning, 100.9 and HR tachy this morning 120s-140s when OOB. MD Rodriguez made aware. IVF restarted at 100ml/hr. HR down to 100-120s this afternoon. Lungs clear. Abd tender and bloated. Laps with skin glue TRICE. KERI drain to LLQ with serous output. +void, +diarrhea. Probiotic started. OOB indep, steady gait. Encouraged OOB, IS use and ambulation to reduce fevers and improve pain. Pt verbalizes understanding but did not comply as well as yesterday. Receiving ATC tylenol and  ibuprofen. Refusing hydrocodone at this time but aware that med is available if needed. Will continue to monitor.

## 2021-03-17 NOTE — PLAN OF CARE
Problem: Adult Inpatient Plan of Care  Goal: Plan of Care Review  3/17/2021 0635 by Mallika Harris, RN  Flowsheets  Taken 3/17/2021 0635 by Mallika Harris, RN  Outcome Summary: Slept well most of shift. c/o pain once after ambulating to bathroom, dilaudid given. KERI drain remains in place and patent, output is good, see flowsheet. She is having frequent diarrhea. wctm.  Taken 3/17/2021 0634 by Mallika Harris, RN  Progress: improving  Taken 3/16/2021 0735 by Missy Del Rio, RN  Plan of Care Reviewed With: patient  3/17/2021 0635 by Mallika Harris, RN  Outcome: Ongoing, Progressing

## 2021-03-17 NOTE — CONSULTS
Patient Name:  Libby Huerta  YOB: 1985  MRN:  [id number]  Date of Admission:  3/14/2021  Date of Consult:  3/17/2021  Patient Care Team:  Kristan Langford APRN as PCP - General (Internal Medicine)    Consults  Subjective   Abdominal Pain  Associated symptoms include diarrhea and nausea. Pertinent negatives include no dysuria, fever, headaches or vomiting.   Diarrhea   Associated symptoms include abdominal pain. Pertinent negatives include no chills, coughing, fever, headaches or vomiting.     Ms. Huerta is a 35 y.o. female without significant medical history that has been admitted to [facility] following  an elective laparoscopic APPENDECTOMY.  She has been admitted to a telemetry floor following surgery and we were asked to see and assist with her medical problems.  At the time of my visit she denies any chest pain, SOA at rest, vomiting or diarrhea.  She has tolerated a clear liquid diet.  She does complain of expected postoperative discomfort, especially with movement.  She reports being in a normal state of health leading up to surgery. Since surgery she has complaints of shortness of breath on exertion, polydipsia and blood sugars have been elevated, otherwise she has remained stable.     Past Medical History:   Diagnosis Date   • Anxiety    • Asthma     childhood asthma     Past Surgical History:   Procedure Laterality Date   • APPENDECTOMY N/A 3/14/2021    Procedure: APPENDECTOMY LAPAROSCOPIC;  Surgeon: Char Rodriguez MD;  Location: [facility];  Service: General;  Laterality: N/A;   • LASIK     • TONSILLECTOMY       Family History   Problem Relation Age of Onset   • Celiac disease Mother    • Cancer Mother    • Cervical cancer Mother    • Thyroid disease Maternal Grandmother    • Rheum arthritis Maternal Grandmother    • Crohn's disease Maternal Grandfather      Social History     Tobacco Use   • Smoking status: Former Smoker   • Smokeless tobacco: Never Used      Substance Use Topics   • Alcohol use: Yes     Alcohol/week: 1.0 standard drinks     Types: 1 Glasses of wine per week     Comment: rare   • Drug use: No     No medications prior to admission.     Allergies:  Ceclor [cefaclor]    Review of Systems   Constitutional: Negative for chills and fever.   HENT: Negative for congestion and sore throat.    Eyes: Negative for discharge and itching.   Respiratory: Negative for cough and shortness of breath.    Cardiovascular: Negative for chest pain and palpitations.   Gastrointestinal: Positive for abdominal pain, diarrhea and nausea. Negative for vomiting.   Endocrine: Positive for polydipsia. Negative for cold intolerance and heat intolerance.   Genitourinary: Negative for difficulty urinating and dysuria.   Musculoskeletal: Negative for back pain and gait problem.   Skin: Positive for wound. Negative for color change.   Allergic/Immunologic: Negative for environmental allergies and food allergies.   Neurological: Negative for dizziness and headaches.   Hematological: Negative for adenopathy. Does not bruise/bleed easily.   Psychiatric/Behavioral: Negative for agitation and behavioral problems.       Objective      Vital Signs  Temp:  [98 °F (36.7 °C)-100.9 °F (38.3 °C)] 98.2 °F (36.8 °C)  Heart Rate:  [] 107  Resp:  [18] 18  BP: (128-157)/(76-98) 138/87  Body mass index is 38.44 kg/m².    Physical Exam  Vitals and nursing note reviewed.   Constitutional:       Appearance: Normal appearance.   HENT:      Head: Normocephalic and atraumatic.   Eyes:      Extraocular Movements: Extraocular movements intact.      Conjunctiva/sclera: Conjunctivae normal.   Cardiovascular:      Rate and Rhythm: Normal rate and regular rhythm.   Pulmonary:      Effort: Pulmonary effort is normal. No respiratory distress.      Breath sounds: Normal breath sounds.   Abdominal:      General: Bowel sounds are normal. There is no distension.      Palpations: Abdomen is soft.      Tenderness:  There is abdominal tenderness.   Musculoskeletal:         General: No swelling. Normal range of motion.      Cervical back: Normal range of motion and neck supple.   Skin:     General: Skin is warm and dry.      Comments: KERI drain LLQ, lap sites CDI, open to air   Neurological:      Mental Status: She is alert and oriented to person, place, and time. Mental status is at baseline.   Psychiatric:         Mood and Affect: Mood normal.         Behavior: Behavior normal.         Results Review:   I reviewed the patient's new clinical results.  I reviewed the patient's new imaging results and agree with the interpretation.  I reviewed the patient's other test results and agree with the interpretation  I personally viewed and interpreted the patient's EKG/Telemetry data         Assessment/Plan     Active Hospital Problems    Diagnosis  POA   • Obesity (BMI 30-39.9) [E66.9]  Yes   • Leukocytosis [D72.829]  Yes   • Elevated random blood glucose level [R73.09]  Yes   • Postoperative hypoxia [R09.02, Z98.890]  Unknown   • Acute appendicitis with localized peritonitis and abscess, without gangrene [K35.33]  Yes       Ms. Huerta is a 35 y.o. female who is s/p APPENDECTOMY LAPAROSCOPIC.    · doing well postoperatively. teixeira catheter removed this afternoon. bladder scan as needed and if >350mL, I&O cath. continue supportive care with IVFs, analgesics and antiemetics. encourage IS.  Monitor labs, leukocytosis improving, continue Zosyn. Wean o2 as tolerated. tachycardia has improved.  · check a1c as glucose has been running high without a history of DM2 and with complaints of polydipsia.       Thank you very much for asking A to be involved in this patient's care. We will follow along with you.      JOSE Cao  [facility]  03/17/21  11:21 EDT

## 2021-03-17 NOTE — PROGRESS NOTES
Name: Libby Huerta ADMIT: 3/14/2021   : 1985  PCP: Kristan Langford APRN    MRN: [id number] LOS: 3 days   AGE/SEX: 35 y.o. female  ROOM: [facility]     Subjective   Subjective   complains of diarrhea post op. has been on room air. more tachycardic today and still with some shortness of breath with exertion. febrile this am but denies chills.     Review of Systems   Constitutional: Negative for chills and fever.   Respiratory: Positive for shortness of breath. Negative for chest tightness.    Cardiovascular: Negative for chest pain and palpitations.   Gastrointestinal: Positive for abdominal pain and diarrhea. Negative for constipation.   Genitourinary: Negative for difficulty urinating and dysuria.   Musculoskeletal: Positive for gait problem. Negative for back pain.   Psychiatric/Behavioral: Negative for agitation and behavioral problems.        Objective   Objective   Vital Signs  Temp:  [98 °F (36.7 °C)-100.9 °F (38.3 °C)] 98.2 °F (36.8 °C)  Heart Rate:  [] 107  Resp:  [18] 18  BP: (128-157)/(76-98) 138/87  SpO2:  [93 %-95 %] 93 %  on   ;   Device (Oxygen Therapy): room air  Body mass index is 38.44 kg/m².  Physical Exam  Vitals and nursing note reviewed.   Constitutional:       Appearance: Normal appearance. She is obese.   Cardiovascular:      Rate and Rhythm: Normal rate and regular rhythm.   Pulmonary:      Effort: Pulmonary effort is normal. No respiratory distress.      Breath sounds: Normal breath sounds.   Abdominal:      General: Bowel sounds are normal. There is no distension.      Tenderness: There is abdominal tenderness.   Skin:     General: Skin is warm and dry.      Comments: KERI drain LLQ   Neurological:      Mental Status: She is alert and oriented to person, place, and time. Mental status is at baseline.         Results Review     I reviewed the patient's new clinical results.  Results from last 7 days   Lab Units 21  0455 21  0424 03/15/21  0439 21  1654   WBC  10*3/mm3 13.82* 19.01* 17.37* 19.92*   HEMOGLOBIN g/dL 11.5* 10.4* 10.8* 11.6*   PLATELETS 10*3/mm3 439 360 319 397     Results from last 7 days   Lab Units 03/17/21  0455 03/16/21  0424 03/15/21  0439 03/14/21  1654   SODIUM mmol/L 140 136 136 134*   POTASSIUM mmol/L 3.4* 4.0 4.4 4.1   CHLORIDE mmol/L 108* 106 108* 99   CO2 mmol/L 21.4* 22.7 19.5* 23.8   BUN mg/dL 13 17 11 13   CREATININE mg/dL 0.92 0.82 0.74 1.11*   GLUCOSE mg/dL 72 100* 135* 112*   Estimated Creatinine Clearance: 104.6 mL/min (by C-G formula based on SCr of 0.92 mg/dL).  Results from last 7 days   Lab Units 03/14/21  1654   ALBUMIN g/dL 3.30*   BILIRUBIN mg/dL 0.9   ALK PHOS U/L 88   AST (SGOT) U/L 10   ALT (SGPT) U/L 26     Results from last 7 days   Lab Units 03/17/21  0455 03/16/21  0424 03/15/21  0439 03/14/21  1654   CALCIUM mg/dL 7.8* 8.3* 7.9* 8.9   ALBUMIN g/dL  --   --   --  3.30*     Results from last 7 days   Lab Units 03/14/21  2358 03/14/21  1832 03/14/21  1654   PROCALCITONIN ng/mL  --   --  8.96*   LACTATE mmol/L 0.8 2.3*  --      COVID19   Date Value Ref Range Status   03/14/2021 Not Detected Not Detected - Ref. Range Final     Hemoglobin A1C   Date/Time Value Ref Range Status   03/15/2021 1854 5.20 4.80 - 5.60 % Final       CT Chest Without Contrast Diagnostic  Narrative: CT CHEST WO CONTRAST DIAGNOSTIC-     CLINICAL HISTORY: Follow-up abnormal lung parenchyma partially seen on  CT scan of the abdomen and pelvis     TECHNIQUE: Spiral CT images were obtained through the chest without IV  contrast and were reconstructed in 3 mm thick axial slices.     Radiation dose reduction techniques were utilized, including automated  exposure control and exposure modulation based on body size.     COMPARISON: CT scan of the abdomen and pelvis dated 03/14/2021     FINDINGS: There are numerous well marginated air-containing cysts of  varying size scattered throughout both lungs. The findings suggest the  possibility of lymphangioleiomyomatosis.  No discrete lung masses are  identified. There are curvilinear bands of abnormal increased density  with adjacent pleural thickening located in the posterior aspects of the  right upper and lower lobes and also within the left lower lobe. Focal  pleural-based areas of consolidation are also noted in the posterior  aspects of both lower lobes that are new since the preceding abdomen CT.  These are likely areas of atelectasis. Tiny bilateral pleural effusions  are also new. There is no mediastinal or hilar or axillary  lymphadenopathy. The heart is normal in size. Images through the upper  abdomen show no abnormalities.     Impression: Multiple air containing cysts of varying size scattered  throughout both lungs raising the possibility of  lymphangioleiomyomatosis. No discrete lung masses are identified. There  are curvilinear areas of abnormal increased density scattered throughout  both lungs along with pleural-based areas of consolidation there  consistent with atelectasis. Tiny bilateral posteriorly layering pleural  effusions are present, and are new since a CT scan of the abdomen dated  03/14/2021.     This report was finalized on 3/16/2021 1:54 PM by Dr. Zurdo Perez M.D.       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       continue supportive care with analgesics and antiemetics. encourage IS.  Monitor labs, leukocytosis better today, continue Zosyn. Wean o2 as tolerated- has been on RA. IVFs resumed given tachycardia and may need re-imaging in the next day or 2 to assess for abscess.   HRCT chest ordered given abnormal abd CT findings and demonstrates possible lymphangioleiomyomatosis, pulmonology following and will manage.  check a1c as glucose has been running high without a history of DM2. A1c 5.2 discussed lifestyle modifications.    add probiotic and stop miralax to see if this will help with her diarrhea but will need to avoid constipation given narcotic use and recent abdominal surgery.  replete potassium and check mg         JOSE Cao  [facility]  03/17/21  11:53 EDT

## 2021-03-17 NOTE — PROGRESS NOTES
"General Surgery Progress Note    Summary:  Mrs. Libby Huerta is a 35 y.o. year old lady POD3 s/p lap appendectomy for ruptured appendicitis. Febrile this morning, WBC 13 from 19, on zosyn, continue to monitor. On a regular diet, on bowel regimen. Tachycardic, restart IVF. May need CT abdomen/pelvis in next 1-2 days to eval for abscess. Lovenox. Seen by pulmonary for CT chest findings.     Interval Events: No acute events overnight. Tmax 100.9 this am. Feeling nauseated. Having diarrhea. Beginning to walk around the room.     Vitals: /76 (BP Location: Left arm, Patient Position: Lying)   Pulse 97   Temp 98.7 °F (37.1 °C) (Oral)   Resp 18   Ht 165.1 cm (65\")   Wt 105 kg (231 lb)   SpO2 95%   BMI 38.44 kg/m²     Drain 285cc    GENERAL: alert, well appearing, and in no distress  HEENT: normocephalic, atraumatic, moist mucous membranes, clear sclerae   CHEST: on 1L NC, no increased work of breathing, symmetric air entry  CARDIAC: regular rate and rhythm    ABDOMEN: Soft, mildly distended, appropriately tender to palpation, KERI with some purulent drainage  EXTREMITIES: no cyanosis, clubbing, or edema   SKIN: Warm and moist, no rashes    Labs:  Results from last 7 days   Lab Units 03/17/21  0455 03/16/21  0424 03/15/21  0439   WBC 10*3/mm3 13.82* 19.01* 17.37*   HEMOGLOBIN g/dL 11.5* 10.4* 10.8*   HEMATOCRIT % 35.3 31.0* 33.0*   PLATELETS 10*3/mm3 439 360 319   MONOCYTES % %  --   --  3.0*     Results from last 7 days   Lab Units 03/17/21  0455 03/16/21  0424 03/15/21  0439 03/14/21  1654   SODIUM mmol/L 140 136 136 134*   POTASSIUM mmol/L 3.4* 4.0 4.4 4.1   CHLORIDE mmol/L 108* 106 108* 99   CO2 mmol/L 21.4* 22.7 19.5* 23.8   BUN mg/dL 13 17 11 13   CREATININE mg/dL 0.92 0.82 0.74 1.11*   CALCIUM mg/dL 7.8* 8.3* 7.9* 8.9   BILIRUBIN mg/dL  --   --   --  0.9   ALK PHOS U/L  --   --   --  88   ALT (SGPT) U/L  --   --   --  26   AST (SGOT) U/L  --   --   --  10   GLUCOSE mg/dL 72 100* 135* 112*       "     REYNA LIMON MD  General and Endoscopic Surgery  [facility]    [address], [address]  [place], [place], [address]  P: [phone]  F: [phone]

## 2021-03-17 NOTE — PAYOR COMM NOTE
"Janelle Huerta (35 y.o. Female)     ATTN: INITIAL CLINICALS REFAXED FOR INPATIENT ADMIT:  [id number]    RECEIVED CORRECT ID # TODAY, SPOKE WITH ONSITE RN REQUESTED TO REFAX    PLEASE REPLY TO UR DEPT: YAHIR QUICK RN,[facility]; [phone],  [phone]        Date of Birth Social Security Number Address Home Phone MRN    [id number]  [id number] [id number] [phone] [id number]    [place] Marital Status          Unknown Single       Admission Date Admission Type Admitting Provider Attending Provider Department, Room/Bed    3/14/21 Emergency Char Rodriguez MD Arnold, Lindsay, MD [address], S414/1    Discharge Date Discharge Disposition Discharge Destination                       Attending Provider: Char Rodriguez MD    Allergies: Ceclor [Cefaclor]    Isolation: None   Infection: None   Code Status: CPR    Ht: 165.1 cm (65\")   Wt: 105 kg (231 lb)    Admission Cmt: None   Principal Problem: None                Active Insurance as of 3/14/2021     Primary Coverage     Payor Plan Insurance Group Employer/Plan Group    [facility] COMMUNITY PLAN [facility] COMMUNITY PLAN [facility]      Payor Plan Address Payor Plan Phone Number Payor Plan Fax Number Effective Dates    [address]   3/14/2021 - None Entered    [facility] [phone]       Subscriber Name Subscriber Birth Date Member ID       JANELLE HUERTA 1985 [id number]                 Emergency Contacts      (Rel.) Home Phone Work Phone Mobile Phone    Bar Self (Significant Other) [phone] -- --    Vito Maia (Mother) -- -- [phone]               History & Physical      Char Rodriguez MD at 03/14/21 2016          General Surgery history and physical exam    Summary:    Mrs. Janelle Huerta is a 35 y.o. year old lady with likely ruptured appendicitis with sepsis.  She is tachycardic to the 140s and has a temperature 103.5.  We will plan on OR tonight for diagnostic laparoscopy, likely " "appendectomy.we did discuss in detail the possibility of being unable to identify the appendix and performing a washout and leaving a drain versus an exploratory laparotomy and bowel resection.  All risks (including bleeding, infection, damage to surrounding structures, anastomotic leak), benefits, and alternatives were explained to the patient and her boyfriend and she agreed and wished to proceed.    Chief Complaint:    Abdominal pain    History of Present Illness:    Mrs. Libby Huerta is a 35 y.o. year old lady who presents to the ER with a 6-day history of worsening fatigue and abdominal pain.  She states she is unable to go to bed for the past 3 days.  She has had worsening right lower quadrant abdominal pain and began having watery diarrhea.  She denies any prior similar episodes or sick contacts.  She was on her period 2 days ago.    Past Medical History:   • None    Past Surgical History:    • Tonsillectomy    Family History:    • Mother with cervical cancer  • Maternal aunt with Crohn's disease    Social History:    • Denies tobacco use  • Occasional alcohol use    Allergies:   Allergies   Allergen Reactions   • Ceclor [Cefaclor] Shortness Of Breath     Per mom \"trouble breathing\"       Medications:     Current Facility-Administered Medications:   •  [COMPLETED] Insert peripheral IV, , , Once **AND** sodium chloride 0.9 % flush 10 mL, 10 mL, Intravenous, PRN, Sean Sanchez PA  No current outpatient medications on file.    Radiology/Endoscopy:    • CT scan reviewed; likely appendicitis, dense inflammatory reaction and fluid in the right lower quadrant, no free air    Labs:    • White blood cell count 19.9 hemoglobin 11.6 platelets 397 creatinine 1.1 CO2 23    Review of Systems:   Influenza-like illness: no fever, no  cough, no  sore throat, no  body aches, no loss of sense of taste or smell, no known exposure to person with Covid-19.  Constitutional: Negative for fevers or chills  HENT: Negative for " hearing loss or runny nose  Eyes: Negative for vision changes or scleral icterus  Respiratory: Negative for cough or shortness of breath  Cardiovascular: Negative for chest pain or heart palpitations  Gastrointestinal: Positive for abdominal pain; negative for nausea, vomiting, constipation, melena, or hematochezia  Genitourinary: Negative for hematuria or dysuria  Musculoskeletal: Negative for joint swelling or gait instability  Neurologic: Negative for tremors or seizures  Psychiatric: Negative for suicidal ideations or depression  All other systems reviewed and negative    Physical Exam:   • Constitutional: Well-developed, well-nourished, appears unwell, diaphoretic  • Vital signs: Temperature 103.1 heart rate 134 respiratory rate 16 blood pressure 112/49  • Eyes: Conjunctiva normal, sclera nonicteric  • ENMT: Hearing grossly normal, oral mucosa moist  • Neck: Supple, trachea midline  • Respiratory: On 2 L nasal cannula, no increased work of breathing, normal inspiratory effort  • Cardiovascular: Tachycardic, regular rhythm, no jugular venous distention  • Gastrointestinal: Soft, focally peritonitic in the right lower quadrant, minimally distended  • Skin:  Warm, dry, no rash on visualized skin surfaces  • Musculoskeletal: Symmetric strength, normal gait  • Psychiatric: Alert and oriented ×3, normal affect     CHAR LIMON M.D.  General and Endoscopic Surgery  [facility]    [address], [address]  [place], [place], [facility]  P: [phone]  F: [phone]       Electronically signed by Char Limon MD at 03/14/21 2021          Emergency Department Notes      Arcenio Melgoza RN at 03/14/21 1548        Pt reports generalized abd. Pain x 3 days pt states diarrhea started today. denies nausea or vomiting.    Patient masked in first look triage. I was wearing mask and goggles.       Electronically signed by Arcenio Melgoza RN at 03/14/21 0360     Sean Sanchez PA at 03/14/21 0496      Attestation signed by Yuval Yarbrough MD at 03/16/21 1506        MD Attestation Note    I supervised care provided by the midlevel provider.    The PETE and I have discussed this patient's history, physical exam, and treatment plan. I have reviewed the documentation and personally had a face to face interaction with the patient  I affirm the documentation and agree with the treatment and plan.   My personal findings are documented in a separate note.   Yuval Yarbrough MD 3/16/2021 15:06 EDT                   EMERGENCY DEPARTMENT ENCOUNTER    Room Number:  02/02  Date of encounter:  3/14/2021  PCP: Kristan Langford APRN  Historian: Patient      I used full protective equipment while examining this patient.  This includes face mask, gloves and protective eyewear.  I washed my hands before entering the room and immediately upon leaving the room      HPI:  Chief Complaint: Abdominal pain, fatigue  A complete HPI/ROS/PMH/PSH/SH/FH are unobtainable due to: Nothing    Context: Libby Huerta is a 35 y.o. female who presents to the ED c/o 4 to 5-day history of fatigue, malaise, progressive abdominal pain and distention, as well as 1 episode of watery diarrhea today.  Patient denies any known fever, chest pain, shortness of breath, vomiting, dysuria, vaginal discharge.  Patient denies any previous similar symptoms.  She denies any ill contacts.  She describes her abdominal pain is generalized and moderate in intensity.  She denies any precipitating alleviating factors.    Review of Medical Records  No previous pertinent medical records.    PAST MEDICAL HISTORY  Active Ambulatory Problems     Diagnosis Date Noted   • No Active Ambulatory Problems     Resolved Ambulatory Problems     Diagnosis Date Noted   • No Resolved Ambulatory Problems     Past Medical History:   Diagnosis Date   • Anxiety    • Asthma          PAST SURGICAL HISTORY  Past Surgical History:   Procedure Laterality Date   • LASIK     • TONSILLECTOMY            FAMILY HISTORY  Family History   Problem Relation Age of Onset   • Celiac disease Mother    • Cancer Mother    • Cervical cancer Mother    • Thyroid disease Maternal Grandmother    • Rheum arthritis Maternal Grandmother    • Crohn's disease Maternal Grandfather          SOCIAL HISTORY  Social History     Socioeconomic History   • Marital status: Single     Spouse name: Not on file   • Number of children: Not on file   • Years of education: Not on file   • Highest education level: Not on file   Tobacco Use   • Smoking status: Former Smoker   • Smokeless tobacco: Never Used   Substance and Sexual Activity   • Alcohol use: Yes     Alcohol/week: 1.0 standard drinks     Types: 1 Glasses of wine per week     Comment: rare   • Drug use: No   • Sexual activity: Yes     Partners: Male         ALLERGIES  Ceclor [cefaclor]        REVIEW OF SYSTEMS  All systems reviewed and negative except for those discussed in HPI.       PHYSICAL EXAM    I have reviewed the triage vital signs and nursing notes.    ED Triage Vitals [03/14/21 1548]   Temp Heart Rate Resp BP SpO2   (!) 100.9 °F (38.3 °C) (!) 151 18 -- 97 %      Temp src Heart Rate Source Patient Position BP Location FiO2 (%)   Tympanic -- -- -- --       Physical Exam  GENERAL: Alert, oriented, mildly ill-appearing, not distressed  HENT: head atraumatic, no nuchal rigidity  EYES: no scleral icterus, EOMI  CV: regular rhythm, regular rate, no murmur  RESPIRATORY: normal effort, CTA  ABDOMEN: soft, moderate diffuse abdominal tenderness without guarding rebound.  No masses.  MUSCULOSKELETAL: no deformity, FROM, no calf swelling or tenderness  NEURO: alert, moves all extremities, follows commands  SKIN: warm, dry        LAB RESULTS  Recent Results (from the past 24 hour(s))   Comprehensive Metabolic Panel    Collection Time: 03/14/21  4:54 PM    Specimen: Blood   Result Value Ref Range    Glucose 112 (H) 65 - 99 mg/dL    BUN 13 6 - 20 mg/dL    Creatinine 1.11 (H) 0.57 - 1.00  mg/dL    Sodium 134 (L) 136 - 145 mmol/L    Potassium 4.1 3.5 - 5.2 mmol/L    Chloride 99 98 - 107 mmol/L    CO2 23.8 22.0 - 29.0 mmol/L    Calcium 8.9 8.6 - 10.5 mg/dL    Total Protein 7.7 6.0 - 8.5 g/dL    Albumin 3.30 (L) 3.50 - 5.20 g/dL    ALT (SGPT) 26 1 - 33 U/L    AST (SGOT) 10 1 - 32 U/L    Alkaline Phosphatase 88 39 - 117 U/L    Total Bilirubin 0.9 0.0 - 1.2 mg/dL    eGFR Non African Amer 57 (L) >60 mL/min/1.73    Globulin 4.4 gm/dL    A/G Ratio 0.8 g/dL    BUN/Creatinine Ratio 11.7 7.0 - 25.0    Anion Gap 11.2 5.0 - 15.0 mmol/L   Lipase    Collection Time: 03/14/21  4:54 PM    Specimen: Blood   Result Value Ref Range    Lipase 19 13 - 60 U/L   hCG, Serum, Qualitative    Collection Time: 03/14/21  4:54 PM    Specimen: Blood   Result Value Ref Range    HCG Qualitative Negative Negative   CBC Auto Differential    Collection Time: 03/14/21  4:54 PM    Specimen: Blood   Result Value Ref Range    WBC 19.92 (H) 3.40 - 10.80 10*3/mm3    RBC 3.99 3.77 - 5.28 10*6/mm3    Hemoglobin 11.6 (L) 12.0 - 15.9 g/dL    Hematocrit 35.1 34.0 - 46.6 %    MCV 88.0 79.0 - 97.0 fL    MCH 29.1 26.6 - 33.0 pg    MCHC 33.0 31.5 - 35.7 g/dL    RDW 12.0 (L) 12.3 - 15.4 %    RDW-SD 38.3 37.0 - 54.0 fl    MPV 9.6 6.0 - 12.0 fL    Platelets 397 140 - 450 10*3/mm3    Neutrophil % 89.7 (H) 42.7 - 76.0 %    Lymphocyte % 6.2 (L) 19.6 - 45.3 %    Monocyte % 3.1 (L) 5.0 - 12.0 %    Eosinophil % 0.0 (L) 0.3 - 6.2 %    Basophil % 0.4 0.0 - 1.5 %    Immature Grans % 0.6 (H) 0.0 - 0.5 %    Neutrophils, Absolute 17.88 (H) 1.70 - 7.00 10*3/mm3    Lymphocytes, Absolute 1.24 0.70 - 3.10 10*3/mm3    Monocytes, Absolute 0.62 0.10 - 0.90 10*3/mm3    Eosinophils, Absolute 0.00 0.00 - 0.40 10*3/mm3    Basophils, Absolute 0.07 0.00 - 0.20 10*3/mm3    Immature Grans, Absolute 0.11 (H) 0.00 - 0.05 10*3/mm3    nRBC 0.0 0.0 - 0.2 /100 WBC   Procalcitonin    Collection Time: 03/14/21  4:54 PM    Specimen: Blood   Result Value Ref Range    Procalcitonin 8.96 (H)  0.00 - 0.25 ng/mL   Urinalysis With Microscopic If Indicated (No Culture) - Urine, Clean Catch    Collection Time: 03/14/21  6:32 PM    Specimen: Urine, Clean Catch   Result Value Ref Range    Color, UA Yellow Yellow, Straw    Appearance, UA Clear Clear    pH, UA 6.5 5.0 - 8.0    Specific Gravity, UA 1.029 1.005 - 1.030    Glucose, UA Negative Negative    Ketones, UA Negative Negative    Bilirubin, UA Negative Negative    Blood, UA Trace (A) Negative    Protein, UA Trace (A) Negative    Leuk Esterase, UA Negative Negative    Nitrite, UA Negative Negative    Urobilinogen, UA 0.2 E.U./dL 0.2 - 1.0 E.U./dL   Lactic Acid, Plasma    Collection Time: 03/14/21  6:32 PM    Specimen: Blood   Result Value Ref Range    Lactate 2.3 (C) 0.5 - 2.0 mmol/L   Urinalysis, Microscopic Only - Urine, Clean Catch    Collection Time: 03/14/21  6:32 PM    Specimen: Urine, Clean Catch   Result Value Ref Range    RBC, UA 0-2 None Seen, 0-2 /HPF    WBC, UA 0-2 None Seen, 0-2 /HPF    Bacteria, UA None Seen None Seen /HPF    Squamous Epithelial Cells, UA 0-2 None Seen, 0-2 /HPF    Hyaline Casts, UA None Seen None Seen /LPF    Methodology Manual Light Microscopy        Ordered the above labs and independently reviewed the results.        RADIOLOGY  CT Abdomen Pelvis With Contrast    Result Date: 3/14/2021  CR Patient: JANELLE ROSADO  Time Out: 18:30 Exam(s): CT ABDOMEN + PELVIS With Contrast EXAM:   CT Abdomen and Pelvis With Intravenous Contrast CLINICAL HISTORY:    Reason for exam: abd pain, diarrhea. TECHNIQUE:   Axial computed tomography images of the abdomen and pelvis with intravenous contrast.  CTDI is 28.80 mGy and DLP is 1739.20 mGy-cm.  This CT exam was performed according to the principle of ALARA (As Low As Reasonably Achievable) by using one or more of the following dose reduction techniques: automated exposure control, adjustment of the mA and or kV according to patient size, and or use of iterative reconstruction technique.  COMPARISON:   No relevant prior studies available. FINDINGS: The included lower lungs demonstrate numerous air-filled cystic lesions, largest in the inferior right middle lobe measuring 5.3 cm in maximal axial dimension.  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   No other clear sequela such as renal angiomyolipoma or chylous thoracic or abdominal collections.  Consider correlation with serum vascular endothelial growth factor D (VEGF-D).  Langerhans' cell histiocytosis and lymphocytic interstitial pneumonitis are also possible.  There is a 5 mm juxtapleural nodule in the lateral right middle lobe.  Consider 12 month follow-up. The liver, biliary tree, pancreas, spleen, kidneys, and adrenal glands are within normal limits. The appendix is mildly dilated, up to 9 mm in diameter distally.  There is extensive edema about the distal appendix.  Findings are consistent with appendicitis.  Free pelvic fluid with peritoneal thickening may reflect early abscess formation.  Maximal dimensions are 5 x 3.2 cm (transverse by anteroposterior).  There is no free air.  Liquid colonic contents would support provided history of diarrhea. No abdominal aortic aneurysm.  Tiny fatty umbilical hernia.  No acute fracture. IMPRESSION:     Appendicitis.   5 cm deep pelvic fluid collection could reflect early abscess.  No free air. Liquid colonic contents, consistent with provided history of diarrhea. 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  See discussion above. 5 mm RML pulmonary nodule.  Consider 12 month follow-up     Communications: 03 14 21 18:25 Call Doctor Regarding Appendicitis, called  Sean Sanchez PA-C on 03 14 18:25 (-04:00) Electronically signed by Odin Fernandez M.D. on 03-14-21 at 1830      I ordered the above noted radiological studies. Reviewed by me and discussed with radiologist.  See dictation for official radiology interpretation.      MEDICATIONS GIVEN IN ER    Medications    sodium chloride 0.9 % flush 10 mL (has no administration in time range)   sodium chloride 0.9 % bolus 1,000 mL (0 mL Intravenous Stopped 3/14/21 1830)   iopamidol (ISOVUE-300) 61 % injection 100 mL (85 mL Intravenous Given by Other 3/14/21 1747)   piperacillin-tazobactam (ZOSYN) 3.375 g in iso-osmotic dextrose 50 ml (premix) (0 g Intravenous Stopped 3/14/21 1930)   sodium chloride 0.9 % bolus 1,000 mL (1,000 mL Intravenous New Bag 3/14/21 1831)   acetaminophen (TYLENOL) tablet 1,000 mg (1,000 mg Oral Given 3/14/21 1849)   LORazepam (ATIVAN) injection 1 mg (1 mg Intravenous Given 3/14/21 1849)         PROGRESS, DATA ANALYSIS, CONSULTS, AND MEDICAL DECISION MAKING    All labs have been independently reviewed by me.  All radiology studies have been reviewed by me and discussed with radiologist dictating the report.   EKG's independently viewed and interpreted by me.  Discussion below represents my analysis of pertinent findings related to patient's condition, differential diagnosis, treatment plan and final disposition.    I have discussed case with Dr. Yarbrough, emergency room physician.  He has performed his own bedside examination and agrees with treatment plan.    ED Course as of Mar 14 1949   Sun Mar 14, 2021   1625 Patient presents with several day history of malaise, fatigue, diffuse abdominal pain, one episode of diarrhea.  Differential diagnoses include not limited to bowel obstruction, colitis, appendicitis, diverticulitis, IBS, infectious diarrhea.    [EE]   1716 WBC(!): 19.92 [EE]   1716 Hemoglobin(!): 11.6 [EE]   1827 I discussed CT findings with Dr. Harkins, radiologist.  Patient appears to have appendicitis with a 5 x 3 cm early abscess/fluid collection in the deep pelvis.  In addition there is an atypical appearance to the lungs raising concern for underlying lung issues.  He recommends pulmonary follow-up.    [EE]   1848 I discussed case with Dr. Rodriguez, general surgery.  She agrees to accept the patient.   She will admit the patient and take her to surgery tonight.    [EE]      ED Course User Index  [EE] Sean Sanchez PA       AS OF 19:49 EDT VITALS:    BP - 113/64  HR - (!) 138  TEMP - (!) 103.1 °F (39.5 °C) (Tympanic)  O2 SATS - 92%        DIAGNOSIS  Final diagnoses:   Acute appendicitis with localized peritonitis and abscess, without gangrene, unspecified whether perforation present   Abnormal finding of lung         DISPOSITION  Admitted           Sean Sanchez PA  03/14/21 1949      Electronically signed by Yuval Yarbrough MD at 03/16/21 1506     Yuval Yarbrough MD at 03/14/21 1814          Pt presents to the ED c/o  1 week of worsening abdominal pain, diarrhea.  Denies any measured fevers, vomiting, dysuria.  Denies any vaginal discharge.     On exam,   General: Awake, alert, appears uncomfortable and mildly ill-appearing, nondiaphoretic  HEENT: Mucous membranes moist, atraumatic, EOMI  Neck: Full ROM  Pulm: Symmetric chest rise, nonlabored, lungs CTAB  Cardiovascular: Regular rhythm tachycardia, intact distal pulses  GI: Soft, tenderness to palpation in the right lower quadrant, nondistended, no rebound, no guarding, bowel sounds diminished  MSK: Full ROM, no deformity  Skin: Warm, dry  Neuro: Alert and oriented x 3, GCS 15, moving all extremities, no focal deficits  Psych: Calm, cooperative      Surgical mask, protective eye goggles, and gloves used during this encounter. Patient in surgical mask.      Plan:   ED Course as of Mar 16 1503   Sun Mar 14, 2021   1625 Patient presents with several day history of malaise, fatigue, diffuse abdominal pain, one episode of diarrhea.  Differential diagnoses include not limited to bowel obstruction, colitis, appendicitis, diverticulitis, IBS, infectious diarrhea.    [EE]   1716 WBC(!): 19.92 [EE]   1716 Hemoglobin(!): 11.6 [EE]   1827 I discussed CT findings with Dr. Harkins, radiologist.  Patient appears to have appendicitis with a 5 x 3 cm early abscess/fluid collection  in the deep pelvis.  In addition there is an atypical appearance to the lungs raising concern for underlying lung issues.  He recommends pulmonary follow-up.    [EE]   1848 I discussed case with Dr. Rodriguez, general surgery.  She agrees to accept the patient.  She will admit the patient and take her to surgery tonight.    [EE]      ED Course User Index  [EE] Sean Sanchez, PA     Acute appendicitis on CT scan, discussed the abnormal lung findings with the patient as well, with need for further testing and evaluation by Pulmonary in the near future.     Attestation:  The PETE and I have discussed this patient's history, physical exam, and treatment plan.  I have reviewed the documentation and personally had a face to face interaction with the patient. I affirm the documentation and agree with the treatment and plan.  The attached note describes my personal findings.          Yuval Yarbrough MD  03/16/21 1004      Electronically signed by Yuval Yarbrough MD at 03/16/21 6733       {Outbreak/Travel/Exposure Documentation......;  Question Available Choices Patient Response   Outbreak Screen: Do you currently have a new onset of the following symptoms?        Fever/Chils, Cough, Shortness of air, Loss of taste or smell, No, Unknown  No (03/14/21 8549)   Outbreak Screen: In the last 14 days, have you had contact with anyone who is ill, has show any of the symptoms listed above and/or has been diagnosis with the 2019 Novel Coronavirus? This includes any immediate household members but excludes any patients with whom you have been in contact within your normal work duties wearing proper PPE, if you are a healthcare worker.  Yes, No, Unknown              No (03/14/21 9919)   Outbreak Screen: Who was notified?    Free text  (not recorded)   Travel Screen: Have you traveled in the last month? If so, to what country have you traveled? If [place] what state? Yes, No, Unknown  List of all countries  List of all States No (03/14/21  1549)  (not recorded)  (not recorded)   Infection Risk: Do you currently have the following symptoms?  (If cough is selected, the Tuberculosis Screen is performed.) Cough, Fever, Rash, No No (03/14/21 1549)   Tuberculosis Screen: Do you have any of the following Tuberculosis Risks?  · Have you lived or spent time with anyone who had or may have TB?  · Have you lived in or visited any of the following areas for more than one month: [place], [place], [place], [place] or [place], the [place] or [place]?  · Do you have HIV/AIDS?  · Have you lived in or worked in a nursing home, homeless shelter, correctional facility, or substance abuse treatment facility?   · No    If Yes do you have any of the following symptoms? Yes responses display to the right    If Yes, symptoms listed are:  Cough greater than or equal to 3 weeks, Loss of appetite, Unexplained weight loss, Night sweats, Bloody sputum or hemoptysis, Hoarseness, Fever, Fatigue, Chest pain, No (not recorded)  (not recorded)   Exposure Screen: Have you been exposed to any of these contagious diseases in the last month? Measles, Chickenpox, Meningitis, Pertussis, Whooping Cough, No No (03/14/21 1549)       Vital Signs (last 7 days)     Date/Time   Temp   Temp src   Pulse   Resp   BP   Patient Position   SpO2    03/17/21 0845   98.2 (36.8)   Oral   --   --   --   --   --    03/17/21 0709   (!) 100.9 (38.3)   Oral   107   18   138/87   Lying   93    03/17/21 0010   98.7 (37.1)   Oral   97   18   131/76   Lying   95    03/16/21 2026   98 (36.7)   Oral   96   18   157/98   Lying   95    03/16/21 1315   98 (36.7)   Oral   88   18   128/80   Lying   93    03/16/21 0735   98 (36.7)   Oral   80   18   114/86   Lying   --    03/15/21 2318   97.6 (36.4)   Oral   79   18   104/73   Lying   91    03/15/21 1900   98.2 (36.8)   Oral   79   18   106/81   Lying   94    03/15/21 1308   98.2 (36.8)   Oral   85   18   120/76   Lying   96    03/15/21 0718   97.5 (36.4)   Oral    97   18   112/74   Lying   95    03/15/21 0500   97.7 (36.5)   Oral   83   14   100/59   Lying   96    03/15/21 0300   98.2 (36.8)   Oral   90   16   106/58   Lying   94    03/15/21 0200   97.8 (36.6)   Oral   95   14   114/72   Lying   93    03/15/21 0115   97.8 (36.6)   Oral   102   14   108/61   Lying   94    03/15/21 0045   98.7 (37.1)   Oral   105   14   108/62   Lying   93    03/15/21 0015   98.5 (36.9)   Oral   105   14   108/70   Lying   93    03/15/21 0000   99.2 (37.3)   Oral   112   16   115/67   Lying   94    03/14/21 2345   98.8 (37.1)   Oral   107   14   112/70   Lying   94    03/14/21 2330   98.9 (37.2)   Oral   108   14   113/62   Lying   94    03/14/21 2315   98.7 (37.1)   Oral   120   14   101/68   Lying   96    03/14/21 2300   --   --   118   14   92/64   Lying   96    03/14/21 2245   --   --   119   14   93/53   Lying   97    03/14/21 2230   --   --   (!) 122   14   103/52   Lying   97    03/14/21 2215   --   --   (!) 125   14   113/51   Lying   94    03/14/21 2210   --   --   (!) 125   12   106/50   Lying   93    03/14/21 2203   100.1 (37.8)   Oral   (!) 122   12   (!) 90/39   Lying   93    03/14/21 2000   --   --   (!) 134   16   112/49   Lying   94    03/14/21 1930   --   --   (!) 138   --   113/64   Lying   92    03/14/21 1900   --   --   (!) 143   --   110/73   Lying   95    03/14/21 1830   (!) 103.1 (39.5)   Tympanic   (!) 148   --   137/99   Lying   97    03/14/21 1730   --   --   119   --   122/78   Lying   96    03/14/21 1700   --   --   (!) 123   --   121/81   Lying   96    03/14/21 16:53:31   --   --   (!) 130   18   116/78   Lying   96    03/14/21 1548   (!) 100.9 (38.3)   Tympanic   (!) 151   18   --   --   97              Oxygen Therapy (last 4 days)     Date/Time   SpO2   Device (Oxygen Therapy)   Flow (L/min)   Oxygen Concentration (%)   ETCO2 (mmHg)    03/17/21 0852   --   room air   --   --   --    03/17/21 0709   93   room air   --   --   --    03/17/21 0010   95   room air    --   --   --    03/17/21 0000   --   room air   --   --   --    03/16/21 2026   95   room air   --   --   --    03/16/21 2000   --   room air   --   --   --    03/16/21 1631   --   room air   --   --   --    03/16/21 1315   93   room air   --   --   --    03/16/21 0911   --   room air   --   --   --    03/16/21 0735   --   nasal cannula   1   --   --    03/16/21 0226   --   nasal cannula   1   --   --    03/15/21 2318   91   nasal cannula   1   --   --    03/15/21 2008   --   nasal cannula   1   --   --    03/15/21 1900   94   nasal cannula   1   --   --    03/15/21 1701   --   nasal cannula   1   --   --    03/15/21 1308   96   nasal cannula   --   --   --    03/15/21 0929   --   nasal cannula   3.5   --   --    03/15/21 0718   95   nasal cannula   --   --   --    03/15/21 0500   96   nasal cannula   4   --   --    03/15/21 0300   94   nasal cannula   4   --   --    03/15/21 0200   93   nasal cannula   4   --   --    03/15/21 0115   94   nasal cannula   --   --   --    03/15/21 0045   93   nasal cannula   --   --   --    03/15/21 0015   93   nasal cannula   --   --   --    03/15/21 0000   94   nasal cannula   --   --   --    03/14/21 2345   94   nasal cannula   --   --   --    03/14/21 2339   --   nasal cannula   4   --   --    03/14/21 2330   94   nasal cannula   4   --   --    03/14/21 2315   96   nasal cannula   4   --   --    03/14/21 2300   96   nasal cannula   4   --   --    03/14/21 2245   97   nasal cannula   4   --   --    03/14/21 2230   97   nasal cannula   4   --   --    03/14/21 2215   94   nasal cannula   4   --   --    03/14/21 2210   93   nasal cannula   4   --   --    03/14/21 2203   93   nasal cannula   4   --   --    03/14/21 2000 94   room air   --   --   --    03/14/21 1930 92   room air   --   --   --    03/14/21 1900 95   room air   --   --   --    03/14/21 1830 97   room air   --   --   --    03/14/21 1730   96   room air   --   --   --    03/14/21 1700   96   room air   --   --   --     03/14/21 16:53:31   96   room air   --   --   --    03/14/21 1548   97   room air   --   --   --            Intake & Output (last 4 days)       03/10 0701 - 03/11 0700 03/11 0701 - 03/12 0700 03/12 0701 - 03/13 0700 03/13 0701 - 03/14 0700 03/14 0701 - 03/15 0700 03/15 0701 - 03/16 0700 03/16 0701 - 03/17 0700 03/17 0701 - 03/18 0700    P.O.     120 360 250 120    I.V. (mL/kg)     2200       IV Piggyback     1050   50    Total Intake(mL/kg)     3370 360 250 (2.4) 170 (1.6)    Urine (mL/kg/hr)     625 1100 0 (0)     Drains     140 105 285 70    Stool       0     Total Output     765 1205 285 70    Net     +2605 -845 -35 +100                Urine Unmeasured Occurrence       3 x     Stool Unmeasured Occurrence       3 x           Lines, Drains & Airways    Active LDAs     Name:   Placement date:   Placement time:   Site:   Days:    Peripheral IV 03/14/21 1651 Right Antecubital   03/14/21    1651    Antecubital   2    Closed/Suction Drain 1 Left LLQ Bulb 19 Fr.   03/14/21    2115    LLQ   2                Medication Administration Report for Libby Huerta as of 03/17/21 1107    Legend:    Given Hold Not Given Due Canceled Entry Other Actions    Time Time (Time) Time  Time-Action       Discontinued     Completed     Future     MAR Hold     Linked           Medications 03/11/21 03/12/21 03/13/21 03/14/21 03/15/21 03/16/21 03/17/21    acetaminophen (TYLENOL) tablet 1,000 mg  Dose: 1,000 mg  Freq: Every 8 Hours Route: PO  Start: 03/15/21 0045    Admin Instructions:   Do not exceed 4 grams of acetaminophen in a 24 hr period. Max dose of 2gm for AST/ALT greater than 120 units/L      If given for pain, use the following pain scale:   Mild Pain = Pain Score of 1-3, CPOT 1-2  Moderate Pain = Pain Score of 4-6, CPOT 3-4  Severe Pain = Pain Score of 7-10, CPOT 5-8         0019       0929       1656        0143       0911       1506       (1612) [C]        0045 1101 0729           albuterol (PROVENTIL) nebulizer  solution 0.083% 2.5 mg/3mL  Dose: 2.5 mg  Freq: Every 6 Hours PRN Route: NEBULIZATION  PRN Reason: Shortness of Air  Start: 03/15/21 1948              enoxaparin (LOVENOX) syringe 40 mg  Dose: 40 mg  Freq: Nightly Route: SC  Indications of Use: PROPHYLAXIS OF VENOUS THROMBOEMBOLISM  Start: 03/15/21 2100    Admin Instructions:   Give subcutaneous in abdomen only. Do not massage site after injection.         2011 2019 2100           HYDROcodone-acetaminophen (NORCO) 5-325 MG per tablet 1 tablet  Dose: 1 tablet  Freq: Every 6 Hours PRN Route: PO  PRN Reason: Moderate Pain   Start: 03/17/21 0712   End: 03/24/21 0711    Admin Instructions:   [XIOMARA]    Do not exceed 4 grams of acetaminophen in a 24 hr period. Max dose of 2gm for AST/ALT greater than 120 units/L        If given for pain, use the following pain scale:   Mild Pain = Pain Score of 1-3, CPOT 1-2  Moderate Pain = Pain Score of 4-6, CPOT 3-4  Severe Pain = Pain Score of 7-10, CPOT 5-8               HYDROmorphone (DILAUDID) injection 0.5 mg  Dose: 0.5 mg  Freq: Every 2 Hours PRN Route: IV  PRN Reason: Severe Pain   Start: 03/14/21 2358   End: 03/21/21 2357    Admin Instructions:   If given for pain, use the following pain scale:  Mild Pain = Pain Score of 1-3, CPOT 1-2  Moderate Pain = Pain Score of 4-6, CPOT 3-4  Severe Pain = Pain Score of 7-10, CPOT 5-8         1349       2025        1506        0458           ibuprofen (ADVIL,MOTRIN) tablet 800 mg  Dose: 800 mg  Freq: 3 Times Daily Route: PO  Start: 03/15/21 0900    Admin Instructions:   If given for pain, use the following pain scale:  Mild Pain = Pain Score of 1-3, CPOT 1-2  Moderate Pain = Pain Score of 4-6, CPOT 3-4  Severe Pain = Pain Score of 7-10, CPOT 5-8         0930       1538       2010 0911       1505 2019 0847 1600 2100           ondansetron (ZOFRAN) injection 4 mg  Dose: 4 mg  Freq: Every 6 Hours PRN Route: IV  PRN Reasons: Nausea,Vomiting  Start:  03/14/21 2358        1406         0847           piperacillin-tazobactam (ZOSYN) 3.375 g in iso-osmotic dextrose 50 ml (premix)  Dose: 3.375 g  Freq: Every 8 Hours Route: IV  Indications of Use: INTRA-ABDOMINAL INFECTION  Start: 03/15/21 0045   End: 03/20/21 0044    Admin Instructions:   Refrigerate         0019       0930       1656        0144       0912       1751        0044       0847       1645           sodium chloride 0.9 % flush 10 mL  Dose: 10 mL  Freq: As Needed Route: IV  PRN Reason: Line Care  Start: 03/14/21 2358              sodium chloride 0.9 % flush 10 mL  Dose: 10 mL  Freq: Every 12 Hours Scheduled Route: IV  Start: 03/15/21 0045        0019       0929       2015 0911 2020        0847       2100           sodium chloride 0.9 % flush 10 mL  Dose: 10 mL  Freq: As Needed Route: IV  PRN Reason: Line Care  Start: 03/14/21 1624 2025       2359              sodium chloride 0.9 % infusion  Rate: 100 mL/hr Dose: 100 mL/hr  Freq: Continuous Route: IV  Start: 03/17/21 1045          1010          Completed Medications  Medications 03/11/21 03/12/21 03/13/21 03/14/21 03/15/21 03/16/21 03/17/21       acetaminophen (TYLENOL) tablet 1,000 mg  Dose: 1,000 mg  Freq: Once Route: PO  Start: 03/14/21 1829   End: 03/14/21 1849    Admin Instructions:   Do not exceed 4 grams of acetaminophen in a 24 hr period. Max dose of 2gm for AST/ALT greater than 120 units/L      If given for pain, use the following pain scale:   Mild Pain = Pain Score of 1-3, CPOT 1-2  Moderate Pain = Pain Score of 4-6, CPOT 3-4  Severe Pain = Pain Score of 7-10, CPOT 5-8        1849              famotidine (PEPCID) 10 MG/ML injection  - ADS Override Pull  Start: 03/14/21 2030   End: 03/14/21 2034    Admin Instructions:   Created by cabinet override  Dilute to 10 mL total volume and give IV push over 2 minutes.        2034 [C]              iopamidol (ISOVUE-300) 61 % injection 100 mL  Dose: 100 mL  Freq: Once in Imaging Route:  IV  Start: 03/14/21 1747   End: 03/14/21 1747       1747              LORazepam (ATIVAN) injection 1 mg  Dose: 1 mg  Freq: Once Route: IV  Start: 03/14/21 1840   End: 03/14/21 1849    Admin Instructions:      Caution: Look alike/sound alike drug alert        1849              piperacillin-tazobactam (ZOSYN) 3.375 g in iso-osmotic dextrose 50 ml (premix)  Dose: 3.375 g  Freq: Once Route: IV  Indications of Use: INTRA-ABDOMINAL INFECTION  Start: 03/14/21 1829   End: 03/14/21 1930    Admin Instructions:   Refrigerate        1852 1930              Scopolamine (TRANSDERM-SCOP) 1.5 MG/3DAYS patch  - ADS Override Pull  Start: 03/14/21 2031   End: 03/14/21 2316    Admin Instructions:   Created by cabinet override            2316 [C]              sodium chloride 0.9 % bolus 1,000 mL  Dose: 1,000 mL  Freq: Once Route: IV  Start: 03/14/21 1829   End: 03/14/21 1901       1831              sodium chloride 0.9 % bolus 1,000 mL  Dose: 1,000 mL  Freq: Once Route: IV  Start: 03/14/21 1627   End: 03/14/21 1830       1654       1830             Discontinued Medications  Medications 03/11/21 03/12/21 03/13/21 03/14/21 03/15/21 03/16/21 03/17/21       bupivacaine-EPINEPHrine (MARCAINE w/EPI) injection  Freq: As Needed  Start: 03/14/21 2100   End: 03/14/21 2203 2100              HYDROcodone-acetaminophen (NORCO) 7.5-325 MG per tablet 1 tablet  Dose: 1 tablet  Freq: Once As Needed Route: PO  PRN Reason: Mild Pain   Start: 03/14/21 2202   End: 03/14/21 2327    Admin Instructions:   [XIOMARA]    Do not exceed 4 grams of acetaminophen in a 24 hr period. Max dose of 2gm for AST/ALT greater than 120 units/L        If given for pain, use the following pain scale:   Mild Pain = Pain Score of 1-3, CPOT 1-2  Moderate Pain = Pain Score of 4-6, CPOT 3-4  Severe Pain = Pain Score of 7-10, CPOT 5-8               HYDROmorphone (DILAUDID) injection 0.5 mg  Dose: 0.5 mg  Freq: Every 5 Minutes PRN Route: IV  PRN Reasons: Moderate Pain ,Severe Pain    Start: 03/14/21 2202   End: 03/14/21 2327    Admin Instructions:   May alternate fentanyl with hydromorphone using fentanyl first.    Maximum total dose of hydromorphone is 2 mg.  If given for pain, use the following pain scale:  Mild Pain = Pain Score of 1-3, CPOT 1-2  Moderate Pain = Pain Score of 4-6, CPOT 3-4  Severe Pain = Pain Score of 7-10, CPOT 5-8               labetalol (NORMODYNE,TRANDATE) injection 5 mg  Dose: 5 mg  Freq: Every 5 Minutes PRN Route: IV  PRN Reason: High Blood Pressure  PRN Comment: for systolic blood pressure greater than 180 mmHg or diastolic blood pressure greater than 105 mmHg  Start: 03/14/21 2202   End: 03/14/21 2327    Admin Instructions:   Hold for heart rate less than 60.  Give by slow IV Push each 20mg (or less) over 2 minutes               naloxone (NARCAN) injection 0.2 mg  Dose: 0.2 mg  Freq: As Needed Route: IV  PRN Reasons: Opioid Reversal,Respiratory Depression  PRN Comment: unresponsiveness, decrease oxygen saturation  Indications of Use: ACUTE RESPIRATORY FAILURE,OPIOID-INDUCED RESPIRATORY DEPRESSION  Start: 03/14/21 2202   End: 03/14/21 2327    Admin Instructions:   Notify Anesthesia if given               ondansetron (ZOFRAN) injection 4 mg  Dose: 4 mg  Freq: Once As Needed Route: IV  PRN Reasons: Nausea,Vomiting  Indications of Use: POSTOPERATIVE NAUSEA AND VOMITING  Start: 03/14/21 2202   End: 03/14/21 2327    Admin Instructions:   If BOTH ondansetron (ZOFRAN) and promethazine (PHENERGAN) are ordered use ondansetron first and THEN promethazine IF ondansetron is ineffective.               polyethylene glycol (MIRALAX) packet 17 g  Dose: 17 g  Freq: Daily Route: PO  Start: 03/15/21 1345   End: 03/17/21 0712    Admin Instructions:   Use 4-8 ounces of water, tea, or juice for each 17 gram dose.         1538        (7534)            promethazine (PHENERGAN) suppository 25 mg  Dose: 25 mg  Freq: Once As Needed Route: RE  PRN Reasons: Nausea,Vomiting  Start: 03/14/21 2202    End: 03/14/21 2327    Admin Instructions:   If BOTH ondansetron (ZOFRAN) and promethazine (PHENERGAN) are ordered use ondansetron first and THEN promethazine IF ondansetron is ineffective.              Or  promethazine (PHENERGAN) tablet 25 mg  Dose: 25 mg  Freq: Once As Needed Route: PO  PRN Reasons: Nausea,Vomiting  Start: 03/14/21 2202   End: 03/14/21 2327    Admin Instructions:   If BOTH ondansetron (ZOFRAN) and promethazine (PHENERGAN) are ordered use ondansetron first and THEN promethazine IF ondansetron is ineffective.                   sodium chloride 0.9 % infusion  Rate: 50 mL/hr Dose: 50 mL/hr  Freq: Continuous Route: IV  Start: 03/15/21 0045   End: 03/16/21 1217        0019       0930       1349        0143                Lab Results (last 4 days)     Procedure Component Value Units Date/Time    Basic Metabolic Panel [[id number]]  (Abnormal) Collected: 03/17/21 0455    Specimen: Blood Updated: 03/17/21 0650     Glucose 72 mg/dL      BUN 13 mg/dL      Creatinine 0.92 mg/dL      Sodium 140 mmol/L      Potassium 3.4 mmol/L      Chloride 108 mmol/L      CO2 21.4 mmol/L      Calcium 7.8 mg/dL      eGFR Non African Amer 70 mL/min/1.73      BUN/Creatinine Ratio 14.1     Anion Gap 10.6 mmol/L     Narrative:      GFR Normal >60  Chronic Kidney Disease <60  Kidney Failure <15      CBC & Differential [[id number]]  (Abnormal) Collected: 03/17/21 0455    Specimen: Blood Updated: 03/17/21 0551    Narrative:      The following orders were created for panel order CBC & Differential.  Procedure                               Abnormality         Status                     ---------                               -----------         ------                     CBC Auto Differential[[id number]]        Abnormal            Final result                 Please view results for these tests on the individual orders.    CBC Auto Differential [[id number]]  (Abnormal) Collected: 03/17/21 0455    Specimen: Blood Updated: 03/17/21 0551      WBC 13.82 10*3/mm3      RBC 4.00 10*6/mm3      Hemoglobin 11.5 g/dL      Hematocrit 35.3 %      MCV 88.3 fL      MCH 28.8 pg      MCHC 32.6 g/dL      RDW 12.0 %      RDW-SD 38.2 fl      MPV 9.9 fL      Platelets 439 10*3/mm3      Neutrophil % 79.1 %      Lymphocyte % 12.5 %      Monocyte % 6.6 %      Eosinophil % 0.2 %      Basophil % 0.3 %      Immature Grans % 1.3 %      Neutrophils, Absolute 10.93 10*3/mm3      Lymphocytes, Absolute 1.73 10*3/mm3      Monocytes, Absolute 0.91 10*3/mm3      Eosinophils, Absolute 0.03 10*3/mm3      Basophils, Absolute 0.04 10*3/mm3      Immature Grans, Absolute 0.18 10*3/mm3      nRBC 0.0 /100 WBC     Blood Culture - Blood, Arm, Right [[id number]] Collected: 03/14/21 1832    Specimen: Blood from Arm, Right Updated: 03/16/21 1845     Blood Culture No growth at 2 days    Blood Culture - Blood, Arm, Left [[id number]] Collected: 03/14/21 1832    Specimen: Blood from Arm, Left Updated: 03/16/21 1845     Blood Culture No growth at 2 days    Tissue Pathology Exam [[id number]] Collected: 03/14/21 2127    Specimen: Tissue from Large Intestine, Appendix Updated: 03/16/21 1333     Case Report --     Surgical Pathology Report                         Case: [id number]                                  Authorizing Provider:  Char Rodriguez MD        Collected:           03/14/2021 09:27 PM          Ordering Location:     [facility]  Received:            03/15/2021 06:17 AM                                 MAIN OR                                                                      Pathologist:           Latanya Ortiz MD                                                          Specimen:    Large Intestine, Appendix, appendix                                                         Final Diagnosis --     1. Appendix, Appendectomy:    A. Acute necrotizing appendicitis, acute serositis and periappendicitis.    [facility]/kds       Gross Description --     1. Received in formalin labeled  "\"appendix\" is a 7.9 x up to 1.6 cm vermiform appendix that displays a red to white tan smooth to markedly roughened intact serosa with multiple fibrous adhesions.  There is an abundant amount of attached mesoappendix, 5.6 x 2.5 x 1.9 cm.  The tip is amputated and bisected to reveal pink tan glistening mucosa.  The lumen ranges from pinpoint to 0.5 cm in diameter and is filled with red bloody fluid.  There is no fecalith present.  The appendix wall ranges from 0.2 to 0.4 cm in thickness.  There are no perforations grossly identified.  Representative sections are submitted as follows:  1A - entire tip and surgical margin en face  1B - mid transverse three fragments to include serosal purulent exudate    ks/uso/[facility]/jse       Basic Metabolic Panel [[id number]]  (Abnormal) Collected: 03/16/21 0424    Specimen: Blood Updated: 03/16/21 0543     Glucose 100 mg/dL      BUN 17 mg/dL      Creatinine 0.82 mg/dL      Sodium 136 mmol/L      Potassium 4.0 mmol/L      Chloride 106 mmol/L      CO2 22.7 mmol/L      Calcium 8.3 mg/dL      eGFR Non African Amer 80 mL/min/1.73      BUN/Creatinine Ratio 20.7     Anion Gap 7.3 mmol/L     Narrative:      GFR Normal >60  Chronic Kidney Disease <60  Kidney Failure <15      CBC & Differential [[id number]]  (Abnormal) Collected: 03/16/21 0424    Specimen: Blood Updated: 03/16/21 0528    Narrative:      The following orders were created for panel order CBC & Differential.  Procedure                               Abnormality         Status                     ---------                               -----------         ------                     CBC Auto Differential[[id number]]        Abnormal            Final result                 Please view results for these tests on the individual orders.    CBC Auto Differential [[id number]]  (Abnormal) Collected: 03/16/21 0424    Specimen: Blood Updated: 03/16/21 0528     WBC 19.01 10*3/mm3      RBC 3.49 10*6/mm3      Hemoglobin 10.4 g/dL      Hematocrit 31.0 " %      MCV 88.8 fL      MCH 29.8 pg      MCHC 33.5 g/dL      RDW 11.9 %      RDW-SD 37.6 fl      MPV 10.2 fL      Platelets 360 10*3/mm3      Neutrophil % 87.3 %      Lymphocyte % 6.8 %      Monocyte % 4.3 %      Eosinophil % 0.0 %      Basophil % 0.3 %      Neutrophils, Absolute 16.60 10*3/mm3      Lymphocytes, Absolute 1.29 10*3/mm3      Monocytes, Absolute 0.82 10*3/mm3      Eosinophils, Absolute 0.00 10*3/mm3      Basophils, Absolute 0.05 10*3/mm3     Hemoglobin A1c [[id number]]  (Normal) Collected: 03/15/21 1854    Specimen: Blood Updated: 03/15/21 1931     Hemoglobin A1C 5.20 %     Narrative:      Hemoglobin A1C Ranges:    Increased Risk for Diabetes  5.7% to 6.4%  Diabetes                     >= 6.5%  Diabetic Goal                < 7.0%    Basic Metabolic Panel [[id number]]  (Abnormal) Collected: 03/15/21 0439    Specimen: Blood Updated: 03/15/21 0541     Glucose 135 mg/dL      BUN 11 mg/dL      Creatinine 0.74 mg/dL      Sodium 136 mmol/L      Potassium 4.4 mmol/L      Chloride 108 mmol/L      CO2 19.5 mmol/L      Calcium 7.9 mg/dL      eGFR Non African Amer 90 mL/min/1.73      BUN/Creatinine Ratio 14.9     Anion Gap 8.5 mmol/L     Narrative:      GFR Normal >60  Chronic Kidney Disease <60  Kidney Failure <15      Manual Differential [[id number]]  (Abnormal) Collected: 03/15/21 0439    Specimen: Blood Updated: 03/15/21 0537     Neutrophil % 94.9 %      Lymphocyte % 2.0 %      Monocyte % 3.0 %      Neutrophils Absolute 16.48 10*3/mm3      Lymphocytes Absolute 0.35 10*3/mm3      Monocytes Absolute 0.52 10*3/mm3      RBC Morphology Normal     WBC Morphology Normal     Platelet Morphology Normal    CBC & Differential [[id number]]  (Abnormal) Collected: 03/15/21 0439    Specimen: Blood Updated: 03/15/21 0516    Narrative:      The following orders were created for panel order CBC & Differential.  Procedure                               Abnormality         Status                     ---------                                -----------         ------                     CBC Auto Differential[[id number]]        Abnormal            Final result                 Please view results for these tests on the individual orders.    CBC Auto Differential [[id number]]  (Abnormal) Collected: 03/15/21 0439    Specimen: Blood Updated: 03/15/21 0516     WBC 17.37 10*3/mm3      RBC 3.62 10*6/mm3      Hemoglobin 10.8 g/dL      Hematocrit 33.0 %      MCV 91.2 fL      MCH 29.8 pg      MCHC 32.7 g/dL      RDW 11.9 %      RDW-SD 39.1 fl      MPV 9.9 fL      Platelets 319 10*3/mm3      nRBC 0.0 /100 WBC     Timed Lactic Acid, Reflex [[id number]]  (Normal) Collected: 03/14/21 2358    Specimen: Blood from Arm, Left Updated: 03/15/21 0043     Lactate 0.8 mmol/L     COVID PRE-OP / PRE-PROCEDURE SCREENING ORDER (NO ISOLATION) - Swab, Nasopharynx [[id number]]  (Normal) Collected: 03/14/21 1835    Specimen: Swab from Nasopharynx Updated: 03/14/21 1949    Narrative:      The following orders were created for panel order COVID PRE-OP / PRE-PROCEDURE SCREENING ORDER (NO ISOLATION) - Swab, Nasopharynx.  Procedure                               Abnormality         Status                     ---------                               -----------         ------                     COVID-19,[facility] IN-HOUSE...[[id number]]  Normal              Final result                 Please view results for these tests on the individual orders.    COVID-19,[facility] IN-HOUSE CEPHEID, NP SWAB IN TRANSPORT MEDIA 8-12 HR TAT - Swab, Nasopharynx [[id number]]  (Normal) Collected: 03/14/21 1835    Specimen: Swab from Nasopharynx Updated: 03/14/21 1949     COVID19 Not Detected    Narrative:      Fact sheet for providers: https://www.fda.gov/media/215430/download     Fact sheet for patients: https://www.fda.gov/media/002081/download    Urinalysis, Microscopic Only - Urine, Clean Catch [[id number]] Collected: 03/14/21 1832    Specimen: Urine, Clean Catch Updated: 03/14/21 1902     RBC, UA 0-2 /HPF       "WBC, UA 0-2 /HPF      Bacteria, UA None Seen /HPF      Squamous Epithelial Cells, UA 0-2 /HPF      Hyaline Casts, UA None Seen /LPF      Methodology Manual Light Microscopy    Lactic Acid, Plasma [[id number]]  (Abnormal) Collected: 03/14/21 1832    Specimen: Blood Updated: 03/14/21 1859     Lactate 2.3 mmol/L     Urinalysis With Microscopic If Indicated (No Culture) - Urine, Clean Catch [[id number]]  (Abnormal) Collected: 03/14/21 1832    Specimen: Urine, Clean Catch Updated: 03/14/21 1850     Color, UA Yellow     Appearance, UA Clear     pH, UA 6.5     Specific Gravity, UA 1.029     Glucose, UA Negative     Ketones, UA Negative     Bilirubin, UA Negative     Blood, UA Trace     Protein, UA Trace     Leuk Esterase, UA Negative     Nitrite, UA Negative     Urobilinogen, UA 0.2 E.U./dL    Procalcitonin [[id number]]  (Abnormal) Collected: 03/14/21 1654    Specimen: Blood Updated: 03/14/21 1759     Procalcitonin 8.96 ng/mL     Narrative:      As a Marker for Sepsis (Non-Neonates):   1. <0.5 ng/mL represents a low risk of severe sepsis and/or septic shock.  1. >2 ng/mL represents a high risk of severe sepsis and/or septic shock.    As a Marker for Lower Respiratory Tract Infections that require antibiotic therapy:  PCT on Admission     Antibiotic Therapy             6-12 Hrs later  > 0.5                Strongly Recommended            >0.25 - <0.5         Recommended  0.1 - 0.25           Discouraged                   Remeasure/reassess PCT  <0.1                 Strongly Discouraged          Remeasure/reassess PCT      As 28 day mortality risk marker: \"Change in Procalcitonin Result\" (> 80 % or <=80 %) if Day 0 (or Day 1) and Day 4 values are available. Refer to http://[email or web address]-pct-[email or web address]   Change in PCT <=80 %   A decrease of PCT levels below or equal to 80 % defines a positive change in PCT test result representing a higher risk for 28-day all-cause mortality of patients diagnosed with severe sepsis or septic " shock.  Change in PCT > 80 %   A decrease of PCT levels of more than 80 % defines a negative change in PCT result representing a lower risk for 28-day all-cause mortality of patients diagnosed with severe sepsis or septic shock.                Results may be falsely decreased if patient taking Biotin.     Comprehensive Metabolic Panel [[id number]]  (Abnormal) Collected: 03/14/21 1654    Specimen: Blood Updated: 03/14/21 1728     Glucose 112 mg/dL      BUN 13 mg/dL      Creatinine 1.11 mg/dL      Sodium 134 mmol/L      Potassium 4.1 mmol/L      Chloride 99 mmol/L      CO2 23.8 mmol/L      Calcium 8.9 mg/dL      Total Protein 7.7 g/dL      Albumin 3.30 g/dL      ALT (SGPT) 26 U/L      AST (SGOT) 10 U/L      Alkaline Phosphatase 88 U/L      Total Bilirubin 0.9 mg/dL      eGFR Non African Amer 57 mL/min/1.73      Globulin 4.4 gm/dL      A/G Ratio 0.8 g/dL      BUN/Creatinine Ratio 11.7     Anion Gap 11.2 mmol/L     Narrative:      GFR Normal >60  Chronic Kidney Disease <60  Kidney Failure <15      hCG, Serum, Qualitative [[id number]]  (Normal) Collected: 03/14/21 1654    Specimen: Blood Updated: 03/14/21 1723     HCG Qualitative Negative    Lipase [[id number]]  (Normal) Collected: 03/14/21 1654    Specimen: Blood Updated: 03/14/21 1719     Lipase 19 U/L     CBC & Differential [[id number]]  (Abnormal) Collected: 03/14/21 1654    Specimen: Blood Updated: 03/14/21 1707    Narrative:      The following orders were created for panel order CBC & Differential.  Procedure                               Abnormality         Status                     ---------                               -----------         ------                     CBC Auto Differential[[id number]]        Abnormal            Final result                 Please view results for these tests on the individual orders.    CBC Auto Differential [[id number]]  (Abnormal) Collected: 03/14/21 1654    Specimen: Blood Updated: 03/14/21 1707     WBC 19.92 10*3/mm3      RBC 3.99  10*6/mm3      Hemoglobin 11.6 g/dL      Hematocrit 35.1 %      MCV 88.0 fL      MCH 29.1 pg      MCHC 33.0 g/dL      RDW 12.0 %      RDW-SD 38.3 fl      MPV 9.6 fL      Platelets 397 10*3/mm3      Neutrophil % 89.7 %      Lymphocyte % 6.2 %      Monocyte % 3.1 %      Eosinophil % 0.0 %      Basophil % 0.4 %      Immature Grans % 0.6 %      Neutrophils, Absolute 17.88 10*3/mm3      Lymphocytes, Absolute 1.24 10*3/mm3      Monocytes, Absolute 0.62 10*3/mm3      Eosinophils, Absolute 0.00 10*3/mm3      Basophils, Absolute 0.07 10*3/mm3      Immature Grans, Absolute 0.11 10*3/mm3      nRBC 0.0 /100 WBC           Imaging Results (Last 4 Days)     Procedure Component Value Units Date/Time    CT Chest Without Contrast Diagnostic [[id number]] Collected: 03/16/21 1349     Updated: 03/16/21 1357    Narrative:      CT CHEST WO CONTRAST DIAGNOSTIC-     CLINICAL HISTORY: Follow-up abnormal lung parenchyma partially seen on  CT scan of the abdomen and pelvis     TECHNIQUE: Spiral CT images were obtained through the chest without IV  contrast and were reconstructed in 3 mm thick axial slices.     Radiation dose reduction techniques were utilized, including automated  exposure control and exposure modulation based on body size.     COMPARISON: CT scan of the abdomen and pelvis dated 03/14/2021     FINDINGS: There are numerous well marginated air-containing cysts of  varying size scattered throughout both lungs. The findings suggest the  possibility of lymphangioleiomyomatosis. No discrete lung masses are  identified. There are curvilinear bands of abnormal increased density  with adjacent pleural thickening located in the posterior aspects of the  right upper and lower lobes and also within the left lower lobe. Focal  pleural-based areas of consolidation are also noted in the posterior  aspects of both lower lobes that are new since the preceding abdomen CT.  These are likely areas of atelectasis. Tiny bilateral pleural  effusions  are also new. There is no mediastinal or hilar or axillary  lymphadenopathy. The heart is normal in size. Images through the upper  abdomen show no abnormalities.       Impression:      Multiple air containing cysts of varying size scattered  throughout both lungs raising the possibility of  lymphangioleiomyomatosis. No discrete lung masses are identified. There  are curvilinear areas of abnormal increased density scattered throughout  both lungs along with pleural-based areas of consolidation there  consistent with atelectasis. Tiny bilateral posteriorly layering pleural  effusions are present, and are new since a CT scan of the abdomen dated  03/14/2021.     This report was finalized on 3/16/2021 1:54 PM by Dr. Zurdo Perez M.D.       CT Abdomen Pelvis With Contrast [[id number]] Collected: 03/14/21 1831     Updated: 03/14/21 1831    Narrative:      CR  Patient: JANELLE ROSADO  Time Out: 18:30  Exam(s): CT ABDOMEN + PELVIS With Contrast     EXAM:    CT Abdomen and Pelvis With Intravenous Contrast    CLINICAL HISTORY:     Reason for exam: abd pain, diarrhea.    TECHNIQUE:    Axial computed tomography images of the abdomen and pelvis with   intravenous contrast.  CTDI is 28.80 mGy and DLP is 1739.20 mGy-cm.  This   CT exam was performed according to the principle of ALARA (As Low As   Reasonably Achievable) by using one or more of the following dose   reduction techniques: automated exposure control, adjustment of the mA   and or kV according to patient size, and or use of iterative   reconstruction technique.    COMPARISON:    No relevant prior studies available.    FINDINGS:  The included lower lungs demonstrate numerous air-filled cystic lesions,   largest in the inferior right middle lobe measuring 5.3 cm in maximal   axial dimension.  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    No other clear sequela such as renal angiomyolipoma or chylous thoracic   or abdominal collections.   Consider correlation with serum vascular   endothelial growth factor D (VEGF-D).  Langerhans' cell histiocytosis and   lymphocytic interstitial pneumonitis are also possible.  There is a 5 mm   juxtapleural nodule in the lateral right middle lobe.  Consider 12 month   follow-up.    The liver, biliary tree, pancreas, spleen, kidneys, and adrenal glands   are within normal limits.    The appendix is mildly dilated, up to 9 mm in diameter distally.  There   is extensive edema about the distal appendix.  Findings are consistent   with appendicitis.  Free pelvic fluid with peritoneal thickening may   reflect early abscess formation.  Maximal dimensions are 5 x 3.2 cm   (transverse by anteroposterior).  There is no free air.  Liquid colonic   contents would support provided history of diarrhea.    No abdominal aortic aneurysm.  Tiny fatty umbilical hernia.  No acute   fracture.    IMPRESSION:       Appendicitis.   5 cm deep pelvic fluid collection could reflect early   abscess.  No free air.    Liquid colonic contents, consistent with provided history of diarrhea.    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    See discussion above.    5 mm RML pulmonary nodule.  Consider 12 month follow-up      Impression:            Communications:    03 14 21 18:25 Call Doctor Regarding Appendicitis, called  Sean Sanchez PA-C on 03 14 18:25 (-04:00)    Electronically signed by Odin Fernandez M.D. on 03-14-21 at 1830        Orders (last 4 days)      Start     Ordered    03/17/21 1045  sodium chloride 0.9 % infusion  Continuous      03/17/21 0956    03/17/21 0712  HYDROcodone-acetaminophen (NORCO) 5-325 MG per tablet 1 tablet  Every 6 Hours PRN      03/17/21 0712    03/17/21 0600  CBC Auto Differential  PROCEDURE ONCE      03/17/21 0002    03/16/21 1449  Inpatient Pulmonology Consult  Once     Specialty:  Pulmonary Disease  Provider:  Gunner Lyon MD    03/16/21 1449    03/16/21 1218  Diet Regular   Diet Effective Now      03/16/21 1217    03/16/21 0948  CT Chest Without Contrast Diagnostic  1 Time Imaging      03/16/21 0947    03/16/21 0600  CBC Auto Differential  PROCEDURE ONCE      03/16/21 0001    03/16/21 0504  Manual Differential  Once,   Status:  Canceled      03/16/21 0503    03/15/21 2100  enoxaparin (LOVENOX) syringe 40 mg  Nightly      03/15/21 1258    03/15/21 2000  Incentive Spirometry  Every 2 Hours While Awake      03/15/21 1948    03/15/21 1948  albuterol (PROVENTIL) nebulizer solution 0.083% 2.5 mg/3mL  Every 6 Hours PRN      03/15/21 1948    03/15/21 1523  Hemoglobin A1c  Once      03/15/21 1523    03/15/21 1345  polyethylene glycol (MIRALAX) packet 17 g  Daily,   Status:  Discontinued      03/15/21 1258    03/15/21 1259  Discontinue Indwelling Urinary Catheter  Once      03/15/21 1258    03/15/21 1259  Notify Provider if Bladder Distention Continues  Until Discontinued      03/15/21 1258    03/15/21 1259  Consult Pharmacist For Review of Medications That May Cause Urinary Retention - RN To Place Order for Consult it Needed  Continuous      03/15/21 1258    03/15/21 1147  Inpatient Internal Medicine Consult  Once     Specialty:  Internal Medicine  Provider:  Severo Valle MD    03/15/21 1148    03/15/21 0900  ibuprofen (ADVIL,MOTRIN) tablet 800 mg  3 Times Daily      03/14/21 2359    03/15/21 0600  Incentive Spirometry  Every 4 Hours While Awake,   Status:  Canceled      03/14/21 2359    03/15/21 0600  Basic Metabolic Panel  Daily      03/14/21 2359    03/15/21 0600  CBC & Differential  Daily      03/14/21 2359    03/15/21 0600  CBC Auto Differential  PROCEDURE ONCE      03/14/21 2359    03/15/21 0512  Manual Differential  Once      03/15/21 0511    03/15/21 0045  sodium chloride 0.9 % flush 10 mL  Every 12 Hours Scheduled      03/14/21 2359    03/15/21 0045  sodium chloride 0.9 % infusion  Continuous,   Status:  Discontinued      03/14/21 2359    03/15/21 0045  acetaminophen (TYLENOL)  tablet 1,000 mg  Every 8 Hours      03/14/21 2359    03/15/21 0045  piperacillin-tazobactam (ZOSYN) 3.375 g in iso-osmotic dextrose 50 ml (premix)  Every 8 Hours      03/14/21 2359    03/15/21 0000  Vital Signs  Every 4 Hours      03/14/21 2359 03/14/21 2359  Intake & Output  Every Shift      03/14/21 2359 03/14/21 2359  Weigh Patient  Once      03/14/21 2359 03/14/21 2359  Oxygen Therapy- Nasal Cannula; Titrate for SPO2: 90% - 95%  Continuous      03/14/21 2359 03/14/21 2359  Insert Peripheral IV  Once      03/14/21 2359 03/14/21 2359  Saline Lock & Maintain IV Access  Continuous      03/14/21 2359 03/14/21 2359  Place Sequential Compression Device  Once      03/14/21 2359 03/14/21 2359  Maintain Sequential Compression Device  Continuous      03/14/21 2359 03/14/21 2359  Activity - Ad Shawanda  Until Discontinued      03/14/21 2359 03/14/21 2359  Diet Clear Liquid  Diet Effective Now,   Status:  Canceled      03/14/21 2359 03/14/21 2359  Drain Care  Once      03/14/21 2359 03/14/21 2359  Continue Indwelling Urinary Catheter  Once      03/14/21 2359 03/14/21 2359  Assess Need for Indwelling Urinary Catheter - Follow Removal Protocol  Continuous,   Status:  Canceled     Comments: Indwelling Urinary Catheter Removal Criteria  Discontinue Indwelling Urinary Catheter Unless One of the Following is Present:  Urinary Retention or Obstruction  Chronic Urinary Catheter Use  End of Life  Critical Illness with Strict I/O   Tract or Abdominal Surgery  Stage 3/4 Sacral / Perineal Wound  Required Activity Restriction: Trauma  Required Activity Restriction: Spine Surgery  If Patient is Being Followed by Urology Contact Them PRIOR to Removal  Do Not Remove Indwelling Urinary Catheter Order is Present with a CLINICAL REASON to Maintain the Catheter. Provider is Required to Include a Clinical Reason to Maintain a Urinary Catheter    Patient Admitted With Indwelling Urinary Catheter (Not Placed at  [facility])  Assess for Continued Need & Document Medical Necessity  If Infection is Suspected, Contact the Provider        03/14/21 2359 03/14/21 2358  sodium chloride 0.9 % flush 10 mL  As Needed      03/14/21 2359 03/14/21 2358  ondansetron (ZOFRAN) injection 4 mg  Every 6 Hours PRN      03/14/21 2359 03/14/21 2358  Urinary Catheter Care  Every Shift,   Status:  Canceled      03/14/21 2359 03/14/21 2358  HYDROmorphone (DILAUDID) injection 0.5 mg  Every 2 Hours PRN      03/14/21 2359 03/14/21 2203  POC Glucose Once  Once,   Status:  Canceled     Comments: Post op glucose check on all diabetic patients...Notify Anesthesia if blood sugar is less than 80 mg/dL or greater than 220 mg/dL.      03/14/21 2202 03/14/21 2203  Vital signs every 5 minutes for 15 minutes, every 15 minutes thereafter.  Once,   Status:  Canceled      03/14/21 2202 03/14/21 2203  Call Anesthesiologist for additional IV Fluid bolus for Hypotension/Tachycardia  Until Discontinued,   Status:  Canceled      03/14/21 2202 03/14/21 2203  Notify Anesthesia of Any Acute Changes in Patient Condition  Until Discontinued,   Status:  Canceled      03/14/21 2202 03/14/21 2203  Notify Anesthesia for Unrelieved Pain  Until Discontinued,   Status:  Canceled      03/14/21 2202 03/14/21 2203  Once DC criteria to floor met, follow surgeon's orders.  Until Discontinued,   Status:  Canceled      03/14/21 2202 03/14/21 2203  Discharge patient from PACU when discharge criteria is met.  Until Discontinued,   Status:  Canceled      03/14/21 2202 03/14/21 2202  HYDROcodone-acetaminophen (NORCO) 7.5-325 MG per tablet 1 tablet  Once As Needed,   Status:  Discontinued      03/14/21 2202 03/14/21 2202  HYDROmorphone (DILAUDID) injection 0.5 mg  Every 5 Minutes PRN,   Status:  Discontinued      03/14/21 2202 03/14/21 2202  oxyCODONE-acetaminophen (PERCOCET) 7.5-325 MG per tablet 1 tablet  Once As Needed,   Status:  Discontinued       03/14/21 2202 03/14/21 2202  fentaNYL citrate (PF) (SUBLIMAZE) injection 50 mcg  Every 5 Minutes PRN,   Status:  Discontinued      03/14/21 2202 03/14/21 2202  naloxone (NARCAN) injection 0.2 mg  As Needed,   Status:  Discontinued      03/14/21 2202 03/14/21 2202  flumazenil (ROMAZICON) injection 0.2 mg  As Needed,   Status:  Discontinued      03/14/21 2202 03/14/21 2202  ondansetron (ZOFRAN) injection 4 mg  Once As Needed,   Status:  Discontinued      03/14/21 2202 03/14/21 2202  promethazine (PHENERGAN) suppository 25 mg  Once As Needed,   Status:  Discontinued      03/14/21 2202 03/14/21 2202  promethazine (PHENERGAN) tablet 25 mg  Once As Needed,   Status:  Discontinued      03/14/21 2202 03/14/21 2202  labetalol (NORMODYNE,TRANDATE) injection 5 mg  Every 5 Minutes PRN,   Status:  Discontinued      03/14/21 2202 03/14/21 2202  hydrALAZINE (APRESOLINE) injection 5 mg  Every 10 Minutes PRN,   Status:  Discontinued      03/14/21 2202 03/14/21 2202  ePHEDrine injection 5 mg  Once As Needed,   Status:  Discontinued      03/14/21 2202 03/14/21 2202  diphenhydrAMINE (BENADRYL) injection 12.5 mg  Every 15 Minutes PRN,   Status:  Discontinued      03/14/21 2202 03/14/21 2202  diphenhydrAMINE (BENADRYL) capsule 25 mg  Every 30 Minutes PRN,   Status:  Discontinued      03/14/21 2202 03/14/21 2202  Pulse Oximetry, Continuous  Continuous      03/14/21 2201 03/14/21 2201  Oxygen Therapy- Nasal Cannula; Titrate for SPO2: per policy  Continuous PRN,   Status:  Canceled      03/14/21 2201 03/14/21 2152  Code Status and Medical Interventions:  Continuous      03/14/21 2153 03/14/21 2132  Timed Lactic Acid, Reflex  PROCEDURE ONCE      03/14/21 1859    03/14/21 2130  Tissue Pathology Exam  RELEASE UPON ORDERING      03/14/21 2130 03/14/21 2100  bupivacaine-EPINEPHrine (MARCAINE w/EPI) injection  As Needed,   Status:  Discontinued      03/14/21 2121 03/14/21 2031  Scopolamine  (TRANSDERM-SCOP) 1.5 MG/3DAYS patch  - ADS Override Pull     Note to Pharmacy: Created by cabinet override    03/14/21 2031 03/14/21 2030  famotidine (PEPCID) 10 MG/ML injection  - ADS Override Pull     Note to Pharmacy: Created by cabinet override    03/14/21 2030 03/14/21 2015  Pulse Oximetry, Continuous  Continuous,   Status:  Canceled      03/14/21 2014 03/14/21 1850  Inpatient Admission  Once      03/14/21 1849 03/14/21 1850  Cardiac Monitoring  Continuous      03/14/21 1849 03/14/21 1847  Urinalysis, Microscopic Only - Urine, Clean Catch  Once      03/14/21 1846 03/14/21 1840  LORazepam (ATIVAN) injection 1 mg  Once      03/14/21 1838    03/14/21 1833  COVID PRE-OP / PRE-PROCEDURE SCREENING ORDER (NO ISOLATION) - Swab, Nasopharynx  Once      03/14/21 1832    03/14/21 1833  COVID-19, MARK IN-HOUSE CEPHEID, NP SWAB IN TRANSPORT MEDIA 8-12 HR TAT - Swab, Nasopharynx  PROCEDURE ONCE      03/14/21 1832    03/14/21 1829  piperacillin-tazobactam (ZOSYN) 3.375 g in iso-osmotic dextrose 50 ml (premix)  Once      03/14/21 1827    03/14/21 1829  sodium chloride 0.9 % bolus 1,000 mL  Once      03/14/21 1827    03/14/21 1829  acetaminophen (TYLENOL) tablet 1,000 mg  Once      03/14/21 1827    03/14/21 1827  Surgery (on-call MD unless specified)  Once     Specialty:  General Surgery  Provider:  (Not yet assigned)    03/14/21 1827    03/14/21 1747  iopamidol (ISOVUE-300) 61 % injection 100 mL  Once in Imaging      03/14/21 1745    03/14/21 1732  Blood Culture - Blood, Arm, Right  Once      03/14/21 1731    03/14/21 1732  Blood Culture - Blood, Arm, Left  Once      03/14/21 1731    03/14/21 1732  Lactic Acid, Plasma  Once      03/14/21 1731    03/14/21 1732  Procalcitonin  Once      03/14/21 1731    03/14/21 1627  sodium chloride 0.9 % bolus 1,000 mL  Once      03/14/21 1625    03/14/21 1625  CBC & Differential  Once      03/14/21 1625    03/14/21 1625  Comprehensive Metabolic Panel  Once      03/14/21 1625     03/14/21 1625  Lipase  Once      03/14/21 1625    03/14/21 1625  Urinalysis With Microscopic If Indicated (No Culture) - Urine, Clean Catch  Once      03/14/21 1625    03/14/21 1625  hCG, Serum, Qualitative  Once      03/14/21 1625    03/14/21 1625  Insert peripheral IV  Once      03/14/21 1625    03/14/21 1625  CT Abdomen Pelvis With Contrast  1 Time Imaging     Comments: IV CONTRAST ONLY      03/14/21 1625    03/14/21 1625  CBC Auto Differential  PROCEDURE ONCE      03/14/21 1625    03/14/21 1624  sodium chloride 0.9 % flush 10 mL  As Needed      03/14/21 1625    Unscheduled  Bladder Scan if Patient Unable to Void 4-6 Hours After Catheter Removal  As Needed      03/15/21 1258    Unscheduled  If Bladder Scan Volume is Less Than 500mL & Patient is Without Symptoms of Bladder Discomfort / Distention Monitor Every 1-2 Hours for Spontaneous Void  As Needed      03/15/21 1258    Unscheduled  Straight Cath Every 4-6 Hours As Needed If Patient is Unable to Void After 4-6 Hours, Bladder Scan Volume is Greater Than 500mL & Patient Has Symptoms of Bladder Discomfort / Distention  As Needed      03/15/21 1258    Unscheduled  Schedule / Prompt Voiding For Patients With Urinary Incontinence  As Needed      03/15/21 1258    Signed and Held  Vital Signs - Per Anesthesia Protocol  As Needed,   Status:  Canceled      Signed and Held    Signed and Held  Oxygen Therapy- Nasal Cannula; Titrate for SPO2: Per Policy  Continuous PRN,   Status:  Canceled      Signed and Held    Signed and Held  Pulse Oximetry, Continuous  Continuous PRN,   Status:  Canceled      Signed and Held    Signed and Held  POC Glucose PRN  As Needed,   Status:  Canceled     Comments: For all diabetic patients. Notify Anesthesiologist for blood sugar > 180.      Signed and Held    Signed and Held  POC Urine Pregnancy Test  Once,   Status:  Canceled      Signed and Held    Signed and Held  Insert Peripheral IV  Once,   Status:  Canceled      Signed and Held     Signed and Held  Saline Lock & Maintain IV Access  Continuous,   Status:  Canceled      Signed and Held    Signed and Held  sodium chloride 0.9 % flush 3 mL  Every 12 Hours Scheduled,   Status:  Canceled      Signed and Held    Signed and Held  sodium chloride 0.9 % flush 3-10 mL  As Needed,   Status:  Canceled      Signed and Held    Signed and Held  lidocaine PF 1% (XYLOCAINE) injection 0.5 mL  Once As Needed,   Status:  Canceled      Signed and Held    Signed and Held  lactated ringers infusion  Continuous,   Status:  Canceled      Signed and Held    Signed and Held  fentaNYL citrate (PF) (SUBLIMAZE) injection 50 mcg  Every 10 Minutes PRN,   Status:  Canceled      Signed and Held    Signed and Held  midazolam (VERSED) injection 1 mg  Every 10 Minutes PRN,   Status:  Canceled      Signed and Held    Signed and Held  midazolam (VERSED) injection 2 mg  Every 10 Minutes PRN,   Status:  Canceled      Signed and Held    Signed and Held  famotidine (PEPCID) injection 20 mg  Once,   Status:  Canceled      Signed and Held    Signed and Held  May take Beta Blocker from home with sip of water.  Once,   Status:  Canceled     Comments: Only applicable to patients on home Beta Blocker    Signed and Held    Signed and Held  POC Glucose Once  Once,   Status:  Canceled     Comments: For all diabetic patients. Notify Anesthesiologist for blood sugar greater than 180 or less than 60..      Signed and Held              Operative/Procedure Notes (all)      Char Rodriguez MD at 03/14/21 2046  Version 1 of 1       OPERATIVE REPORT     DATE OF OPERATION: 3/14/2021    SURGEON:   Char Rodriguez MD    PREOPERATIVE DIAGNOSIS: Acute ruptured appendicitis    POSTOPERATIVE DIAGNOSIS: Acute ruptured appendicitis    PROCEDURE PERFORMED: Laparoscopic appendectomy    ANESTHESIA: General    SPECIMEN: Appendix    DRAINS: 19 French Iam drain in the left lower quadrant    BLOOD LOSS: Minimal    INDICATIONS FOR OPERATION: Mrs. Libby Huerta is a  35 y.o. year old lady who presented to the emergency room with a 6-day history of right lower quadrant abdominal pain.  CT scan was consistent with ruptured appendicitis with a dense inflammatory reaction.  Laparoscopic appendectomy with possible exploratory laparotomy due to the degree of inflammation were recommended.  All risks (including bleeding, infection, damage to surrounding structures), benefits, and alternatives were explained to the patient and she agreed and wished to proceed.  Informed consent was obtained.    OPERATIVE REPORT: The patient was taken to the operating room, transferred onto the operating room table, and underwent general endotracheal anesthesia without incident. The patient was prepped and draped in the usual sterile fashion.  A Ruiz catheter was placed.  Preoperative antibiotics were given, and a timeout was performed.  Half percent Marcaine with epinephrine was and injected into the skin and subcutaneous tissues. A scalpel was used just superior to the umbilicus and the skin and subcutaneous tissues were dissected to the level of the fascia, which was entered under direct visualization.  0 Vicryl sutures were placed in the fascia.  A Angeles port was placed and the abdomen was insufflated.  A lower midline 5 mm port and a left lower quadrant 5 mm port were then placed under direct visualization.  There was a severe amount of inflammation in the pelvis.  There was a large amount of purulent material around the uterus, ovaries, and cul-de-sac.  The omentum was densely adherent to the pelvis as well.  This was bluntly dissected and retracted superiorly.  The cecum was identified and the base of the appendix was located.  This was tracked towards the pelvis where was densely adherent to the right tube and ovary.  This was bluntly dissected free.  The area was irrigated obviously.  Once the appendix was no longer adherent to the right tube and ovary, it was retracted superiorly and  laterally.  The harmonic device was used to come across the mesoappendix to the base right where it met the cecum.  At this time Dr. Lopez with gynecology was asked to evaluate the gynecologic anatomy due to the amount of inflammation.  See his consult note for his findings but ultimately believed everything appeared viable although inflamed and no further dissection should be done at this time.  The [place] stapler with a blue load was then used to take the appendix at its base right where it meets the cecum.  Once this was done, the bag was introduced and the appendix was removed from the abdomen.  The area was then irrigated and inspected for hemostasis.  There was no bleeding noted.  There was a large inflammatory rind and purulent material all throughout the pelvis.  This was irrigated copiously with multiple liters of normal saline.  Once as much of the purulent material was evacuated as possible, a 19 Romanian Iam drain was passed into the left lower quadrant port.  It was placed along the pelvis and up near the cecum.  It was sutured in place with a 2-0 silk stitch.  The ports were then removed under direct visualization.  The fascial sutures were tied. The incisions were then closed with 4-0 Vicryl sutures and Dermabond.  All needle and lap counts were correct at the end of the case. The patient was awoken from general endotracheal anesthesia and taken to the recovery area for further monitoring.    CHAR LIMON M.D.  General and Endoscopic Surgery  [facility]    [address], [address]  [place], [place], [address]  P: [phone]  F: [phone]       Electronically signed by Char Limon MD at 03/14/21 8889          Physician Progress Notes (last 4 days) (Notes from 03/14/21 1107 through 03/17/21 1107)      Char Limon MD at 03/17/21 0711          General Surgery Progress Note    Summary:  Mrs. Libby Huerta is a 35 y.o. year old lady POD3 s/p lap appendectomy for ruptured  "appendicitis. Febrile this morning, WBC 13 from 19, on zosyn, continue to monitor. On a regular diet, on bowel regimen. Tachycardic, restart IVF. May need CT abdomen/pelvis in next 1-2 days to eval for abscess. Lovenox. Seen by pulmonary for CT chest findings.     Interval Events: No acute events overnight. Tmax 100.9 this am. Feeling nauseated. Having diarrhea. Beginning to walk around the room.     Vitals: /76 (BP Location: Left arm, Patient Position: Lying)   Pulse 97   Temp 98.7 °F (37.1 °C) (Oral)   Resp 18   Ht 165.1 cm (65\")   Wt 105 kg (231 lb)   SpO2 95%   BMI 38.44 kg/m²     Drain 285cc    GENERAL: alert, well appearing, and in no distress  HEENT: normocephalic, atraumatic, moist mucous membranes, clear sclerae   CHEST: on 1L NC, no increased work of breathing, symmetric air entry  CARDIAC: regular rate and rhythm    ABDOMEN: Soft, mildly distended, appropriately tender to palpation, KERI with some purulent drainage  EXTREMITIES: no cyanosis, clubbing, or edema   SKIN: Warm and moist, no rashes      CHAR LIMON MD  General and Endoscopic Surgery  [facility]    [address], [address]  [place], [place], [address]  P: [phone]  F: [phone]       Electronically signed by Char Limon MD at 03/17/21 4031     Socorro Mulligan APRN at 03/16/21 1044     Attestation signed by Severo Valle MD at 03/16/21 3847    I have personally interviewed and examined the patient as well as reviewed her clinical data including labs and chest CT and I agree with the above note. Patient overall is feeling well and has no new complaints. Tolerating PO. Denies cough and dyspnea. Has mild reactive hyperglycemia A1C is okay. WBC is a little higher today but clinically she looks to be improving and is on adequate antimicrobial therapy. Continue pulmonary toilet-she has been weaned to room air. 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 I d/w patient and her mother at bedside as well as RN    Attending Physical Exam  Vitals and nursing note reviewed.   Constitutional:       Appearance: Normal appearance.   HENT:      Head: Normocephalic and atraumatic.   Eyes:      Extraocular Movements: Extraocular movements intact.      Conjunctiva/sclera: Conjunctivae normal.   Cardiovascular:      Rate and Rhythm: Normal rate and regular rhythm.   Pulmonary:      Effort: Pulmonary effort is normal. No respiratory distress.      Breath sounds: Decreased in bibasilar fields but no wheezes or rales.  Abdominal:      General: Bowel sounds are normal. There is no distension.      Palpations: Abdomen is soft.      Tenderness: There is appropriate postoperative tenderness without guarding or rebound.   Musculoskeletal:         General: No swelling. Normal range of motion.      Cervical back: Normal range of motion and neck supple.   Skin:     General: Skin is warm and dry.      Comments: KERI drain LLQ, lap sites CDI  Neurological:      Mental Status: She is alert and oriented to person, place, and time. Mental status is at baseline.   Psychiatric:         Mood and Affect: Mood normal.         Behavior: Behavior normal.     Severo Valle MD  3/16/2021  17:47 EDT                          Name: Libby Huerta ADMIT: 3/14/2021   : 1985  PCP: Kristan Langford APRN    MRN: [id number] LOS: 2 days   AGE/SEX: 35 y.o. female  ROOM: [facility]     Subjective   Subjective   no new complaints or events overnight. still with some shortness of breath on exertion. tolerating clears and looks like she is going to be advanced. had a bowel movement this morning.     Review of Systems   Constitutional: Negative for chills and fever.   Respiratory: Positive for shortness of breath. Negative for chest tightness.    Cardiovascular: Negative for chest pain and palpitations.   Gastrointestinal: Positive for abdominal pain. Negative for constipation.   Genitourinary: Negative  for difficulty urinating and dysuria.   Musculoskeletal: Positive for gait problem. Negative for back pain.   Psychiatric/Behavioral: Negative for agitation and behavioral problems.       Objective   Objective   Vital Signs  Temp:  [97.6 °F (36.4 °C)-98.2 °F (36.8 °C)] 98 °F (36.7 °C)  Heart Rate:  [79-85] 80  Resp:  [18] 18  BP: (104-120)/(73-86) 114/86  SpO2:  [91 %-96 %] 91 %  on  Flow (L/min):  [1] 1;   Device (Oxygen Therapy): room air  Body mass index is 38.44 kg/m².  Physical Exam  Vitals and nursing note reviewed.   Constitutional:       Appearance: Normal appearance. She is obese.   Cardiovascular:      Rate and Rhythm: Normal rate and regular rhythm.   Pulmonary:      Effort: Pulmonary effort is normal. No respiratory distress.      Breath sounds: Normal breath sounds.   Abdominal:      General: Bowel sounds are normal. There is no distension.      Tenderness: There is abdominal tenderness.   Skin:     General: Skin is warm and dry.      Comments: KERI drain LLQ   Neurological:      Mental Status: She is alert and oriented to person, place, and time. Mental status is at baseline.       Results Review     I reviewed the patient's new clinical results.    CT Abdomen Pelvis With Contrast  Narrative: CR  Patient: JANELLE ROASDO  Time Out: 18:30  Exam(s): CT ABDOMEN + PELVIS With Contrast     EXAM:    CT Abdomen and Pelvis With Intravenous Contrast    CLINICAL HISTORY:     Reason for exam: abd pain, diarrhea.    TECHNIQUE:    Axial computed tomography images of the abdomen and pelvis with   intravenous contrast.  CTDI is 28.80 mGy and DLP is 1739.20 mGy-cm.  This   CT exam was performed according to the principle of ALARA (As Low As   Reasonably Achievable) by using one or more of the following dose   reduction techniques: automated exposure control, adjustment of the mA   and or kV according to patient size, and or use of iterative   reconstruction technique.    COMPARISON:    No relevant prior studies  available.    FINDINGS:  The included lower lungs demonstrate numerous air-filled cystic lesions,   largest in the inferior right middle lobe measuring 5.3 cm in maximal   axial dimension.  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    No other clear sequela such as renal angiomyolipoma or chylous thoracic   or abdominal collections.  Consider correlation with serum vascular   endothelial growth factor D (VEGF-D).  Langerhans' cell histiocytosis and   lymphocytic interstitial pneumonitis are also possible.  There is a 5 mm   juxtapleural nodule in the lateral right middle lobe.  Consider 12 month   follow-up.    The liver, biliary tree, pancreas, spleen, kidneys, and adrenal glands   are within normal limits.    The appendix is mildly dilated, up to 9 mm in diameter distally.  There   is extensive edema about the distal appendix.  Findings are consistent   with appendicitis.  Free pelvic fluid with peritoneal thickening may   reflect early abscess formation.  Maximal dimensions are 5 x 3.2 cm   (transverse by anteroposterior).  There is no free air.  Liquid colonic   contents would support provided history of diarrhea.    No abdominal aortic aneurysm.  Tiny fatty umbilical hernia.  No acute   fracture.    IMPRESSION:       Appendicitis.   5 cm deep pelvic fluid collection could reflect early   abscess.  No free air.    Liquid colonic contents, consistent with provided history of diarrhea.    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    See discussion above.    5 mm RML pulmonary nodule.  Consider 12 month follow-up  Impression: Communications:    03 14 21 18:25 Call Doctor Regarding Appendicitis, called  Sean Sanchez PA-C on 03 14 18:25 (-04:00)    Electronically signed by Odin Fernandez M.D. on 03-14-21 at 1830    Scheduled Medications  acetaminophen, 1,000 mg, Oral, Q8H  enoxaparin, 40 mg, Subcutaneous, Nightly  ibuprofen, 800 mg, Oral, TID  piperacillin-tazobactam,  "3.375 g, Intravenous, Q8H  polyethylene glycol, 17 g, Oral, Daily  sodium chloride, 10 mL, Intravenous, Q12H    Infusions  sodium chloride, 50 mL/hr, Last Rate: 50 mL/hr (03/16/21 0143)    Diet  Diet Clear Liquid      Assessment/Plan     Active Hospital Problems    Diagnosis  POA   • Obesity (BMI 30-39.9) [E66.9]  Yes   • Leukocytosis [D72.829]  Yes   • Elevated random blood glucose level [R73.09]  Yes   • Postoperative hypoxia [R09.02, Z98.890]  Unknown   • Acute appendicitis with localized peritonitis and abscess, without gangrene [K35.33]  Yes      Resolved Hospital Problems   No resolved problems to display.       · doing well postoperatively. teixeira catheter removed and voiding. continue supportive care with IVFs, analgesics and antiemetics. encourage IS.  Monitor labs, leukocytosis a little worse today, continue Zosyn. Wean o2 as tolerated. tachycardia has improved. 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   · check a1c as glucose has been running high without a history of DM2. A1c 5.2 discussed lifestyle modifications.           Socorro Mulligan APRJESS  [facility]  03/16/21  10:48 EDT        Electronically signed by Severo Valle MD at 03/16/21 2429     Char Rodriguez MD at 03/16/21 8424          General Surgery Progress Note    Summary:  Mrs. Libby Huerta is a 35 y.o. year old lady POD2 s/p lap appendectomy for ruptured appendicitis. Afebrile, WBC 19 from 17, on zosyn, continue to monitor. Advance to regular diet, on bowel regimen. Lovenox.      Interval Events: No acute events overnight.  Tolerating a clear liquid diet.  Had a bowel movement.  Got out of bed a few times.    Vitals: /73 (BP Location: Left arm, Patient Position: Lying)   Pulse 79   Temp 97.6 °F (36.4 °C) (Oral)   Resp 18   Ht 165.1 cm (65\")   SpO2 91%   BMI 32.02 kg/m²     Drain " "105cc    GENERAL: alert, well appearing, and in no distress  HEENT: normocephalic, atraumatic, moist mucous membranes, clear sclerae   CHEST: on 1L NC, no increased work of breathing, symmetric air entry  CARDIAC: regular rate and rhythm    ABDOMEN: Soft, mildly distended, appropriately tender to palpation, KERI with some purulent drainage  EXTREMITIES: no cyanosis, clubbing, or edema   SKIN: Warm and moist, no rashes      CHAR LIMON MD  General and Endoscopic Surgery  [facility]    [address], [address]  [place], [place], [address]  P: [phone]  F: [phone]       Electronically signed by Char Limon MD at 03/16/21 1427     Char Limon MD at 03/15/21 0954          General Surgery Progress Note    Summary:  Mrs. Libby Huerta is a 35 y.o. year old lady POD1 s/p lap appendectomy for ruptured appendicitis. On CLD, IVF, bowel regimen. PRN pain medication as needed. D/c teixeira today. Continue zosyn, WBC down to 17 today. Continue drain. Starting lovenox.     Interval Events: No acute events overnight.  Tolerating his clear liquid diet, feels well overall.  Has not been out of bed yet.    Vitals: /74 (BP Location: Left arm, Patient Position: Lying)   Pulse 97   Temp 97.5 °F (36.4 °C) (Oral)   Resp 18   Ht 165.1 cm (65\")   SpO2 95%   BMI 32.02 kg/m²     Drain 140cc    GENERAL: alert, well appearing, and in no distress  HEENT: normocephalic, atraumatic, moist mucous membranes, clear sclerae   CHEST: on 4L NC, no increased work of breathing, symmetric air entry  CARDIAC: regular rate and rhythm    ABDOMEN: Soft, mildly distended, appropriately tender to palpation, KERI serosanguineous  EXTREMITIES: no cyanosis, clubbing, or edema   SKIN: Warm and moist, no rashes      CHAR LIMON MD  General and Endoscopic Surgery  [facility]    [address], [address]  [place], [place], [address]  P: [phone]  F: [phone]     Electronically signed by Char Limon, " MD at 03/15/21 1514          Consult Notes (last 4 days) (Notes from 03/14/21 through 03/17/21)      Elizabeth Lopez MD at 03/14/21 2128        Intraoperative consult requested by Dr. Kait Rodriguez for evaluation of gynecologic anatomical integrity in patient with ruptured appendix.  Upon my arrival to the OR, Dr. Rodriguez had dissected the right adnexa free from surrounding adhesions.  No clubbing noted of the right Fallopian tube although hyperemia and edema noted of the tube from isthmus to ampulla.  Proximal kinking of the tube noted.  Good hemostasis noted.  The distal left tube and ovary were covered by adherent small bowel, however no dilation of the proximal left tube noted.  Due to significant inflammatory response from ruptured appendix, further surgical dissection could lead to loss of adnexa(e).  Should future fertility be desired, a hysterosalpingogram 6+ weeks from now when inflammatory changes have resolved could prove beneficial to assess tubal patency.  Upon discharge, should Ms. Huerta not have an established gynecologist, she should arrange follow up with Dr. Candy Rosado at [phone].  If you would like us to follow Ms. Huerta postoperatively during this hospitalization, please call myself or one of my partners on our in house phone, [phone].    Electronically signed by Elizabeth Lopez MD at 03/14/21 2136     Socorro Mulligan APRN at 03/15/21 1518      Consult Orders    1. Inpatient Internal Medicine Consult [[id number]] ordered by Char Rodriguez MD          Attestation signed by Severo Valle MD at 03/16/21 3482    I have personally interviewed and examined the patient as well as reviewed her clinical data including labs, imaging, and telemetry and I agree with the above note with the following additions. Patient with a remote history of asthma and anxiety otherwise no known significant past medical history admitted to the surgery service with acute ruptured appendicitis and is  s/p laparoscopic appendectomy.  We are consulted for medical management, specifically to evaluate her hyperglycemia, hypoxemia, and abnormal lung images on her CT abdomen and pelvis.  She denies recent pulmonary symptoms of cough and dyspnea. She is on 2L of oxygen via nasal cannula. She is not bronchospastic on my exam. Will encourage pulmonary toilet and I have asked RN to provide incentive spirometry. Will check A1C to evaluate hyperglycemia but I suspect that this is due to physiologic stress from surgery.  Regarding her lung imaging abnormality we will start with a dedicated chest CT scan.    Physical Exam  Vitals and nursing note reviewed.   Constitutional:       Appearance: Normal appearance.   HENT:      Head: Normocephalic and atraumatic.   Eyes:      Extraocular Movements: Extraocular movements intact.      Conjunctiva/sclera: Conjunctivae normal.   Cardiovascular:      Rate and Rhythm: Normal rate and regular rhythm.   Pulmonary:      Effort: Pulmonary effort is normal. No respiratory distress.      Breath sounds: Decreased in bibasilar fields but no wheezes or rales.  Abdominal:      General: Bowel sounds are normal. There is no distension.      Palpations: Abdomen is soft.      Tenderness: There is appropriate postoperative tenderness without guarding or rebound.   Musculoskeletal:         General: No swelling. Normal range of motion.      Cervical back: Normal range of motion and neck supple.   Skin:     General: Skin is warm and dry.      Comments: KERI drain LLQ, lap sites CDI  Neurological:      Mental Status: She is alert and oriented to person, place, and time. Mental status is at baseline.   Psychiatric:         Mood and Affect: Mood normal.         Behavior: Behavior normal.     Severo Valle MD  3/15/2021  17:25 EST                      Patient Name:  Libby Huerta  YOB: 1985  MRN:  [id number]  Date of Admission:  3/14/2021  Date of Consult:  3/15/2021  Patient Care  Team:  Kristan Langford APRN as PCP - General (Internal Medicine)    Consults  Subjective   History of Present Illness  Ms. Huerta is a 35 y.o. female without significant medical history that has been admitted to [facility] following  an elective laparoscopic APPENDECTOMY.  She has been admitted to a telemetry floor following surgery and we were asked to see and assist with her medical problems.  At the time of my visit she denies any chest pain, SOA at rest, vomiting or diarrhea.  She has tolerated a clear liquid diet.  She does complain of expected postoperative discomfort, especially with movement.  She reports being in a normal state of health leading up to surgery. Since surgery she has complaints of shortness of breath on exertion, polydipsia and blood sugars have been elevated, otherwise she has remained stable.       Allergies:  Ceclor [cefaclor]    Review of Systems   Constitutional: Negative for chills and fever.   HENT: Negative for congestion and sore throat.    Eyes: Negative for discharge and itching.   Respiratory: Negative for cough and shortness of breath.    Cardiovascular: Negative for chest pain and palpitations.   Gastrointestinal: Positive for abdominal pain and nausea. Negative for vomiting.   Endocrine: Positive for polydipsia. Negative for cold intolerance and heat intolerance.   Genitourinary: Negative for difficulty urinating and dysuria.   Musculoskeletal: Negative for back pain and gait problem.   Skin: Positive for wound. Negative for color change.   Allergic/Immunologic: Negative for environmental allergies and food allergies.   Neurological: Negative for dizziness and headaches.   Hematological: Negative for adenopathy. Does not bruise/bleed easily.   Psychiatric/Behavioral: Negative for agitation and behavioral problems.     Objective      Vital Signs  Temp:  [97.5 °F (36.4 °C)-103.1 °F (39.5 °C)] 97.5 °F (36.4 °C)  Heart Rate:  [] 85  Resp:  [12-18] 18  BP:  ()/(39-99) 112/74  Body mass index is 32.02 kg/m².    Physical Exam  Vitals and nursing note reviewed.   Constitutional:       Appearance: Normal appearance.   HENT:      Head: Normocephalic and atraumatic.   Eyes:      Extraocular Movements: Extraocular movements intact.      Conjunctiva/sclera: Conjunctivae normal.   Cardiovascular:      Rate and Rhythm: Normal rate and regular rhythm.   Pulmonary:      Effort: Pulmonary effort is normal. No respiratory distress.      Breath sounds: Normal breath sounds.   Abdominal:      General: Bowel sounds are normal. There is no distension.      Palpations: Abdomen is soft.      Tenderness: There is abdominal tenderness.   Musculoskeletal:         General: No swelling. Normal range of motion.      Cervical back: Normal range of motion and neck supple.   Skin:     General: Skin is warm and dry.      Comments: KERI drain LLQ, lap sites CDI, open to air   Neurological:      Mental Status: She is alert and oriented to person, place, and time. Mental status is at baseline.   Psychiatric:         Mood and Affect: Mood normal.         Behavior: Behavior normal.     Results Review:   I reviewed the patient's new clinical results.  I reviewed the patient's new imaging results and agree with the interpretation.  I reviewed the patient's other test results and agree with the interpretation  I personally viewed and interpreted the patient's EKG/Telemetry data      Assessment/Plan     Active Hospital Problems    Diagnosis  POA   • Obesity (BMI 30-39.9) [E66.9]  Yes   • Leukocytosis [D72.829]  Yes   • Elevated random blood glucose level [R73.09]  Yes   • Postoperative hypoxia [R09.02, Z98.890]  Unknown   • Acute appendicitis with localized peritonitis and abscess, without gangrene [K35.33]  Yes       Ms. Huerta is a 35 y.o. female who is s/p APPENDECTOMY LAPAROSCOPIC.    · doing well postoperatively. teixeira catheter removed this afternoon. bladder scan as needed and if >350mL, I&O  cath. continue supportive care with IVFs, analgesics and antiemetics. encourage IS.  Monitor labs, leukocytosis improving, continue Zosyn. Wean o2 as tolerated. tachycardia has improved.  · check a1c as glucose has been running high without a history of DM2 and with complaints of polydipsia.       Thank you very much for asking A to be involved in this patient's care. We will follow along with you.      JOSE Cao  [facility]  03/15/21  16:13 EDT      Electronically signed by Severo Valle MD at 03/16/21 0946     Anatoliy Bai MD at 03/16/21 5377      Consult Orders    1. Inpatient Pulmonology Consult [[id number]] ordered by Socorro Mulligan APRN at 03/16/21 1442                              Referring Provider: JOSE Brown  Reason for Consultation: Abnormal CT chest    Patient Care Team:  Kristan Langford APRN as PCP - General (Internal Medicine)    Chief complaint:   Abdominal pain    History of present illness:  Subjective     This is a 35-year-old female patient, lifelong non-smoker with no prior history of lung disease.    She was admitted to the hospital on 3/14 with abdominal pain and was found to have ruptured appendicitis.  She underwent emergent lap appendectomy.  On CT abdomen on presentation, she had numerous air-filled cystic lesion which prompted a CT of the chest demonstrating the same findings and suspicious for PEÑA.  We were consulted for this matter.     On questioning, patient indicated prior history of asthma and allergy but she has been off inhalers for years.  She also had a history of pneumonia when she was 11 years old according to her mother who was present in the room today.  She did not have any lung imaging recently.  She stated that she has trouble breathing but activities which she attributed to the use of mask during the COVID-19 pandemic and gaining weight but she never was very symptomatic to the point that she needed to be  evaluated from this perspective.  She denies chronic cough or current cough.  She is not aware of any pulmonary disease in her family but she does not know her father's side.  On her mother's sides, there is no lung disease or autoimmune disease or any genetic condition that would stand out.    Patient stated that she was on Depo shots for birth control for 12 years but has been off it for a while.    Labs reviewed: WBC 17 K and hemoglobin 10.  Reviewed the pathology report of the appendix which is consistent with appendicitis.       Review of Systems  Constitutional: No current fever or chills.   ENMT: No sinus congestion  Cardiovascular: No chest pain, palpitation or legs swelling.    Respiratory: No current dyspnea, cough or wheezing.  Gastrointestinal: Mild abdominal pain due to surgery.  She just started eating today.  No nausea or vomiting.  Neurology: No headache, weakness, numbness or dizziness.   Musculoskeletal: No joints pain, stiffness or swelling.   Psychiatry: No depression.  Genitourinary: No dysuria or frequent urination  Endo: No weight changes. No cold or warm intolerance.  Lymphatic: No swollen glands.  Integumentary: No rash.    History  Past Medical History:   Diagnosis Date   • Anxiety    • Asthma     childhood asthma   ,   Past Surgical History:   Procedure Laterality Date   • APPENDECTOMY N/A 3/14/2021    Procedure: APPENDECTOMY LAPAROSCOPIC;  Surgeon: Char Rodriguez MD;  Location: [facility];  Service: General;  Laterality: N/A;   • LASIK     • TONSILLECTOMY     ,   Family History   Problem Relation Age of Onset   • Celiac disease Mother    • Cancer Mother    • Cervical cancer Mother    • Thyroid disease Maternal Grandmother    • Rheum arthritis Maternal Grandmother    • Crohn's disease Maternal Grandfather    ,   Social History     Socioeconomic History   • Marital status: Single     Spouse name: Not on file   • Number of children: Not on file   • Years of education: Not on file   •  Highest education level: Not on file   Tobacco Use   • Smoking status: Former Smoker   • Smokeless tobacco: Never Used   Substance and Sexual Activity   • Alcohol use: Yes     Alcohol/week: 1.0 standard drinks     Types: 1 Glasses of wine per week     Comment: rare   • Drug use: No   • Sexual activity: Yes     Partners: Male       ,   No medications prior to admission.   , Scheduled Meds:  acetaminophen, 1,000 mg, Oral, Q8H  enoxaparin, 40 mg, Subcutaneous, Nightly  ibuprofen, 800 mg, Oral, TID  piperacillin-tazobactam, 3.375 g, Intravenous, Q8H  polyethylene glycol, 17 g, Oral, Daily  sodium chloride, 10 mL, Intravenous, Q12H    , Continuous Infusions:    and Allergies:  Ceclor [cefaclor]    Objective     Vital Signs   Temp:  [97.6 °F (36.4 °C)-98.2 °F (36.8 °C)] 98 °F (36.7 °C)  Heart Rate:  [79-88] 88  Resp:  [18] 18  BP: (104-128)/(73-86) 128/80    Physical Exam:  Constitutional: Not in acute distress.  Eyes: Injected conjunctivae, EOMI. pupils equal reactive to light.  ENMT: Mouth not examined due to presence of mask.  External ears normal.  Neck: Large. Trachea midline. No thyromegaly  Heart: RRR, no murmur  Lungs: Good and equal air entry bilaterally but slightly diminished at the bases.  No crackles.  No wheezing.  Non labored breathing.   Abdomen: Obese. Soft. No tenderness or dullness. No HSM.  Extremities: No cyanosis, clubbing or pitting edema.  Warm extremities and well-perfused.  Neuro: Conscious, alert, oriented x3.  Strength 5/5 in arms.  Psych: Appropriate mood and affect.    Integumentary: No rash.  Normal skin turgor  Lymphatic: No palpable cervical or supraclavicular lymph nodes.      Diagnostic imaging:  I personally and independently reviewed the following images:     CT chest 3/16/21:  Small bilateral pleural effusion and bilateral lower lobe atelectasis.  Multiple cystic lesions in the lungs.        Assessment   6. 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  but does establish the diagnosis without other findings occluding laboratory testing  8. 5 mm RML nodule  9. Small bilateral pleural effusion, likely reactive from appendicitis and sepsis (not present on initial CT 3/14 and therefore not related to the presumed diagnosis of PEÑA).  Does not seem to have extrapulmonary manifestation.  10. Small bilateral lower lobe atelectasis, (not present on initial CT 3/14)  11. Ruptured appendicitis s/p lap appendectomy 3/14/2021  12. Sepsis, skin #6      Recommendations:    · Outpatient testing: Alpha-1 antitrypsin, rheumatoid factor, CHRIS, SSB and SSA, serum and urine protein electrophoresis, VEGF-D- Will also need PFT and serial imaging.     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  She's certainly not a severe case at this point and it is questionable whether she is symptomatic from her disease or no (, suspect that she does not have much symptoms from her condition) and therefore there is no need for any kind of therapy at this point.  She will just need to be monitored serially.  Suggested that she sign up online to PEÑA foundation or other social groups to get help. Will also follow her as outpatient periodically as stated above and if there is suspicion of worsening disease or symptomatic disease then we can refer her to a tertiary care center but again she does not need any of this at the present point and its only an incidental discovery.  Discussed avoiding birth control/hormonal therapy as this can exacerbate the condition    · Agree antibiotics per primary team for sepsis secondary to ruptured appendicitis    Discussed with her mother at bedside      Anatoliy Bai MD  03/16/21  17:40 EDT    Electronically signed by Anatoliy Bai MD at 03/16/21 1920

## 2021-03-18 LAB
ANION GAP SERPL CALCULATED.3IONS-SCNC: 8.9 MMOL/L (ref 5–15)
BASOPHILS # BLD AUTO: 0.05 10*3/MM3 (ref 0–0.2)
BASOPHILS NFR BLD AUTO: 0.3 % (ref 0–1.5)
BUN SERPL-MCNC: 6 MG/DL (ref 6–20)
BUN/CREAT SERPL: 9.5 (ref 7–25)
CALCIUM SPEC-SCNC: 7.6 MG/DL (ref 8.6–10.5)
CHLORIDE SERPL-SCNC: 109 MMOL/L (ref 98–107)
CO2 SERPL-SCNC: 22.1 MMOL/L (ref 22–29)
CREAT SERPL-MCNC: 0.63 MG/DL (ref 0.57–1)
DEPRECATED RDW RBC AUTO: 38.2 FL (ref 37–54)
EOSINOPHIL # BLD AUTO: 0.14 10*3/MM3 (ref 0–0.4)
EOSINOPHIL NFR BLD AUTO: 0.9 % (ref 0.3–6.2)
ERYTHROCYTE [DISTWIDTH] IN BLOOD BY AUTOMATED COUNT: 12 % (ref 12.3–15.4)
GFR SERPL CREATININE-BSD FRML MDRD: 109 ML/MIN/1.73
GLUCOSE SERPL-MCNC: 87 MG/DL (ref 65–99)
HCT VFR BLD AUTO: 31.2 % (ref 34–46.6)
HGB BLD-MCNC: 10.5 G/DL (ref 12–15.9)
IMM GRANULOCYTES # BLD AUTO: 0.31 10*3/MM3 (ref 0–0.05)
IMM GRANULOCYTES NFR BLD AUTO: 2 % (ref 0–0.5)
LYMPHOCYTES # BLD AUTO: 1.58 10*3/MM3 (ref 0.7–3.1)
LYMPHOCYTES NFR BLD AUTO: 10.1 % (ref 19.6–45.3)
MCH RBC QN AUTO: 29.8 PG (ref 26.6–33)
MCHC RBC AUTO-ENTMCNC: 33.7 G/DL (ref 31.5–35.7)
MCV RBC AUTO: 88.6 FL (ref 79–97)
MONOCYTES # BLD AUTO: 1.22 10*3/MM3 (ref 0.1–0.9)
MONOCYTES NFR BLD AUTO: 7.8 % (ref 5–12)
NEUTROPHILS NFR BLD AUTO: 12.29 10*3/MM3 (ref 1.7–7)
NEUTROPHILS NFR BLD AUTO: 78.9 % (ref 42.7–76)
NRBC BLD AUTO-RTO: 0 /100 WBC (ref 0–0.2)
PLATELET # BLD AUTO: 381 10*3/MM3 (ref 140–450)
PMV BLD AUTO: 9.7 FL (ref 6–12)
POTASSIUM SERPL-SCNC: 3.9 MMOL/L (ref 3.5–5.2)
RBC # BLD AUTO: 3.52 10*6/MM3 (ref 3.77–5.28)
SODIUM SERPL-SCNC: 140 MMOL/L (ref 136–145)
WBC # BLD AUTO: 15.59 10*3/MM3 (ref 3.4–10.8)

## 2021-03-18 PROCEDURE — 25010000002 ONDANSETRON PER 1 MG: Performed by: STUDENT IN AN ORGANIZED HEALTH CARE EDUCATION/TRAINING PROGRAM

## 2021-03-18 PROCEDURE — 25010000002 ENOXAPARIN PER 10 MG: Performed by: STUDENT IN AN ORGANIZED HEALTH CARE EDUCATION/TRAINING PROGRAM

## 2021-03-18 PROCEDURE — 80048 BASIC METABOLIC PNL TOTAL CA: CPT | Performed by: STUDENT IN AN ORGANIZED HEALTH CARE EDUCATION/TRAINING PROGRAM

## 2021-03-18 PROCEDURE — 25010000002 PIPERACILLIN SOD-TAZOBACTAM PER 1 G: Performed by: STUDENT IN AN ORGANIZED HEALTH CARE EDUCATION/TRAINING PROGRAM

## 2021-03-18 PROCEDURE — 85025 COMPLETE CBC W/AUTO DIFF WBC: CPT | Performed by: STUDENT IN AN ORGANIZED HEALTH CARE EDUCATION/TRAINING PROGRAM

## 2021-03-18 PROCEDURE — 99024 POSTOP FOLLOW-UP VISIT: CPT | Performed by: STUDENT IN AN ORGANIZED HEALTH CARE EDUCATION/TRAINING PROGRAM

## 2021-03-18 RX ORDER — HYDROCODONE BITARTRATE AND ACETAMINOPHEN 5; 325 MG/1; MG/1
1 TABLET ORAL EVERY 4 HOURS PRN
Status: DISCONTINUED | OUTPATIENT
Start: 2021-03-18 | End: 2021-03-22 | Stop reason: HOSPADM

## 2021-03-18 RX ADMIN — Medication 500 MG: at 08:41

## 2021-03-18 RX ADMIN — IBUPROFEN 800 MG: 800 TABLET, FILM COATED ORAL at 08:35

## 2021-03-18 RX ADMIN — ONDANSETRON 4 MG: 2 INJECTION INTRAMUSCULAR; INTRAVENOUS at 17:36

## 2021-03-18 RX ADMIN — Medication 500 MG: at 20:08

## 2021-03-18 RX ADMIN — ACETAMINOPHEN 1000 MG: 500 TABLET, FILM COATED ORAL at 08:35

## 2021-03-18 RX ADMIN — IBUPROFEN 800 MG: 800 TABLET, FILM COATED ORAL at 20:09

## 2021-03-18 RX ADMIN — SODIUM CHLORIDE 100 ML/HR: 9 INJECTION, SOLUTION INTRAVENOUS at 08:34

## 2021-03-18 RX ADMIN — TAZOBACTAM SODIUM AND PIPERACILLIN SODIUM 3.38 G: 375; 3 INJECTION, SOLUTION INTRAVENOUS at 16:04

## 2021-03-18 RX ADMIN — ENOXAPARIN SODIUM 40 MG: 40 INJECTION SUBCUTANEOUS at 20:08

## 2021-03-18 RX ADMIN — SODIUM CHLORIDE, PRESERVATIVE FREE 10 ML: 5 INJECTION INTRAVENOUS at 20:09

## 2021-03-18 RX ADMIN — SODIUM CHLORIDE, PRESERVATIVE FREE 10 ML: 5 INJECTION INTRAVENOUS at 08:35

## 2021-03-18 RX ADMIN — HYDROCODONE BITARTRATE AND ACETAMINOPHEN 1 TABLET: 5; 325 TABLET ORAL at 18:49

## 2021-03-18 RX ADMIN — TAZOBACTAM SODIUM AND PIPERACILLIN SODIUM 3.38 G: 375; 3 INJECTION, SOLUTION INTRAVENOUS at 23:57

## 2021-03-18 RX ADMIN — TAZOBACTAM SODIUM AND PIPERACILLIN SODIUM 3.38 G: 375; 3 INJECTION, SOLUTION INTRAVENOUS at 08:34

## 2021-03-18 RX ADMIN — IBUPROFEN 800 MG: 800 TABLET, FILM COATED ORAL at 16:04

## 2021-03-18 NOTE — PLAN OF CARE
Goal Outcome Evaluation:  Plan of Care Reviewed With: patient     Outcome Summary: Pt continues to have increased output and saturation from drain. Pt reports feeling chills and shivers, temp of 100.4 after ibuprofen.  Pt reports small amounts of pain in abd.  She has been walking the unit and using incentive spriometer.  Good appetite.  Will continue to monitor.

## 2021-03-18 NOTE — PROGRESS NOTES
"General Surgery Progress Note    Summary:  Mrs. Libby Huerta is a 35 y.o. year old lady POD4 s/p lap appendectomy for ruptured appendicitis. Feels much better. Afebrile, WBC up to 15. Will monitor overnight. If WBC up or febrile, will CT tomorrow.     Interval Events: No acute events overnight. Feels much better. Eating better. Less diarrhea. HR improving.       Vitals: /97 (BP Location: Left arm, Patient Position: Sitting)   Pulse 101   Temp 98.7 °F (37.1 °C) (Oral)   Resp 18   Ht 165.1 cm (65\")   Wt 105 kg (231 lb)   SpO2 95%   BMI 38.44 kg/m²     Drain 225cc    GENERAL: alert, well appearing, and in no distress  HEENT: normocephalic, atraumatic, moist mucous membranes, clear sclerae   CHEST: on room air, no increased work of breathing, symmetric air entry  CARDIAC: regular rate and rhythm    ABDOMEN: Soft, mildly distended, appropriately tender to palpation, KERI with some serous output  EXTREMITIES: no cyanosis, clubbing, or edema   SKIN: Warm and moist, no rashes    Labs:  Results from last 7 days   Lab Units 03/18/21  0425 03/17/21  0455 03/16/21  0424 03/15/21  0439   WBC 10*3/mm3 15.59* 13.82* 19.01* 17.37*   HEMOGLOBIN g/dL 10.5* 11.5* 10.4* 10.8*   HEMATOCRIT % 31.2* 35.3 31.0* 33.0*   PLATELETS 10*3/mm3 381 439 360 319   MONOCYTES % %  --   --   --  3.0*     Results from last 7 days   Lab Units 03/18/21  0425 03/17/21  2042 03/17/21  0455 03/16/21  0424 03/14/21  1654   SODIUM mmol/L 140  --  140 136 134*   POTASSIUM mmol/L 3.9 3.9 3.4* 4.0 4.1   CHLORIDE mmol/L 109*  --  108* 106 99   CO2 mmol/L 22.1  --  21.4* 22.7 23.8   BUN mg/dL 6  --  13 17 13   CREATININE mg/dL 0.63  --  0.92 0.82 1.11*   CALCIUM mg/dL 7.6*  --  7.8* 8.3* 8.9   BILIRUBIN mg/dL  --   --   --   --  0.9   ALK PHOS U/L  --   --   --   --  88   ALT (SGPT) U/L  --   --   --   --  26   AST (SGOT) U/L  --   --   --   --  10   GLUCOSE mg/dL 87  --  72 100* 112*           REYNA LIMON MD  General and Endoscopic " Surgery  [facility]    [address], [address]  [place], [place], [address]  P: [phone]  F: [phone]

## 2021-03-18 NOTE — NURSING NOTE
Rn noted that patient had a full bad of zosyn hanging when rn came into room.  Bag was still clamped, can only assume that she has missed her previous dose.  New bag hung.    KERI drain had approx 100ml output and dressing was saturated.  Pt reported that she went to the bathroom, might have happened with movement after sleeping all night.  Dressing was changed.  Will continue to monitor.

## 2021-03-18 NOTE — PROGRESS NOTES
Name: Libby Huerta ADMIT: 3/14/2021   : 1985  PCP: Kristan Langford APRN    MRN: [id number] LOS: 4 days   AGE/SEX: 35 y.o. female  ROOM: [facility]     Subjective   Subjective   diarrhea has improved, KERI drain is leaking from incision site. remains on room air. pain still pretty intense with movement but better at rest.      Review of Systems   Constitutional: Negative for chills and fever.   Respiratory: Negative for chest tightness and shortness of breath.    Cardiovascular: Negative for chest pain and palpitations.   Gastrointestinal: Positive for abdominal pain. Negative for constipation and diarrhea.   Genitourinary: Negative for difficulty urinating and dysuria.   Musculoskeletal: Negative for back pain and gait problem.   Psychiatric/Behavioral: Negative for agitation and behavioral problems.        Objective   Objective   Vital Signs  Temp:  [98 °F (36.7 °C)-99 °F (37.2 °C)] 98.7 °F (37.1 °C)  Heart Rate:  [] 101  Resp:  [18-20] 18  BP: (132-149)/(79-97) 134/97  SpO2:  [94 %-95 %] 95 %  on   ;   Device (Oxygen Therapy): room air  Body mass index is 38.44 kg/m².  Physical Exam  Vitals and nursing note reviewed.   Constitutional:       Appearance: Normal appearance. She is obese.   Cardiovascular:      Rate and Rhythm: Normal rate and regular rhythm.   Pulmonary:      Effort: Pulmonary effort is normal. No respiratory distress.      Breath sounds: Normal breath sounds.   Abdominal:      General: Bowel sounds are normal. There is no distension.      Tenderness: There is abdominal tenderness.   Skin:     General: Skin is warm and dry.      Comments: KERI drain LLQ   Neurological:      Mental Status: She is alert and oriented to person, place, and time. Mental status is at baseline.         Results Review     I reviewed the patient's new clinical results.  Results from last 7 days   Lab Units 21  0425 21  0455 21  0424 03/15/21  0439   WBC 10*3/mm3 15.59* 13.82* 19.01* 17.37*    HEMOGLOBIN g/dL 10.5* 11.5* 10.4* 10.8*   PLATELETS 10*3/mm3 381 439 360 319     Results from last 7 days   Lab Units 03/18/21  0425 03/17/21  2042 03/17/21  0455 03/16/21  0424 03/15/21  0439   SODIUM mmol/L 140  --  140 136 136   POTASSIUM mmol/L 3.9 3.9 3.4* 4.0 4.4   CHLORIDE mmol/L 109*  --  108* 106 108*   CO2 mmol/L 22.1  --  21.4* 22.7 19.5*   BUN mg/dL 6  --  13 17 11   CREATININE mg/dL 0.63  --  0.92 0.82 0.74   GLUCOSE mg/dL 87  --  72 100* 135*   Estimated Creatinine Clearance: 152.8 mL/min (by C-G formula based on SCr of 0.63 mg/dL).  Results from last 7 days   Lab Units 03/14/21  1654   ALBUMIN g/dL 3.30*   BILIRUBIN mg/dL 0.9   ALK PHOS U/L 88   AST (SGOT) U/L 10   ALT (SGPT) U/L 26     Results from last 7 days   Lab Units 03/18/21  0425 03/17/21 0455 03/16/21  0424 03/15/21  0439 03/14/21  1654   CALCIUM mg/dL 7.6* 7.8* 8.3* 7.9* 8.9   ALBUMIN g/dL  --   --   --   --  3.30*   MAGNESIUM mg/dL  --  2.1  --   --   --      Results from last 7 days   Lab Units 03/14/21  2358 03/14/21  1832 03/14/21  1654   PROCALCITONIN ng/mL  --   --  8.96*   LACTATE mmol/L 0.8 2.3*  --      COVID19   Date Value Ref Range Status   03/14/2021 Not Detected Not Detected - Ref. Range Final     Hemoglobin A1C   Date/Time Value Ref Range Status   03/15/2021 1854 5.20 4.80 - 5.60 % Final       CT Chest Without Contrast Diagnostic  Narrative: CT CHEST WO CONTRAST DIAGNOSTIC-     CLINICAL HISTORY: Follow-up abnormal lung parenchyma partially seen on  CT scan of the abdomen and pelvis     TECHNIQUE: Spiral CT images were obtained through the chest without IV  contrast and were reconstructed in 3 mm thick axial slices.     Radiation dose reduction techniques were utilized, including automated  exposure control and exposure modulation based on body size.     COMPARISON: CT scan of the abdomen and pelvis dated 03/14/2021     FINDINGS: There are numerous well marginated air-containing cysts of  varying size scattered throughout  both lungs. The findings suggest the  possibility of lymphangioleiomyomatosis. No discrete lung masses are  identified. There are curvilinear bands of abnormal increased density  with adjacent pleural thickening located in the posterior aspects of the  right upper and lower lobes and also within the left lower lobe. Focal  pleural-based areas of consolidation are also noted in the posterior  aspects of both lower lobes that are new since the preceding abdomen CT.  These are likely areas of atelectasis. Tiny bilateral pleural effusions  are also new. There is no mediastinal or hilar or axillary  lymphadenopathy. The heart is normal in size. Images through the upper  abdomen show no abnormalities.     Impression: Multiple air containing cysts of varying size scattered  throughout both lungs raising the possibility of  lymphangioleiomyomatosis. No discrete lung masses are identified. There  are curvilinear areas of abnormal increased density scattered throughout  both lungs along with pleural-based areas of consolidation there  consistent with atelectasis. Tiny bilateral posteriorly layering pleural  effusions are present, and are new since a CT scan of the abdomen dated  03/14/2021.     This report was finalized on 3/16/2021 1:54 PM by Dr. Zurdo Perez M.D.       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       continue supportive care with analgesics and antiemetics. encourage IS.  Monitor labs, leukocytosis worse today, continue Zosyn. Weaned to room air. IVFs resumed given tachycardia and may need re-imaging in the next day or 2 to assess for abscess. stop scheduled tylenol and use Norco for better pain control, continue ibuprofen.   HRCT chest ordered given abnormal abd CT findings and demonstrates possible lymphangioleiomyomatosis, pulmonology following and will manage.  checkd a1c as glucose has been running high without a history of DM2. A1c 5.2 discussed lifestyle modifications.    add probiotic and stop miralax to see if this will help with her diarrhea but will need to avoid constipation given narcotic use and recent abdominal surgery.  potassium replaced, mag fine    there is not really much more internal medicine can offer and we will sign off at this time. please reach out should you require our assistance in the future. thank you for allowing [facility] to be a part of this patient's care.          JOSE Cao  [facility]  03/18/21  13:56 EDT

## 2021-03-18 NOTE — PROGRESS NOTES
"                                              LOS: 4 days   Patient Care Team:  Kristan Langford APRN as PCP - General (Internal Medicine)    Chief Complaint:  F/up abnormal CT chest and sepsis    Subjective   Interval History  Had abdominal pain today and flushing.  Developed low-grade fever.      REVIEW OF SYSTEMS:   CARDIOVASCULAR: No chest pain, chest pressure or chest discomfort. No palpitations or edema.   RESPIRATORY: No cough or shortness of breath  .     Ventilator/Non-Invasive Ventilation Settings (From admission, onward) Comment    None                Physical Exam:     Vital Signs  Temp:  [98.2 °F (36.8 °C)-100.9 °F (38.3 °C)] 98.8 °F (37.1 °C)  Heart Rate:  [] 114  Resp:  [18-20] 20  BP: (126-149)/(76-87) 138/79    Intake/Output Summary (Last 24 hours) at 3/18/2021 0000  Last data filed at 3/17/2021 1933  Gross per 24 hour   Intake 890 ml   Output 303 ml   Net 587 ml     Flowsheet Rows      First Filed Value   Admission Height  165.1 cm (65\") Documented at 03/14/2021 1653   Admission Weight  105 kg (231 lb) Documented at 03/16/2021 0911          General Appearance:   Alert, cooperative, in no acute distress   ENMT:  Mallampati score 3, moist mucous membrane   Eyes:  Pupils equal and reactive to light. EOMI   Neck:   Large. Trachea midline. No thyromegaly.   Lungs:    Slightly diminished at the bases with minimal crackles.  No wheezing    Heart:   Regular rhythm and normal rate, normal S1 and S2, no         murmur   Skin:   No rash   Abdomen:    Mild tenderness.  Soft. No HSM.   Neuro:  Conscious, alert, oriented x3. Strength 5/5 in upper and lower  ext   Extremities:  No cyanosis, clubbing or edema.  Warm extremities and well-perfused          Results Review:        Results from last 7 days   Lab Units 03/17/21 2042 03/17/21 0455 03/16/21  0424 03/15/21  0439   SODIUM mmol/L  --  140 136 136   POTASSIUM mmol/L 3.9 3.4* 4.0 4.4   CHLORIDE mmol/L  --  108* 106 108*   CO2 mmol/L  --  21.4* 22.7 " 19.5*   BUN mg/dL  --  13 17 11   CREATININE mg/dL  --  0.92 0.82 0.74   GLUCOSE mg/dL  --  72 100* 135*   CALCIUM mg/dL  --  7.8* 8.3* 7.9*         Results from last 7 days   Lab Units 03/17/21  0455 03/16/21  0424 03/15/21  0439   WBC 10*3/mm3 13.82* 19.01* 17.37*   HEMOGLOBIN g/dL 11.5* 10.4* 10.8*   HEMATOCRIT % 35.3 31.0* 33.0*   PLATELETS 10*3/mm3 439 360 319               I reviewed the patient's new clinical results.        Medication Review:   acetaminophen, 1,000 mg, Oral, Q8H  enoxaparin, 40 mg, Subcutaneous, Nightly  ibuprofen, 800 mg, Oral, TID  piperacillin-tazobactam, 3.375 g, Intravenous, Q8H  saccharomyces boulardii, 500 mg, Oral, BID  sodium chloride, 10 mL, Intravenous, Q12H        sodium chloride, 100 mL/hr, Last Rate: 100 mL/hr (03/17/21 1010)      CT chest 3/16/21:  Small bilateral pleural effusion and bilateral lower lobe atelectasis.  Multiple cystic lesions in the lungs.        Assessment   1. 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  but does establish the diagnosis without other findings occluding laboratory testing  3. 5 mm RML nodule  4. Small bilateral pleural effusion, likely reactive from appendicitis and sepsis (not present on initial CT 3/14 and therefore not related to the presumed diagnosis of PEÑA).  Does not seem to have extrapulmonary manifestation.  5. Small bilateral lower lobe atelectasis, (not present on initial CT 3/14)  6. Ruptured appendicitis s/p lap appendectomy 3/14/2021  7. Sepsis, secondary to #1    Plan     · Outpatient testing: Alpha-1 antitrypsin, rheumatoid factor, CHRIS, SSB and SSA, serum and urine protein electrophoresis, VEGF-D- Will also need PFT and serial imaging.   · IV hydration for sepsis normal saline  · Antibiotics with Zosyn  · DVT prophylaxis  · I-S to prevent atelectasis    Discussed with her mother at bedside    We will follow as needed    Anatoliy Bai MD  03/18/21  00:00  EDT          This note was dictated utilizing Dragon dictation

## 2021-03-18 NOTE — PLAN OF CARE
Problem: Adult Inpatient Plan of Care  Goal: Plan of Care Review  Goal: Absence of Hospital-Acquired Illness or Injury  Intervention: Identify and Manage Fall Risk  Intervention: Prevent Skin Injury  Goal: Optimal Comfort and Wellbeing  Intervention: Provide Person-Centered Care     Problem: Adjustment to Illness (Sepsis/Septic Shock)  Goal: Optimal Coping  Intervention: Optimize Psychosocial Adjustment to Illness     Problem: Respiratory Compromise (Sepsis/Septic Shock)  Goal: Effective Oxygenation and Ventilation  Intervention: Promote Airway Secretion Clearance  Intervention: Optimize Oxygenation and Ventilation     Problem: Ongoing Anesthesia Effects (Appendectomy)  Goal: Anesthesia/Sedation Recovery  Intervention: Optimize Anesthesia Recovery     Problem: Pain (Appendectomy)  Goal: Acceptable Pain Control  Intervention: Prevent or Manage Pain   Goal Outcome Evaluation:        Outcome Summary: KERI drain had very little output only 25 cc as of right now. Pain at a minimum, tylenol scheduled. Patient on PO probiotics for diarrhea. Patient's HR improved after IV hydration. RN encouraged patient to use IS every hour if awake. Patient remains afebrile. Patient did not ambulate during shift, slept most of it. No other complaints

## 2021-03-19 ENCOUNTER — APPOINTMENT (OUTPATIENT)
Dept: CT IMAGING | Facility: HOSPITAL | Age: 36
End: 2021-03-19

## 2021-03-19 LAB
ANION GAP SERPL CALCULATED.3IONS-SCNC: 9.6 MMOL/L (ref 5–15)
BACTERIA SPEC AEROBE CULT: NORMAL
BACTERIA SPEC AEROBE CULT: NORMAL
BASOPHILS # BLD AUTO: 0.05 10*3/MM3 (ref 0–0.2)
BASOPHILS NFR BLD AUTO: 0.3 % (ref 0–1.5)
BUN SERPL-MCNC: 6 MG/DL (ref 6–20)
BUN/CREAT SERPL: 10.3 (ref 7–25)
CALCIUM SPEC-SCNC: 7.7 MG/DL (ref 8.6–10.5)
CHLORIDE SERPL-SCNC: 107 MMOL/L (ref 98–107)
CO2 SERPL-SCNC: 21.4 MMOL/L (ref 22–29)
CREAT SERPL-MCNC: 0.58 MG/DL (ref 0.57–1)
DEPRECATED RDW RBC AUTO: 41.2 FL (ref 37–54)
EOSINOPHIL # BLD AUTO: 0.25 10*3/MM3 (ref 0–0.4)
EOSINOPHIL NFR BLD AUTO: 1.6 % (ref 0.3–6.2)
ERYTHROCYTE [DISTWIDTH] IN BLOOD BY AUTOMATED COUNT: 12.4 % (ref 12.3–15.4)
GFR SERPL CREATININE-BSD FRML MDRD: 120 ML/MIN/1.73
GLUCOSE SERPL-MCNC: 89 MG/DL (ref 65–99)
HCT VFR BLD AUTO: 33.3 % (ref 34–46.6)
HGB BLD-MCNC: 10.9 G/DL (ref 12–15.9)
IMM GRANULOCYTES # BLD AUTO: 0.55 10*3/MM3 (ref 0–0.05)
IMM GRANULOCYTES NFR BLD AUTO: 3.5 % (ref 0–0.5)
INR PPP: 1.18 (ref 0.9–1.1)
LYMPHOCYTES # BLD AUTO: 1.5 10*3/MM3 (ref 0.7–3.1)
LYMPHOCYTES NFR BLD AUTO: 9.7 % (ref 19.6–45.3)
MCH RBC QN AUTO: 29.9 PG (ref 26.6–33)
MCHC RBC AUTO-ENTMCNC: 32.7 G/DL (ref 31.5–35.7)
MCV RBC AUTO: 91.5 FL (ref 79–97)
MONOCYTES # BLD AUTO: 0.91 10*3/MM3 (ref 0.1–0.9)
MONOCYTES NFR BLD AUTO: 5.9 % (ref 5–12)
NEUTROPHILS NFR BLD AUTO: 12.28 10*3/MM3 (ref 1.7–7)
NEUTROPHILS NFR BLD AUTO: 79 % (ref 42.7–76)
NRBC BLD AUTO-RTO: 0 /100 WBC (ref 0–0.2)
PLATELET # BLD AUTO: 397 10*3/MM3 (ref 140–450)
PMV BLD AUTO: 9.8 FL (ref 6–12)
POTASSIUM SERPL-SCNC: 3.8 MMOL/L (ref 3.5–5.2)
PROTHROMBIN TIME: 14.8 SECONDS (ref 11.7–14.2)
RBC # BLD AUTO: 3.64 10*6/MM3 (ref 3.77–5.28)
SODIUM SERPL-SCNC: 138 MMOL/L (ref 136–145)
WBC # BLD AUTO: 15.54 10*3/MM3 (ref 3.4–10.8)

## 2021-03-19 PROCEDURE — 80048 BASIC METABOLIC PNL TOTAL CA: CPT | Performed by: STUDENT IN AN ORGANIZED HEALTH CARE EDUCATION/TRAINING PROGRAM

## 2021-03-19 PROCEDURE — 85025 COMPLETE CBC W/AUTO DIFF WBC: CPT | Performed by: STUDENT IN AN ORGANIZED HEALTH CARE EDUCATION/TRAINING PROGRAM

## 2021-03-19 PROCEDURE — 25010000002 ENOXAPARIN PER 10 MG: Performed by: STUDENT IN AN ORGANIZED HEALTH CARE EDUCATION/TRAINING PROGRAM

## 2021-03-19 PROCEDURE — 75989 ABSCESS DRAINAGE UNDER X-RAY: CPT

## 2021-03-19 PROCEDURE — 74177 CT ABD & PELVIS W/CONTRAST: CPT

## 2021-03-19 PROCEDURE — 85610 PROTHROMBIN TIME: CPT | Performed by: RADIOLOGY

## 2021-03-19 PROCEDURE — 25010000002 LEVOFLOXACIN PER 250 MG: Performed by: STUDENT IN AN ORGANIZED HEALTH CARE EDUCATION/TRAINING PROGRAM

## 2021-03-19 PROCEDURE — 25010000002 IOPAMIDOL 61 % SOLUTION: Performed by: STUDENT IN AN ORGANIZED HEALTH CARE EDUCATION/TRAINING PROGRAM

## 2021-03-19 PROCEDURE — 49406 IMAGE CATH FLUID PERI/RETRO: CPT

## 2021-03-19 PROCEDURE — 25010000002 PIPERACILLIN SOD-TAZOBACTAM PER 1 G: Performed by: STUDENT IN AN ORGANIZED HEALTH CARE EDUCATION/TRAINING PROGRAM

## 2021-03-19 PROCEDURE — 99024 POSTOP FOLLOW-UP VISIT: CPT | Performed by: STUDENT IN AN ORGANIZED HEALTH CARE EDUCATION/TRAINING PROGRAM

## 2021-03-19 RX ORDER — LEVOFLOXACIN 5 MG/ML
750 INJECTION, SOLUTION INTRAVENOUS EVERY 24 HOURS
Status: DISCONTINUED | OUTPATIENT
Start: 2021-03-19 | End: 2021-03-22 | Stop reason: HOSPADM

## 2021-03-19 RX ADMIN — TAZOBACTAM SODIUM AND PIPERACILLIN SODIUM 3.38 G: 375; 3 INJECTION, SOLUTION INTRAVENOUS at 17:37

## 2021-03-19 RX ADMIN — HYDROCODONE BITARTRATE AND ACETAMINOPHEN 1 TABLET: 5; 325 TABLET ORAL at 09:02

## 2021-03-19 RX ADMIN — SODIUM CHLORIDE 100 ML/HR: 9 INJECTION, SOLUTION INTRAVENOUS at 07:32

## 2021-03-19 RX ADMIN — IBUPROFEN 800 MG: 800 TABLET, FILM COATED ORAL at 09:02

## 2021-03-19 RX ADMIN — LEVOFLOXACIN 750 MG: 5 INJECTION, SOLUTION INTRAVENOUS at 21:46

## 2021-03-19 RX ADMIN — METRONIDAZOLE 500 MG: 500 INJECTION, SOLUTION INTRAVENOUS at 21:45

## 2021-03-19 RX ADMIN — Medication 500 MG: at 09:02

## 2021-03-19 RX ADMIN — HYDROCODONE BITARTRATE AND ACETAMINOPHEN 1 TABLET: 5; 325 TABLET ORAL at 04:40

## 2021-03-19 RX ADMIN — HYDROCODONE BITARTRATE AND ACETAMINOPHEN 1 TABLET: 5; 325 TABLET ORAL at 16:50

## 2021-03-19 RX ADMIN — IOPAMIDOL 85 ML: 612 INJECTION, SOLUTION INTRAVENOUS at 08:06

## 2021-03-19 RX ADMIN — IBUPROFEN 800 MG: 800 TABLET, FILM COATED ORAL at 20:05

## 2021-03-19 RX ADMIN — TAZOBACTAM SODIUM AND PIPERACILLIN SODIUM 3.38 G: 375; 3 INJECTION, SOLUTION INTRAVENOUS at 09:03

## 2021-03-19 RX ADMIN — SODIUM CHLORIDE, PRESERVATIVE FREE 10 ML: 5 INJECTION INTRAVENOUS at 20:06

## 2021-03-19 RX ADMIN — Medication 500 MG: at 20:05

## 2021-03-19 RX ADMIN — SODIUM CHLORIDE, PRESERVATIVE FREE 10 ML: 5 INJECTION INTRAVENOUS at 09:03

## 2021-03-19 RX ADMIN — HYDROCODONE BITARTRATE AND ACETAMINOPHEN 1 TABLET: 5; 325 TABLET ORAL at 13:13

## 2021-03-19 RX ADMIN — ENOXAPARIN SODIUM 40 MG: 40 INJECTION SUBCUTANEOUS at 20:05

## 2021-03-19 RX ADMIN — IBUPROFEN 800 MG: 800 TABLET, FILM COATED ORAL at 17:37

## 2021-03-19 NOTE — PLAN OF CARE
Goal Outcome Evaluation:  Plan of Care Reviewed With: patient     Outcome Summary: Pt continues to have the same amount of drainage today and saturate the dressing on her negrita drain, dressing was changed approx 3 times today.  Pt continues to have abd pain.  CT found abcess, but was unable to drain at this time.  Pt will have stronger abx.  Continue to monitor.

## 2021-03-19 NOTE — PLAN OF CARE
Goal Outcome Evaluation:     Minimal c/o pain. VSS on room air. Low grade fever last night. Incision site is draining a small amount of serous fluid.  KERI drain in place. Ambulating to bathroom.  Currently resting in bed. Will continue to monitor VS and incision site.

## 2021-03-19 NOTE — PROGRESS NOTES
"General Surgery Progress Note    Summary:  Mrs. Libby Huerta is a 35 y.o. year old lady POD5 s/p lap appendectomy for ruptured appendicitis. Feeling better. CT with pelvic abscess, febrile, WBC 15 from 15. Will ask IR to see if it is drainable.    Interval Events: No acute events overnight. Feels about the same. Temperature overnight. WBC 15 still. No nausea or vomiting.    Vitals: /87 (BP Location: Left arm, Patient Position: Lying)   Pulse 106   Temp 99 °F (37.2 °C) (Oral)   Resp 18   Ht 165.1 cm (65\")   Wt 105 kg (231 lb)   SpO2 93%   BMI 38.44 kg/m²     Drain 75cc    GENERAL: alert, well appearing, and in no distress  HEENT: normocephalic, atraumatic, moist mucous membranes, clear sclerae   CHEST: on room air, no increased work of breathing, symmetric air entry  CARDIAC: regular rate and rhythm    ABDOMEN: Soft, non-distended, appropriately tender to palpation, KERI with some sero-purulent output  EXTREMITIES: no cyanosis, clubbing, or edema   SKIN: Warm and moist, no rashes    Labs:  Results from last 7 days   Lab Units 03/19/21  0353 03/18/21  0425 03/17/21  0455 03/15/21  0439   WBC 10*3/mm3 15.54* 15.59* 13.82* 17.37*   HEMOGLOBIN g/dL 10.9* 10.5* 11.5* 10.8*   HEMATOCRIT % 33.3* 31.2* 35.3 33.0*   PLATELETS 10*3/mm3 397 381 439 319   MONOCYTES % %  --   --   --  3.0*     Results from last 7 days   Lab Units 03/19/21  0353 03/18/21  0425 03/17/21  2042 03/17/21  0455 03/14/21  1654   SODIUM mmol/L 138 140  --  140 134*   POTASSIUM mmol/L 3.8 3.9 3.9 3.4* 4.1   CHLORIDE mmol/L 107 109*  --  108* 99   CO2 mmol/L 21.4* 22.1  --  21.4* 23.8   BUN mg/dL 6 6  --  13 13   CREATININE mg/dL 0.58 0.63  --  0.92 1.11*   CALCIUM mg/dL 7.7* 7.6*  --  7.8* 8.9   BILIRUBIN mg/dL  --   --   --   --  0.9   ALK PHOS U/L  --   --   --   --  88   ALT (SGPT) U/L  --   --   --   --  26   AST (SGOT) U/L  --   --   --   --  10   GLUCOSE mg/dL 89 87  --  72 112*           REYNA LIMON MD  General and Endoscopic " Surgery  [facility]    [address], [address]  [place], [place], [address]  P: [phone]  F: [phone]

## 2021-03-19 NOTE — NURSING NOTE
Dr. Zuñiga unable to drain abcess due to positioning/ vascularity of abcess. No sedation given to patient. Vitals stable for entirety of stay in radiology. RN informed unable to complete procedure at this time.

## 2021-03-20 LAB
ANION GAP SERPL CALCULATED.3IONS-SCNC: 9.8 MMOL/L (ref 5–15)
BASOPHILS # BLD AUTO: 0.05 10*3/MM3 (ref 0–0.2)
BASOPHILS NFR BLD AUTO: 0.4 % (ref 0–1.5)
BUN SERPL-MCNC: 7 MG/DL (ref 6–20)
BUN/CREAT SERPL: 12.3 (ref 7–25)
CALCIUM SPEC-SCNC: 8.4 MG/DL (ref 8.6–10.5)
CHLORIDE SERPL-SCNC: 103 MMOL/L (ref 98–107)
CO2 SERPL-SCNC: 25.2 MMOL/L (ref 22–29)
CREAT SERPL-MCNC: 0.57 MG/DL (ref 0.57–1)
DEPRECATED RDW RBC AUTO: 38.6 FL (ref 37–54)
EOSINOPHIL # BLD AUTO: 0.23 10*3/MM3 (ref 0–0.4)
EOSINOPHIL NFR BLD AUTO: 1.8 % (ref 0.3–6.2)
ERYTHROCYTE [DISTWIDTH] IN BLOOD BY AUTOMATED COUNT: 11.9 % (ref 12.3–15.4)
GFR SERPL CREATININE-BSD FRML MDRD: 122 ML/MIN/1.73
GLUCOSE SERPL-MCNC: 73 MG/DL (ref 65–99)
HCT VFR BLD AUTO: 32.6 % (ref 34–46.6)
HGB BLD-MCNC: 10.7 G/DL (ref 12–15.9)
IMM GRANULOCYTES # BLD AUTO: 0.64 10*3/MM3 (ref 0–0.05)
IMM GRANULOCYTES NFR BLD AUTO: 4.9 % (ref 0–0.5)
LYMPHOCYTES # BLD AUTO: 1.86 10*3/MM3 (ref 0.7–3.1)
LYMPHOCYTES NFR BLD AUTO: 14.3 % (ref 19.6–45.3)
MCH RBC QN AUTO: 29.2 PG (ref 26.6–33)
MCHC RBC AUTO-ENTMCNC: 32.8 G/DL (ref 31.5–35.7)
MCV RBC AUTO: 88.8 FL (ref 79–97)
MONOCYTES # BLD AUTO: 0.8 10*3/MM3 (ref 0.1–0.9)
MONOCYTES NFR BLD AUTO: 6.1 % (ref 5–12)
NEUTROPHILS NFR BLD AUTO: 72.5 % (ref 42.7–76)
NEUTROPHILS NFR BLD AUTO: 9.47 10*3/MM3 (ref 1.7–7)
NRBC BLD AUTO-RTO: 0 /100 WBC (ref 0–0.2)
PLATELET # BLD AUTO: 521 10*3/MM3 (ref 140–450)
PMV BLD AUTO: 9.7 FL (ref 6–12)
POTASSIUM SERPL-SCNC: 3.6 MMOL/L (ref 3.5–5.2)
RBC # BLD AUTO: 3.67 10*6/MM3 (ref 3.77–5.28)
SODIUM SERPL-SCNC: 138 MMOL/L (ref 136–145)
WBC # BLD AUTO: 13.05 10*3/MM3 (ref 3.4–10.8)

## 2021-03-20 PROCEDURE — 25010000002 HYDROMORPHONE PER 4 MG: Performed by: STUDENT IN AN ORGANIZED HEALTH CARE EDUCATION/TRAINING PROGRAM

## 2021-03-20 PROCEDURE — 25010000002 ENOXAPARIN PER 10 MG: Performed by: STUDENT IN AN ORGANIZED HEALTH CARE EDUCATION/TRAINING PROGRAM

## 2021-03-20 PROCEDURE — 25010000002 LEVOFLOXACIN PER 250 MG: Performed by: STUDENT IN AN ORGANIZED HEALTH CARE EDUCATION/TRAINING PROGRAM

## 2021-03-20 PROCEDURE — 80048 BASIC METABOLIC PNL TOTAL CA: CPT | Performed by: STUDENT IN AN ORGANIZED HEALTH CARE EDUCATION/TRAINING PROGRAM

## 2021-03-20 PROCEDURE — 85025 COMPLETE CBC W/AUTO DIFF WBC: CPT | Performed by: STUDENT IN AN ORGANIZED HEALTH CARE EDUCATION/TRAINING PROGRAM

## 2021-03-20 PROCEDURE — 25010000002 ONDANSETRON PER 1 MG: Performed by: STUDENT IN AN ORGANIZED HEALTH CARE EDUCATION/TRAINING PROGRAM

## 2021-03-20 PROCEDURE — 99024 POSTOP FOLLOW-UP VISIT: CPT | Performed by: SURGERY

## 2021-03-20 RX ORDER — HYDRALAZINE HYDROCHLORIDE 10 MG/1
10 TABLET, FILM COATED ORAL EVERY 8 HOURS PRN
Status: DISCONTINUED | OUTPATIENT
Start: 2021-03-20 | End: 2021-03-22 | Stop reason: HOSPADM

## 2021-03-20 RX ADMIN — Medication 500 MG: at 09:33

## 2021-03-20 RX ADMIN — IBUPROFEN 800 MG: 800 TABLET, FILM COATED ORAL at 17:07

## 2021-03-20 RX ADMIN — METRONIDAZOLE 500 MG: 500 INJECTION, SOLUTION INTRAVENOUS at 01:48

## 2021-03-20 RX ADMIN — ENOXAPARIN SODIUM 40 MG: 40 INJECTION SUBCUTANEOUS at 21:31

## 2021-03-20 RX ADMIN — IBUPROFEN 800 MG: 800 TABLET, FILM COATED ORAL at 21:31

## 2021-03-20 RX ADMIN — Medication 500 MG: at 21:31

## 2021-03-20 RX ADMIN — IBUPROFEN 800 MG: 800 TABLET, FILM COATED ORAL at 09:34

## 2021-03-20 RX ADMIN — HYDROMORPHONE HYDROCHLORIDE 0.5 MG: 1 INJECTION, SOLUTION INTRAMUSCULAR; INTRAVENOUS; SUBCUTANEOUS at 17:08

## 2021-03-20 RX ADMIN — SODIUM CHLORIDE, PRESERVATIVE FREE 10 ML: 5 INJECTION INTRAVENOUS at 21:31

## 2021-03-20 RX ADMIN — SODIUM CHLORIDE 100 ML/HR: 9 INJECTION, SOLUTION INTRAVENOUS at 14:46

## 2021-03-20 RX ADMIN — METRONIDAZOLE 500 MG: 500 INJECTION, SOLUTION INTRAVENOUS at 12:04

## 2021-03-20 RX ADMIN — LEVOFLOXACIN 750 MG: 5 INJECTION, SOLUTION INTRAVENOUS at 18:22

## 2021-03-20 RX ADMIN — SODIUM CHLORIDE, PRESERVATIVE FREE 10 ML: 5 INJECTION INTRAVENOUS at 09:34

## 2021-03-20 RX ADMIN — METRONIDAZOLE 500 MG: 500 INJECTION, SOLUTION INTRAVENOUS at 17:39

## 2021-03-20 RX ADMIN — ONDANSETRON 4 MG: 2 INJECTION INTRAMUSCULAR; INTRAVENOUS at 17:08

## 2021-03-20 NOTE — PLAN OF CARE
"  Problem: Adult Inpatient Plan of Care  Goal: Plan of Care Review  Goal: Absence of Hospital-Acquired Illness or Injury  Intervention: Identify and Manage Fall Risk  Intervention: Prevent Skin Injury  Goal: Optimal Comfort and Wellbeing  Intervention: Provide Person-Centered Care     Problem: Adjustment to Illness (Sepsis/Septic Shock)  Goal: Optimal Coping  Intervention: Optimize Psychosocial Adjustment to Illness     Problem: Respiratory Compromise (Sepsis/Septic Shock)  Goal: Effective Oxygenation and Ventilation  Intervention: Promote Airway Secretion Clearance  Intervention: Optimize Oxygenation and Ventilation     Problem: Ongoing Anesthesia Effects (Appendectomy)  Goal: Anesthesia/Sedation Recovery  Intervention: Optimize Anesthesia Recovery     Problem: Pain (Appendectomy)  Goal: Acceptable Pain Control  Intervention: Prevent or Manage Pain   Goal Outcome Evaluation:        Outcome Summary: Flagyl and Levauqin started, tolerated well. Minimal abd pain, did not want to take any medication for it. Patient did c/o \"flank pain\" that was felt in mid back, she states it almost feels like a gas pain. RN told patient if it did not go away to tell her. Pain resolved. Balbir drain put out approximately 100 cc. Temperature ran 99.5 tonight, no complaints of chills. Patient is tachy with activity. VS stable otherwise. Patient wants to know if she can take a shower today, was told by a Dr she could per the patient. No orders to do so.  "

## 2021-03-20 NOTE — PROGRESS NOTES
Chief Complaint:    POD 5, S/P laparoscopic appendectomy for perforated appendicitis    Subjective:    Feels better today  Concerned about hypertension on the monitor.    Objective:    Vitals:    03/20/21 0014 03/20/21 0732 03/20/21 1428 03/20/21 1455   BP: 158/97 156/99 (!) 159/103 158/100   BP Location: Left arm Left arm Left arm Left arm   Patient Position: Lying Lying Lying Lying   Pulse: 89 81 97    Resp: 16 16 16    Temp: 99.5 °F (37.5 °C) 99.5 °F (37.5 °C) 98.2 °F (36.8 °C)    TempSrc: Oral Oral Oral    SpO2: 97% 95% 97%    Weight:       Height:         Drain output serosanguineous character.  275 mL output for the last 24 hours.  Lungs: Clear  Heart: Regular  Abdomen: Not distended. BS present.   Extremities: Warm    Labs reviewed.  WBC 13.05, Hgb 10.7, platelets 521.  Creatinine 0.57.    Assessment:    POD 5, S/P laparoscopic appendectomy for perforated appendicitis    Plan:    Looks better today.  On Levaquin and Flagyl  Decrease IV fluids.

## 2021-03-20 NOTE — PLAN OF CARE
Problem: Adult Inpatient Plan of Care  Goal: Plan of Care Review  Outcome: Ongoing, Progressing  Flowsheets (Taken 3/20/2021 1859)  Progress: improving  Plan of Care Reviewed With: patient  Outcome Summary: Pt ambulated independently around nurse station. Dilaudid given q1 for pain. Zofran given q1 for nausea. Increased BP, notified MD Santillan, new order for PRN hydrolazine. Pt tachycardic in the afternoon. KERI drain output fair. Will continue to monitor  Goal: Patient-Specific Goal (Individualized)  Outcome: Ongoing, Progressing  Goal: Absence of Hospital-Acquired Illness or Injury  Outcome: Ongoing, Progressing  Intervention: Identify and Manage Fall Risk  Recent Flowsheet Documentation  Taken 3/20/2021 1707 by Ese Walden RN  Safety Promotion/Fall Prevention:   safety round/check completed   room organization consistent   nonskid shoes/slippers when out of bed   fall prevention program maintained   clutter free environment maintained   assistive device/personal items within reach   activity supervised  Taken 3/20/2021 1448 by Ese Walden RN  Safety Promotion/Fall Prevention:   safety round/check completed   room organization consistent   nonskid shoes/slippers when out of bed   fall prevention program maintained   clutter free environment maintained   assistive device/personal items within reach   activity supervised  Taken 3/20/2021 1205 by Ese Walden, RN  Safety Promotion/Fall Prevention:   safety round/check completed   room organization consistent   nonskid shoes/slippers when out of bed   clutter free environment maintained   assistive device/personal items within reach   activity supervised  Taken 3/20/2021 0934 by Ese Walden, RN  Safety Promotion/Fall Prevention:   safety round/check completed   room organization consistent   nonskid shoes/slippers when out of bed   fall prevention program maintained   clutter free environment maintained   activity supervised  Intervention:  Prevent Skin Injury  Recent Flowsheet Documentation  Taken 3/20/2021 1448 by Ese Walden RN  Body Position:   supine   position changed independently  Taken 3/20/2021 1205 by Ese Walden RN  Body Position: (up in chair)   position changed independently   other (see comments)  Taken 3/20/2021 0934 by Ese Walden RN  Body Position: position changed independently  Intervention: Prevent Infection  Recent Flowsheet Documentation  Taken 3/20/2021 1448 by Ese Walden RN  Infection Prevention:   visitors restricted/screened   single patient room provided   rest/sleep promoted   hand hygiene promoted  Goal: Optimal Comfort and Wellbeing  Outcome: Ongoing, Progressing  Intervention: Provide Person-Centered Care  Recent Flowsheet Documentation  Taken 3/20/2021 0934 by Ese Walden RN  Trust Relationship/Rapport:   care explained   emotional support provided   questions encouraged   questions answered   reassurance provided   thoughts/feelings acknowledged  Goal: Readiness for Transition of Care  Outcome: Ongoing, Progressing     Problem: Adjustment to Illness (Sepsis/Septic Shock)  Goal: Optimal Coping  Outcome: Ongoing, Progressing  Intervention: Optimize Psychosocial Adjustment to Illness  Recent Flowsheet Documentation  Taken 3/20/2021 1448 by Ese Walden RN  Supportive Measures:   active listening utilized   self-care encouraged  Taken 3/20/2021 0934 by Ese Walden RN  Supportive Measures:   active listening utilized   self-care encouraged     Problem: Bleeding (Sepsis/Septic Shock)  Goal: Absence of Bleeding  Outcome: Ongoing, Progressing  Intervention: Minimize Bleeding Risk  Recent Flowsheet Documentation  Taken 3/20/2021 0934 by Ese Walden RN  Bleeding Precautions: monitored for signs of bleeding  Bleeding Management: dressing monitored     Problem: Glycemic Control Impaired (Sepsis/Septic Shock)  Goal: Blood Glucose Level Within Desired Range  Outcome:  Ongoing, Progressing  Intervention: Optimize Glycemic Control  Recent Flowsheet Documentation  Taken 3/20/2021 1448 by Ese Walden RN  Glycemic Management: oral hydration promoted  Taken 3/20/2021 0934 by Ese Walden RN  Glycemic Management: oral hydration promoted     Problem: Hemodynamic Instability (Sepsis/Septic Shock)  Goal: Effective Tissue Perfusion  Outcome: Ongoing, Progressing  Intervention: Optimize Blood Flow  Recent Flowsheet Documentation  Taken 3/20/2021 1448 by Ese Walden RN  Fluid/Electrolyte Management: fluids provided  Taken 3/20/2021 0934 by Ese Walden RN  Fluid/Electrolyte Management: fluids provided     Problem: Infection (Sepsis/Septic Shock)  Goal: Absence of Infection Signs and Symptoms  Outcome: Ongoing, Progressing  Intervention: Prevent or Manage Infection Progression  Recent Flowsheet Documentation  Taken 3/20/2021 1448 by Ese Walden RN  Infection Prevention:   visitors restricted/screened   single patient room provided   rest/sleep promoted   hand hygiene promoted     Problem: Nutrition Impaired (Sepsis/Septic Shock)  Goal: Optimal Nutrition Intake  Outcome: Ongoing, Progressing     Problem: Respiratory Compromise (Sepsis/Septic Shock)  Goal: Effective Oxygenation and Ventilation  Outcome: Ongoing, Progressing  Intervention: Promote Airway Secretion Clearance  Recent Flowsheet Documentation  Taken 3/20/2021 1521 by Ese Walden RN  Activity Management: up ad romy  Taken 3/20/2021 1448 by Ese Walden RN  Activity Management:   up ad romy   activity adjusted per tolerance  Breathing Techniques/Airway Clearance: deep/controlled cough encouraged  Taken 3/20/2021 0934 by Ese Walden RN  Activity Management:   up ad rmoy   activity adjusted per tolerance  Breathing Techniques/Airway Clearance: deep/controlled cough encouraged  Cough And Deep Breathing: done independently per patient  Intervention: Optimize Oxygenation and  Ventilation  Recent Flowsheet Documentation  Taken 3/20/2021 1448 by Ese Walden RN  Head of Bed (HOB): HOB at 30-45 degrees  Taken 3/20/2021 1205 by Ese Walden RN  Head of Bed (HOB): HOB at 30-45 degrees  Taken 3/20/2021 0934 by Ese Walden RN  Head of Bed (HOB): HOB at 30-45 degrees     Problem: Bleeding (Appendectomy)  Goal: Absence of Bleeding  Outcome: Ongoing, Progressing  Intervention: Monitor and Manage Bleeding  Recent Flowsheet Documentation  Taken 3/20/2021 0934 by Ese Walden RN  Bleeding Management: dressing monitored     Problem: Bowel Elimination Impaired (Appendectomy)  Goal: Effective Bowel Elimination  Outcome: Ongoing, Progressing     Problem: Infection (Appendectomy)  Goal: Absence of Infection Signs and Symptoms  Outcome: Ongoing, Progressing     Problem: Ongoing Anesthesia Effects (Appendectomy)  Goal: Anesthesia/Sedation Recovery  Outcome: Ongoing, Progressing  Intervention: Optimize Anesthesia Recovery  Recent Flowsheet Documentation  Taken 3/20/2021 1707 by Ese Walden RN  Safety Promotion/Fall Prevention:   safety round/check completed   room organization consistent   nonskid shoes/slippers when out of bed   fall prevention program maintained   clutter free environment maintained   assistive device/personal items within reach   activity supervised  Taken 3/20/2021 1448 by Ese Walden RN  Safety Promotion/Fall Prevention:   safety round/check completed   room organization consistent   nonskid shoes/slippers when out of bed   fall prevention program maintained   clutter free environment maintained   assistive device/personal items within reach   activity supervised  Reorientation Measures: clock in view  Taken 3/20/2021 1205 by Ese Walden RN  Safety Promotion/Fall Prevention:   safety round/check completed   room organization consistent   nonskid shoes/slippers when out of bed   clutter free environment maintained   assistive  device/personal items within reach   activity supervised  Taken 3/20/2021 0934 by Ese Walden RN  Safety Promotion/Fall Prevention:   safety round/check completed   room organization consistent   nonskid shoes/slippers when out of bed   fall prevention program maintained   clutter free environment maintained   activity supervised  Reorientation Measures: clock in view     Problem: Pain (Appendectomy)  Goal: Acceptable Pain Control  Outcome: Ongoing, Progressing  Intervention: Prevent or Manage Pain  Recent Flowsheet Documentation  Taken 3/20/2021 1707 by Ese Walden RN  Pain Management Interventions:   pain management plan reviewed with patient/caregiver   see MAR  Taken 3/20/2021 0934 by Ese Walden RN  Pain Management Interventions:   pain management plan reviewed with patient/caregiver   see MAR     Problem: Postoperative Nausea and Vomiting (Appendectomy)  Goal: Nausea and Vomiting Relief  Outcome: Ongoing, Progressing     Problem: Postoperative Urinary Retention (Appendectomy)  Goal: Effective Urinary Elimination  Outcome: Ongoing, Progressing   Goal Outcome Evaluation:  Plan of Care Reviewed With: patient  Progress: improving  Outcome Summary: Pt ambulated independently around nurse station. Dilaudid given q1 for pain. Zofran given q1 for nausea. Increased BP, notified MD Santillan, new order for PRN hydrolazine. Pt tachycardic in the afternoon. KERI drain output fair. Will continue to monitor

## 2021-03-21 LAB
ANION GAP SERPL CALCULATED.3IONS-SCNC: 7.7 MMOL/L (ref 5–15)
BASOPHILS # BLD AUTO: 0.04 10*3/MM3 (ref 0–0.2)
BASOPHILS NFR BLD AUTO: 0.3 % (ref 0–1.5)
BUN SERPL-MCNC: 7 MG/DL (ref 6–20)
BUN/CREAT SERPL: 11.3 (ref 7–25)
CALCIUM SPEC-SCNC: 8 MG/DL (ref 8.6–10.5)
CHLORIDE SERPL-SCNC: 106 MMOL/L (ref 98–107)
CO2 SERPL-SCNC: 24.3 MMOL/L (ref 22–29)
CREAT SERPL-MCNC: 0.62 MG/DL (ref 0.57–1)
DEPRECATED RDW RBC AUTO: 40.9 FL (ref 37–54)
EOSINOPHIL # BLD AUTO: 0.21 10*3/MM3 (ref 0–0.4)
EOSINOPHIL NFR BLD AUTO: 1.8 % (ref 0.3–6.2)
ERYTHROCYTE [DISTWIDTH] IN BLOOD BY AUTOMATED COUNT: 12.2 % (ref 12.3–15.4)
GFR SERPL CREATININE-BSD FRML MDRD: 111 ML/MIN/1.73
GLUCOSE SERPL-MCNC: 93 MG/DL (ref 65–99)
HCT VFR BLD AUTO: 30.2 % (ref 34–46.6)
HGB BLD-MCNC: 10 G/DL (ref 12–15.9)
IMM GRANULOCYTES # BLD AUTO: 0.44 10*3/MM3 (ref 0–0.05)
IMM GRANULOCYTES NFR BLD AUTO: 3.7 % (ref 0–0.5)
LYMPHOCYTES # BLD AUTO: 1.56 10*3/MM3 (ref 0.7–3.1)
LYMPHOCYTES NFR BLD AUTO: 13.3 % (ref 19.6–45.3)
MCH RBC QN AUTO: 30.2 PG (ref 26.6–33)
MCHC RBC AUTO-ENTMCNC: 33.1 G/DL (ref 31.5–35.7)
MCV RBC AUTO: 91.2 FL (ref 79–97)
MONOCYTES # BLD AUTO: 0.79 10*3/MM3 (ref 0.1–0.9)
MONOCYTES NFR BLD AUTO: 6.7 % (ref 5–12)
NEUTROPHILS NFR BLD AUTO: 74.2 % (ref 42.7–76)
NEUTROPHILS NFR BLD AUTO: 8.72 10*3/MM3 (ref 1.7–7)
NRBC BLD AUTO-RTO: 0 /100 WBC (ref 0–0.2)
PLATELET # BLD AUTO: 460 10*3/MM3 (ref 140–450)
PMV BLD AUTO: 9.5 FL (ref 6–12)
POTASSIUM SERPL-SCNC: 4.5 MMOL/L (ref 3.5–5.2)
RBC # BLD AUTO: 3.31 10*6/MM3 (ref 3.77–5.28)
SODIUM SERPL-SCNC: 138 MMOL/L (ref 136–145)
WBC # BLD AUTO: 11.76 10*3/MM3 (ref 3.4–10.8)

## 2021-03-21 PROCEDURE — 85025 COMPLETE CBC W/AUTO DIFF WBC: CPT | Performed by: STUDENT IN AN ORGANIZED HEALTH CARE EDUCATION/TRAINING PROGRAM

## 2021-03-21 PROCEDURE — 99024 POSTOP FOLLOW-UP VISIT: CPT | Performed by: SURGERY

## 2021-03-21 PROCEDURE — 80048 BASIC METABOLIC PNL TOTAL CA: CPT | Performed by: STUDENT IN AN ORGANIZED HEALTH CARE EDUCATION/TRAINING PROGRAM

## 2021-03-21 PROCEDURE — 25010000002 ONDANSETRON PER 1 MG: Performed by: STUDENT IN AN ORGANIZED HEALTH CARE EDUCATION/TRAINING PROGRAM

## 2021-03-21 PROCEDURE — 25010000002 ENOXAPARIN PER 10 MG: Performed by: STUDENT IN AN ORGANIZED HEALTH CARE EDUCATION/TRAINING PROGRAM

## 2021-03-21 PROCEDURE — 25010000002 LEVOFLOXACIN PER 250 MG: Performed by: STUDENT IN AN ORGANIZED HEALTH CARE EDUCATION/TRAINING PROGRAM

## 2021-03-21 RX ADMIN — HYDROCODONE BITARTRATE AND ACETAMINOPHEN 1 TABLET: 5; 325 TABLET ORAL at 02:08

## 2021-03-21 RX ADMIN — Medication 500 MG: at 22:32

## 2021-03-21 RX ADMIN — HYDRALAZINE HYDROCHLORIDE 10 MG: 10 TABLET, FILM COATED ORAL at 04:07

## 2021-03-21 RX ADMIN — SODIUM CHLORIDE, PRESERVATIVE FREE 10 ML: 5 INJECTION INTRAVENOUS at 10:35

## 2021-03-21 RX ADMIN — IBUPROFEN 800 MG: 800 TABLET, FILM COATED ORAL at 08:46

## 2021-03-21 RX ADMIN — SODIUM CHLORIDE, PRESERVATIVE FREE 10 ML: 5 INJECTION INTRAVENOUS at 22:32

## 2021-03-21 RX ADMIN — METRONIDAZOLE 500 MG: 500 INJECTION, SOLUTION INTRAVENOUS at 10:35

## 2021-03-21 RX ADMIN — SODIUM CHLORIDE 60 ML/HR: 9 INJECTION, SOLUTION INTRAVENOUS at 08:47

## 2021-03-21 RX ADMIN — Medication 500 MG: at 08:46

## 2021-03-21 RX ADMIN — LEVOFLOXACIN 750 MG: 5 INJECTION, SOLUTION INTRAVENOUS at 16:39

## 2021-03-21 RX ADMIN — IBUPROFEN 800 MG: 800 TABLET, FILM COATED ORAL at 15:05

## 2021-03-21 RX ADMIN — IBUPROFEN 800 MG: 800 TABLET, FILM COATED ORAL at 22:31

## 2021-03-21 RX ADMIN — METRONIDAZOLE 500 MG: 500 INJECTION, SOLUTION INTRAVENOUS at 02:05

## 2021-03-21 RX ADMIN — METRONIDAZOLE 500 MG: 500 INJECTION, SOLUTION INTRAVENOUS at 18:35

## 2021-03-21 RX ADMIN — ENOXAPARIN SODIUM 40 MG: 40 INJECTION SUBCUTANEOUS at 22:32

## 2021-03-21 RX ADMIN — ONDANSETRON 4 MG: 2 INJECTION INTRAMUSCULAR; INTRAVENOUS at 18:35

## 2021-03-21 NOTE — PROGRESS NOTES
Chief Complaint:    POD 6, S/P laparoscopic appendectomy for perforated appendicitis    Subjective:    Feels better today    Objective:    Vitals:    03/21/21 0403 03/21/21 0600 03/21/21 0750 03/21/21 1247   BP: 135/97 137/85 126/99 126/89   BP Location: Left arm Right arm Left arm Left arm   Patient Position: Lying Lying Sitting Sitting   Pulse:   89 105   Resp:   18 18   Temp:   97.4 °F (36.3 °C) 97.7 °F (36.5 °C)   TempSrc:   Oral Oral   SpO2:   95% 97%   Weight:       Height:         Drain output serosanguineous character.  80 mL output for the last 24 hours.  Lungs: Clear  Heart: Regular  Abdomen: Not distended. BS present.   Extremities: Warm    Labs reviewed.  WBC 11.76, Hgb 10.0, platelets 460.  Creatinine 0.62.    Assessment:    POD 6, S/P laparoscopic appendectomy for perforated appendicitis    Plan:    Looks better today.  On Levaquin and Flagyl  Probably able to go home tomorrow.

## 2021-03-21 NOTE — PLAN OF CARE
Problem: Adult Inpatient Plan of Care  Goal: Plan of Care Review  Goal: Absence of Hospital-Acquired Illness or Injury  Intervention: Identify and Manage Fall Risk  Intervention: Prevent Skin Injury  Goal: Optimal Comfort and Wellbeing  Intervention: Provide Person-Centered Care     Problem: Adjustment to Illness (Sepsis/Septic Shock)  Goal: Optimal Coping  Intervention: Optimize Psychosocial Adjustment to Illness     Problem: Respiratory Compromise (Sepsis/Septic Shock)  Goal: Effective Oxygenation and Ventilation  Intervention: Promote Airway Secretion Clearance  Intervention: Optimize Oxygenation and Ventilation     Problem: Ongoing Anesthesia Effects (Appendectomy)  Goal: Anesthesia/Sedation Recovery  Intervention: Optimize Anesthesia Recovery     Problem: Pain (Appendectomy)  Goal: Acceptable Pain Control  Intervention: Prevent or Manage Pain   Goal Outcome Evaluation:        Outcome Summary: Patient ambulated around the nurses station, hydralazine PRN given once tonight for BP of 137/91, see orders. Patient recieved Norco for headache that had no relief with tylenol. Patient was also experiencing some abdominal pain after activity. IVF decreased to 60, Levaquin and Flagyl cont. No other changes

## 2021-03-21 NOTE — PLAN OF CARE
Goal Outcome Evaluation:  Plan of Care Reviewed With: patient  Progress: improving  Outcome Summary: Ambulated around nursing unit with her mother,& performs her own ADLs. Is hopeful for discharge to home soon. VSS. Denies pain >2 today. IVF infusing. KERI drain LLQ with some leaking today, dressing changed. However, pt is tachycardic after ambulating in room, and has remained Afebrile this shift.

## 2021-03-22 ENCOUNTER — READMISSION MANAGEMENT (OUTPATIENT)
Dept: CALL CENTER | Facility: HOSPITAL | Age: 36
End: 2021-03-22

## 2021-03-22 VITALS
SYSTOLIC BLOOD PRESSURE: 133 MMHG | HEART RATE: 84 BPM | HEIGHT: 65 IN | DIASTOLIC BLOOD PRESSURE: 79 MMHG | RESPIRATION RATE: 16 BRPM | BODY MASS INDEX: 38.49 KG/M2 | OXYGEN SATURATION: 95 % | WEIGHT: 231 LBS | TEMPERATURE: 98.3 F

## 2021-03-22 LAB
ANION GAP SERPL CALCULATED.3IONS-SCNC: 8.2 MMOL/L (ref 5–15)
BASOPHILS # BLD AUTO: 0.04 10*3/MM3 (ref 0–0.2)
BASOPHILS NFR BLD AUTO: 0.3 % (ref 0–1.5)
BUN SERPL-MCNC: 7 MG/DL (ref 6–20)
BUN/CREAT SERPL: 11.7 (ref 7–25)
CALCIUM SPEC-SCNC: 8.3 MG/DL (ref 8.6–10.5)
CHLORIDE SERPL-SCNC: 107 MMOL/L (ref 98–107)
CO2 SERPL-SCNC: 22.8 MMOL/L (ref 22–29)
CREAT SERPL-MCNC: 0.6 MG/DL (ref 0.57–1)
DEPRECATED RDW RBC AUTO: 39 FL (ref 37–54)
EOSINOPHIL # BLD AUTO: 0.21 10*3/MM3 (ref 0–0.4)
EOSINOPHIL NFR BLD AUTO: 1.8 % (ref 0.3–6.2)
ERYTHROCYTE [DISTWIDTH] IN BLOOD BY AUTOMATED COUNT: 12.2 % (ref 12.3–15.4)
GFR SERPL CREATININE-BSD FRML MDRD: 115 ML/MIN/1.73
GLUCOSE SERPL-MCNC: 91 MG/DL (ref 65–99)
HCT VFR BLD AUTO: 32.1 % (ref 34–46.6)
HGB BLD-MCNC: 10.4 G/DL (ref 12–15.9)
IMM GRANULOCYTES # BLD AUTO: 0.23 10*3/MM3 (ref 0–0.05)
IMM GRANULOCYTES NFR BLD AUTO: 2 % (ref 0–0.5)
LYMPHOCYTES # BLD AUTO: 1.59 10*3/MM3 (ref 0.7–3.1)
LYMPHOCYTES NFR BLD AUTO: 13.8 % (ref 19.6–45.3)
MCH RBC QN AUTO: 28.7 PG (ref 26.6–33)
MCHC RBC AUTO-ENTMCNC: 32.4 G/DL (ref 31.5–35.7)
MCV RBC AUTO: 88.7 FL (ref 79–97)
MONOCYTES # BLD AUTO: 0.61 10*3/MM3 (ref 0.1–0.9)
MONOCYTES NFR BLD AUTO: 5.3 % (ref 5–12)
NEUTROPHILS NFR BLD AUTO: 76.8 % (ref 42.7–76)
NEUTROPHILS NFR BLD AUTO: 8.83 10*3/MM3 (ref 1.7–7)
NRBC BLD AUTO-RTO: 0 /100 WBC (ref 0–0.2)
PLATELET # BLD AUTO: 626 10*3/MM3 (ref 140–450)
PMV BLD AUTO: 9.6 FL (ref 6–12)
POTASSIUM SERPL-SCNC: 4 MMOL/L (ref 3.5–5.2)
RBC # BLD AUTO: 3.62 10*6/MM3 (ref 3.77–5.28)
SODIUM SERPL-SCNC: 138 MMOL/L (ref 136–145)
WBC # BLD AUTO: 11.51 10*3/MM3 (ref 3.4–10.8)

## 2021-03-22 PROCEDURE — 80048 BASIC METABOLIC PNL TOTAL CA: CPT | Performed by: STUDENT IN AN ORGANIZED HEALTH CARE EDUCATION/TRAINING PROGRAM

## 2021-03-22 PROCEDURE — 85025 COMPLETE CBC W/AUTO DIFF WBC: CPT | Performed by: STUDENT IN AN ORGANIZED HEALTH CARE EDUCATION/TRAINING PROGRAM

## 2021-03-22 RX ORDER — ONDANSETRON 4 MG/1
4 TABLET, FILM COATED ORAL DAILY PRN
Qty: 30 TABLET | Refills: 1 | Status: SHIPPED | OUTPATIENT
Start: 2021-03-22 | End: 2021-04-06

## 2021-03-22 RX ORDER — METRONIDAZOLE 500 MG/1
500 TABLET ORAL 3 TIMES DAILY
Qty: 21 TABLET | Refills: 0 | Status: SHIPPED | OUTPATIENT
Start: 2021-03-22 | End: 2021-04-06

## 2021-03-22 RX ORDER — LEVOFLOXACIN 750 MG/1
750 TABLET ORAL DAILY
Qty: 7 TABLET | Refills: 0 | Status: SHIPPED | OUTPATIENT
Start: 2021-03-22 | End: 2021-03-29

## 2021-03-22 RX ORDER — HYDROCODONE BITARTRATE AND ACETAMINOPHEN 5; 325 MG/1; MG/1
1 TABLET ORAL EVERY 6 HOURS PRN
Qty: 10 TABLET | Refills: 0 | Status: SHIPPED | OUTPATIENT
Start: 2021-03-22 | End: 2021-04-27

## 2021-03-22 RX ADMIN — METRONIDAZOLE 500 MG: 500 INJECTION, SOLUTION INTRAVENOUS at 02:09

## 2021-03-22 RX ADMIN — Medication 500 MG: at 09:23

## 2021-03-22 RX ADMIN — METRONIDAZOLE 500 MG: 500 INJECTION, SOLUTION INTRAVENOUS at 11:17

## 2021-03-22 RX ADMIN — HYDROCODONE BITARTRATE AND ACETAMINOPHEN 1 TABLET: 5; 325 TABLET ORAL at 02:10

## 2021-03-22 RX ADMIN — IBUPROFEN 800 MG: 800 TABLET, FILM COATED ORAL at 09:23

## 2021-03-22 RX ADMIN — HYDRALAZINE HYDROCHLORIDE 10 MG: 10 TABLET, FILM COATED ORAL at 00:44

## 2021-03-22 RX ADMIN — SODIUM CHLORIDE 60 ML/HR: 9 INJECTION, SOLUTION INTRAVENOUS at 09:24

## 2021-03-22 NOTE — PLAN OF CARE
Goal Outcome Evaluation:        Pt A & 0 x 4. Pt independent with ADLs. Pt ambulated x 2 around twice, and has been up in chair in room. Performs own ADLs. Pt reports minimal amount of pain at drain site. 40 cc out in KERI drain, with minimal leakage on dressing. NSR, RA, no edema, all pulses palpable. Poor po diet. NSF with IVAB until discharge. Discharge orders received. No surgical note as of this time.

## 2021-03-22 NOTE — PLAN OF CARE
Problem: Adult Inpatient Plan of Care  Goal: Plan of Care Review  Goal: Absence of Hospital-Acquired Illness or Injury  Intervention: Identify and Manage Fall Risk  Intervention: Prevent Skin Injury  Goal: Optimal Comfort and Wellbeing  Intervention: Provide Person-Centered Care     Problem: Adjustment to Illness (Sepsis/Septic Shock)  Goal: Optimal Coping  Intervention: Optimize Psychosocial Adjustment to Illness     Problem: Respiratory Compromise (Sepsis/Septic Shock)  Goal: Effective Oxygenation and Ventilation  Intervention: Promote Airway Secretion Clearance     Problem: Ongoing Anesthesia Effects (Appendectomy)  Goal: Anesthesia/Sedation Recovery  Intervention: Optimize Anesthesia Recovery     Problem: Pain (Appendectomy)  Goal: Acceptable Pain Control  Intervention: Prevent or Manage Pain   Goal Outcome Evaluation:        Outcome Summary: ambulated around nurses station a few times. Hydralazine given once for Diastolic greater than 90. KERI drain only had output of 10. C/o pain in her abdomen after getting up to the restroom given [place] which helped. VSS, RA, no other changes

## 2021-03-22 NOTE — PROGRESS NOTES
Continued Stay Note  [facility]     Patient Name: Libby Huerta  MRN: [id number]  Today's Date: 3/22/2021    Admit Date: 3/14/2021    Discharge Plan     Row Name 03/22/21 1654       Plan    Plan  Home with significant other    Plan Comments  Met with patient and discussed dc needs.  She plans home with her significant other to assist and transport.  She denies any needs.  She prefers to use meds to beds.  .............................Gladis Li RN        Discharge Codes    No documentation.       Expected Discharge Date and Time     Expected Discharge Date Expected Discharge Time    Mar 22, 2021             Gladis Li RN

## 2021-03-22 NOTE — OUTREACH NOTE
Prep Survey      Responses   [facility] patient discharged from?  [place]   Is LACE score < 7 ?  No   Emergency Room discharge w/ pulse ox?  No   Eligibility  [facility]   Date of Admission  03/14/21   Date of Discharge  03/22/21   Discharge Disposition  Home or Self Care   Discharge diagnosis  sepsis d/t acute ruptured appendicitis, Lap appy   Does the patient have one of the following disease processes/diagnoses(primary or secondary)?  Sepsis   Does the patient have Home health ordered?  No   Is there a DME ordered?  No   Prep survey completed?  Yes          Donna Zuñiga RN

## 2021-03-23 ENCOUNTER — TRANSITIONAL CARE MANAGEMENT TELEPHONE ENCOUNTER (OUTPATIENT)
Dept: CALL CENTER | Facility: HOSPITAL | Age: 36
End: 2021-03-23

## 2021-03-23 NOTE — OUTREACH NOTE
Call Center TCM Note      Responses   [facility] patient discharged from?  [place]   Does the patient have one of the following disease processes/diagnoses(primary or secondary)?  Sepsis   TCM attempt successful?  Yes   Call start time  1724   Call end time  1726   Discharge diagnosis  sepsis d/t acute ruptured appendicitis, Lap appy   Does the patient have all medications related to this admission filled (includes all antibiotics, inhalers, nebulizers,steroids,etc.)  Yes   Is the patient taking all medications as directed (includes completed medication regime)?  Yes   Does the patient have a primary care provider?   Yes   Does the patient have an appointment with their PCP within 7 days of discharge?  N/A   Has the patient kept scheduled appointments due by today?  N/A   Psychosocial issues?  No   Did the patient receive a copy of their discharge instructions?  Yes   Nursing interventions  Reviewed instructions with patient   What is the patient's perception of their health status since discharge?  Improving   Nursing interventions  Nurse provided patient education   Is the patient/caregiver able to teach back Sepsis?  S - Shivering,fever or very cold, E - Extreme pain or generalized discomfort (worst ever,especially abdomen), P - Pale or discolored skin, S - Sleepy, difficult to arouse,confused, I -   I feel like I might die-a feeling of hopelessness, S - Short of breath   Is the patient/caregiver able to teach back signs and symptoms of worsening condition:  Fever   Is the patient/caregiver able to teach back the hierarchy of who to call/visit for symptoms/problems? PCP, Specialist, Home health nurse, Urgent Care, ED, 911  Yes   TCM call completed?  Yes   Wrap up additional comments  States she is doing well, no questions or concerns at this time, she declined to make a f/u with PCP during call and states she does not know right now what dates would be best for her.          Nicolle Yanes,  RN    3/23/2021, 17:27 EDT

## 2021-03-23 NOTE — DISCHARGE SUMMARY
DATE OF ADMISSION:  3/14/2021  DATE OF DISCHARGE:  3/22/2021    ATTENDING:        Char Rodriguez M.D.       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  She had severe ruptured appendicitis with a large amount of purulent material throughout her abdomen.  This was irrigated and suctioned as much as possible.  Postoperatively she was admitted for IV antibiotics and ileus.  Her bowel function slowly returned as her diet was advanced.  She did have intermittent fevers and a rising leukocytosis a repeat CT scan was done.  This showed a small abscess in her pelvis.  This was attempted to be drained by IR but could not be reachable due to the intestine.  This was treated with IV antibiotics.  On further IV antibiotics, her white blood cell count normalized and she became afebrile.  She was discharged home on a week of p.o. antibiotics.  Her drain was removed prior to discharge.    ACTIVITY:  Okay to shower.  Ambulate and climb stairs as tolerated.  No lifting over 10 lbs for 2 weeks.  Okay to drive if not taking pain medications.    DIET:  Regular    FOLLOW UP:  With Dr Rodriguez in 2 weeks. Instructed to call [phone] for an appointment.

## 2021-03-23 NOTE — PAYOR COMM NOTE
"Janelle Huerta (35 y.o. Female)    ATTN:  DISCHARGE SUMMARY FOR REVIEW: [id number]     DEPT: [phone],  [phone]          Date of Birth Social Security Number Address Home Phone MRN    1985  [id number] [facility] [address] [phone] [id number]    [employer] Marital Status          Unknown Single       Admission Date Admission Type Admitting Provider Attending Provider Department, Room/Bed    3/14/21 Emergency Char Rodriguez MD  [address], S414/1    Discharge Date Discharge Disposition Discharge Destination        3/22/2021 Home or Self Care              Attending Provider: (none)   Allergies: Ceclor [Cefaclor]    Isolation: None   Infection: None   Code Status: Prior    Ht: 165.1 cm (65\")   Wt: 105 kg (231 lb)    Admission Cmt: None   Principal Problem: Acute appendicitis with localized peritonitis and abscess, without gangrene [K35.33]                 Active Insurance as of 3/14/2021     Primary Coverage     Payor Plan Insurance Group Employer/Plan Group    [facility] COMMUNITY PLAN [facility] PLAN [facility]      Payor Plan Address Payor Plan Phone Number Payor Plan Fax Number Effective Dates    [address]   3/14/2021 - None Entered    [facility] [id number]       Subscriber Name Subscriber Birth Date Member ID       JANELLE HUERTA 1985 [id number]                 Emergency Contacts      (Rel.) Home Phone Work Phone Mobile Phone    Bar Self (Significant Other) [phone] -- --    Maia Padron (Mother) -- -- [phone]               Discharge Summary      Char Rodriguez MD at 03/22/21 1758        DATE OF ADMISSION:  3/14/2021  DATE OF DISCHARGE:  3/22/2021    ATTENDING:        Char Rodriguez M.D.       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  She had severe ruptured appendicitis with a large amount of purulent material throughout her abdomen.  This was irrigated and suctioned as much as possible.  Postoperatively she was admitted for IV antibiotics and ileus.  Her bowel function slowly returned as her diet was advanced.  She did have intermittent fevers and a rising leukocytosis a repeat CT scan was done.  This showed a small abscess in her pelvis.  This was attempted to be drained by IR but could not be reachable due to the intestine.  This was treated with IV antibiotics.  On further IV antibiotics, her white blood cell count normalized and she became afebrile.  She was discharged home on a week of p.o. antibiotics.  Her drain was removed prior to discharge.    ACTIVITY:  Okay to shower.  Ambulate and climb stairs as tolerated.  No lifting over 10 lbs for 2 weeks.  Okay to drive if not taking pain medications.    DIET:  Regular    FOLLOW UP:  With Dr Rodriguez in 2 weeks. Instructed to call [phone] for an appointment.      Electronically signed by Char Rodriguez MD at 03/23/21 8970                 Gladis Li, RN      Case Management   Progress Notes   Signed   Date of Service:  03/22/21 1656   Creation Time:  03/22/21 1656            Signed             Show:Clear all  []Manual[x]Template[]Copied    Added by:  [x]Gladis Li, RN    [][facility] for details  Continued Stay Note  [facility]     Patient Name: Libby Huerta                   MRN: [id number]  Today's Date: 3/22/2021                     Admit Date: 3/14/2021          Discharge Plan      Row Name 03/22/21 1654           Plan     Plan  Home with significant other     Plan Comments  Met with patient and discussed dc needs.  She plans home with her significant other to assist and  transport.  She denies any needs.  She prefers to use meds to beds.  .............................Gladis Li RN          Discharge Codes    No documentation.              Expected Discharge Date and Time      Expected Discharge Date Expected Discharge Time     Mar 22, 2021                  Gladis Li RN

## 2021-03-24 NOTE — PROGRESS NOTES
Case Management Discharge Note      Final Note: Home with significant other    Provided Post Acute Provider List?: N/A  Provided Post Acute Provider Quality & Resource List?: N/A    Selected Continued Care - Discharged on 3/22/2021 Admission date: 3/14/2021 - Discharge disposition: Home or Self Care    Destination    No services have been selected for the patient.              Durable Medical Equipment    No services have been selected for the patient.              Dialysis/Infusion    No services have been selected for the patient.              Home Medical Care    No services have been selected for the patient.              Therapy    No services have been selected for the patient.              Community Resources    No services have been selected for the patient.                  Transportation Services  Private: Car    Final Discharge Disposition Code: 01 - home or self-care

## 2021-03-28 ENCOUNTER — NURSE TRIAGE (OUTPATIENT)
Dept: CALL CENTER | Facility: HOSPITAL | Age: 36
End: 2021-03-28

## 2021-03-28 NOTE — TELEPHONE ENCOUNTER
Caller states that she just noticed drainage from her incision at her navel.  She had a lap appy 3/14.  Denies fever, redness or pain.  Instructed her to call her surgeon in the am and let them know.      Reason for Disposition  • [1] Caller has NON-URGENT question AND [2] triager unable to answer question    Additional Information  • Negative: [1] Major abdominal surgical incision AND [2] wound gaping open AND [3] visible internal organs  • Negative: Sounds like a life-threatening emergency to the triager  • Negative: [1] Bleeding from incision AND [2] won't stop after 10 minutes of direct pressure  • Negative: [1] Widespread rash AND [2] bright red, sunburn-like  • Negative: Severe pain in the incision  • Negative: [1] Incision gaping open AND [2] < 48 hours since wound re-opened  • Negative: [1] Incision gaping open AND [2] length of opening > 2 inches (5 cm)  • Negative: Patient sounds very sick or weak to the triager  • Negative: Sounds like a serious complication to the triager  • Negative: Fever > 100.4 F (38.0 C)  • Negative: [1] Incision looks infected (spreading redness, pain) AND [2] fever > 99.5 F (37.5 C)  • Negative: [1] Incision looks infected (spreading redness, pain) AND [2] large red area (> 2 in. or 5 cm)  • Negative: [1] Incision looks infected (spreading redness, pain) AND [2] face wound  • Negative: [1] Red streak runs from the incision AND [2] longer than 1 inch (2.5 cm)  • Negative: [1] Pus or bad-smelling fluid draining from incision AND [2] no fever  • Negative: [1] Post-op pain AND [2] not controlled with pain medications  • Negative: Dressing soaked with blood or body fluid (e.g., drainage)  • Negative: [1] Raised bruise and [2] size > 2 inches (5 cm) and expanding  • Negative: [1] Caller has URGENT question AND [2] triager unable to answer question  • Negative: [1] INCREASING pain in incision AND [2] > 2 days (48 hours) since surgery  • Negative: [1] Small red area or streak AND [2] no  "fever  • Negative: [1] Clear or blood-tinged fluid draining from wound AND [2] no fever  • Negative: [1] Incision gaping open AND [2] > 48 hours since wound re-opened AND [3] length of opening > 1/2 inch (12 mm)  • Negative: [1] Incision on face gaping open after skin glue AND [2] > 48 hours since wound re-opened AND [3] length of opening > 1/4 inch (6 mm)  • Negative: Suture or staple removal is overdue  • Negative: [1] Suture or staple came out early AND [2] caller wants wound checked    Answer Assessment - Initial Assessment Questions  1. SYMPTOM: \"What's the main symptom you're concerned about?\" (e.g., redness, pain, drainage)      drainage  2. ONSET: \"When did drainage  start?\"      today  3. SURGERY: \"What surgery was performed?\"      appy  4. DATE of SURGERY: \"When was surgery performed?\"       3/14  5. INCISION SITE: \"Where is the incision located?\"       navel  6. REDNESS: \"Is there any redness at the incision site?\" If yes, ask: \"How wide across is the redness?\" (Inches, centimeters)       no  7. PAIN: \"Is there any pain?\" If so, ask: \"How bad is it?\"  (Scale 1-10; or mild, moderate, severe)      no  8. BLEEDING: \"Is there any bleeding?\" If so, ask: \"How much?\" and \"Where?\"      Drainage looks like watered down blood  9. DRAINAGE: \"Is there any drainage from the incision site?\" If yes, ask: \"What color and how much?\" (e.g., red, cloudy, pus; drops, teaspoon)      Small amount  10. FEVER: \"Do you have a fever?\" If so, ask: \"What is your temperature, how was it measured, and when did it start?\"        *No Answer*  11. OTHER SYMPTOMS: \"Do you have any other symptoms?\" (e.g., shaking chills, weakness, rash elsewhere on body)        *No Answer*    Protocols used: POST-OP INCISION SYMPTOMS AND QUESTIONS-[facility] later [facility]      "

## 2021-03-31 ENCOUNTER — READMISSION MANAGEMENT (OUTPATIENT)
Dept: CALL CENTER | Facility: HOSPITAL | Age: 36
End: 2021-03-31

## 2021-03-31 NOTE — OUTREACH NOTE
Sepsis Week 2 Survey      Responses   [facility] patient discharged from?  [place]   Does the patient have one of the following disease processes/diagnoses(primary or secondary)?  Sepsis   Week 2 attempt successful?  Yes   Call start time  1613   Call end time  1613   Discharge diagnosis  sepsis d/t acute ruptured appendicitis, Lap appy   Meds reviewed with patient/caregiver?  Yes   Is the patient having any side effects they believe may be caused by any medication additions or changes?  No   Does the patient have all medications related to this admission filled (includes all antibiotics, inhalers, nebulizers,steroids,etc.)  Yes   Is the patient taking all medications as directed (includes completed medication regime)?  Yes   Does the patient have a primary care provider?   Yes   Does the patient have an appointment with their PCP within 7 days of discharge?  Yes   Has the patient kept scheduled appointments due by today?  Yes   Psychosocial issues?  No   Did the patient receive a copy of their discharge instructions?  Yes   Nursing interventions  Reviewed instructions with patient, Educated on MyChart   What is the patient's perception of their health status since discharge?  Improving   Nursing interventions  Nurse provided patient education   Is the patient/caregiver able to teach back Sepsis?  S - Shivering,fever or very cold, E - Extreme pain or generalized discomfort (worst ever,especially abdomen), P - Pale or discolored skin, S - Sleepy, difficult to arouse,confused, I -   I feel like I might die-a feeling of hopelessness, S - Short of breath   Nursing interventions  Nurse provided reassurance to patient   Is patient/caregiver able to teach back steps to recovery at home?  Set small, achievable goals for return to baseline health, Rest and regain strength   Is the patient/caregiver able to teach back signs and symptoms of worsening condition:  Fever, Hyperthermia   If the patient is a current  smoker, are they able to teach back resources for cessation?  Not a smoker   Is the patient/caregiver able to teach back the hierarchy of who to call/visit for symptoms/problems? PCP, Specialist, Home health nurse, Urgent Care, ED, 911  Yes   Week 2 call completed?  Yes          Mayra Evangelista RN

## 2021-04-06 ENCOUNTER — OFFICE VISIT (OUTPATIENT)
Dept: SURGERY | Facility: CLINIC | Age: 36
End: 2021-04-06

## 2021-04-06 VITALS — WEIGHT: 231 LBS | BODY MASS INDEX: 38.49 KG/M2 | HEIGHT: 65 IN

## 2021-04-06 DIAGNOSIS — K35.20 ACUTE APPENDICITIS WITH PERFORATION, GENERALIZED PERITONITIS, AND GANGRENE, WITHOUT ABSCESS: Primary | ICD-10-CM

## 2021-04-06 PROCEDURE — 99024 POSTOP FOLLOW-UP VISIT: CPT | Performed by: STUDENT IN AN ORGANIZED HEALTH CARE EDUCATION/TRAINING PROGRAM

## 2021-04-07 ENCOUNTER — READMISSION MANAGEMENT (OUTPATIENT)
Dept: CALL CENTER | Facility: HOSPITAL | Age: 36
End: 2021-04-07

## 2021-04-07 NOTE — OUTREACH NOTE
Sepsis Week 3 Survey      Responses   [facility] patient discharged from?  [place]   Does the patient have one of the following disease processes/diagnoses(primary or secondary)?  Sepsis   Week 3 attempt successful?  No   Unsuccessful attempts  Attempt 1          Claudia Nicolas LPN

## 2021-04-08 ENCOUNTER — READMISSION MANAGEMENT (OUTPATIENT)
Dept: CALL CENTER | Facility: HOSPITAL | Age: 36
End: 2021-04-08

## 2021-04-08 NOTE — PROGRESS NOTES
General Surgery Post-Operative Follow Up Note     Summary:    Mrs. Libby Huerta is a 35 y.o. year old lady here for post-operative follow up from laparoscopic appendectomy for ruptured appendicitis.  Doing well with no acute issues.  She will follow-up with pulmonology in the future, says she may get a second opinion.  Follow-up as needed.    History of Present Illness:    Mrs. Libby Huerta is a 35 y.o. year old lady here for post-operative follow up.  She had severe ruptured appendicitis.  She is complete her antibiotic course.  She has had tolerating a diet very well.  She is having normal bowel function.  She has had an uneventful postoperative course at home.    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  She would like a second opinion on this.  She states she will find someone or will contact me for a referral in the next several months.    Procedure:    • 3/14/2021 laparoscopic appendectomy     Pathology:    Final Diagnosis   1. Appendix, Appendectomy:                     A. Acute necrotizing appendicitis, acute serositis and periappendicitis.     Physical Exam:   • Constitutional: Well-developed well-nourished, no acute distress  • Eyes: Conjunctiva normal, sclera nonicteric  • ENMT: Hearing grossly normal, oral mucosa moist  • Respiratory: No increased work of breathing, normal inspiratory effort  • Cardiovascular: Regular rate, no peripheral edema, no jugular venous distention  • Gastrointestinal: Soft, nontender, incision sites clean and dry, no signs of infection  • Skin:  Warm, dry, no rash on visualized skin surfaces  • Musculoskeletal: Symmetric strength, normal gait  • Psychiatric: Alert and oriented ×3, normal affect     Char Rodriguez M.D.  General and Endoscopic Surgery  [facility]    [address], [address]  [place], [place], [address]  P: [phone]  F: [phone]

## 2021-04-08 NOTE — OUTREACH NOTE
Sepsis Week 3 Survey      Responses   [facility] patient discharged from?  [place]   Does the patient have one of the following disease processes/diagnoses(primary or secondary)?  Sepsis   Week 3 attempt successful?  No   Unsuccessful attempts  Attempt 2          Mady Herrera LPN

## 2021-04-16 ENCOUNTER — BULK ORDERING (OUTPATIENT)
Dept: CASE MANAGEMENT | Facility: OTHER | Age: 36
End: 2021-04-16

## 2021-04-16 DIAGNOSIS — Z23 IMMUNIZATION DUE: ICD-10-CM

## 2021-04-26 ENCOUNTER — TELEPHONE (OUTPATIENT)
Dept: SURGERY | Facility: CLINIC | Age: 36
End: 2021-04-26

## 2021-04-26 NOTE — TELEPHONE ENCOUNTER
Patient called this morning with concern that her umbilical incision has opened up s/p lap appy 3/14/21. Reports the surgical glue fell of yesterday finally. She then realized she had an opening at her incision site with some serous drainage. The drainage does not have an odor. No fever, no worsening redness or heat to touch. Patient already has an appt for tomorrow with Dr. Rodriguez. Patient sent the below photo via [facility]:          Showed the below photo to Dr. Rodriguez. Informed the patient that she likely had a seroma. When the glue came off the fluid was then able to drain out. Patient does not need to keep her appt unless she feels the need to be seen. Patient would still like to come into the office just for a check regardless.

## 2021-04-27 ENCOUNTER — OFFICE VISIT (OUTPATIENT)
Dept: SURGERY | Facility: CLINIC | Age: 36
End: 2021-04-27

## 2021-04-27 DIAGNOSIS — K35.20 ACUTE APPENDICITIS WITH PERFORATION, GENERALIZED PERITONITIS, AND GANGRENE, WITHOUT ABSCESS: Primary | ICD-10-CM

## 2021-04-27 PROCEDURE — 99024 POSTOP FOLLOW-UP VISIT: CPT | Performed by: STUDENT IN AN ORGANIZED HEALTH CARE EDUCATION/TRAINING PROGRAM

## 2021-04-27 NOTE — PROGRESS NOTES
General Surgery Post-Operative Follow Up Note     Summary:    Mrs. Libby Huerta is a 35 y.o. year old lady here for post-operative follow up from laparoscopic appendectomy for ruptured appendicitis.  Here with small opening of her supraumbilical incision.  Already closed with serous output.  No signs of infection, no need for antibiotics.  Continue local wound care.  Follow-up as needed.    History of Present Illness:    Mrs. Libby Huerta is a 35 y.o. year old lady here for post-operative follow up.  She is done well.  She is eating and having normal bowel function.  No fevers or chills.  She did have her supraumbilical incision opened just a little bit.  She has been doing local wound care and this closed overnight.  Some serous drainage.    Procedure:    • 3/14/2021 laparoscopic appendectomy     Pathology:    Final Diagnosis   1. Appendix, Appendectomy:                     A. Acute necrotizing appendicitis, acute serositis and periappendicitis.     Physical Exam:   • Constitutional: Well-developed well-nourished, no acute distress  • Eyes: Conjunctiva normal, sclera nonicteric  • ENMT: Hearing grossly normal, oral mucosa moist  • Respiratory: No increased work of breathing, normal inspiratory effort  • Cardiovascular: Regular rate, no peripheral edema, no jugular venous distention  • Gastrointestinal: Soft, nontender, less than 1 cm opening of the supraumbilical port site with minimal serous drainage, no erythema or induration, no signs of infection  • Skin:  Warm, dry, no rash on visualized skin surfaces  • Musculoskeletal: Symmetric strength, normal gait  • Psychiatric: Alert and oriented ×3, normal affect     Char Rodriguez M.D.  General and Endoscopic Surgery  [facility]    [address] [place] [address], [address]  [place], [place], [address]  P: [phone]  F: [phone]

## 2022-03-13 ENCOUNTER — HOSPITAL ENCOUNTER (EMERGENCY)
Facility: HOSPITAL | Age: 37
Discharge: HOME | End: 2022-03-13
Attending: EMERGENCY MEDICINE | Admitting: EMERGENCY MEDICINE

## 2022-03-13 ENCOUNTER — APPOINTMENT (OUTPATIENT)
Dept: CT IMAGING | Facility: HOSPITAL | Age: 37
End: 2022-03-13

## 2022-03-13 VITALS
WEIGHT: 200 LBS | TEMPERATURE: 98 F | HEIGHT: 65 IN | RESPIRATION RATE: 18 BRPM | DIASTOLIC BLOOD PRESSURE: 100 MMHG | BODY MASS INDEX: 33.32 KG/M2 | HEART RATE: 112 BPM | OXYGEN SATURATION: 95 % | SYSTOLIC BLOOD PRESSURE: 154 MMHG

## 2022-03-13 DIAGNOSIS — R10.13 EPIGASTRIC PAIN: Primary | ICD-10-CM

## 2022-03-13 DIAGNOSIS — K29.00 ACUTE GASTRITIS WITHOUT HEMORRHAGE, UNSPECIFIED GASTRITIS TYPE: ICD-10-CM

## 2022-03-13 DIAGNOSIS — K21.9 GASTROESOPHAGEAL REFLUX DISEASE, UNSPECIFIED WHETHER ESOPHAGITIS PRESENT: ICD-10-CM

## 2022-03-13 LAB
ALBUMIN SERPL-MCNC: 4.1 G/DL (ref 3.5–5.2)
ALBUMIN/GLOB SERPL: 1 G/DL
ALP SERPL-CCNC: 69 U/L (ref 39–117)
ALT SERPL W P-5'-P-CCNC: 18 U/L (ref 1–33)
ANION GAP SERPL CALCULATED.3IONS-SCNC: 12.7 MMOL/L (ref 5–15)
AST SERPL-CCNC: 21 U/L (ref 1–32)
BASOPHILS # BLD AUTO: 0.02 10*3/MM3 (ref 0–0.2)
BASOPHILS NFR BLD AUTO: 0.2 % (ref 0–1.5)
BILIRUB SERPL-MCNC: 0.3 MG/DL (ref 0–1.2)
BILIRUB UR QL STRIP: NEGATIVE
BUN SERPL-MCNC: 12 MG/DL (ref 6–20)
BUN/CREAT SERPL: 13.8 (ref 7–25)
CALCIUM SPEC-SCNC: 9.2 MG/DL (ref 8.6–10.5)
CHLORIDE SERPL-SCNC: 103 MMOL/L (ref 98–107)
CLARITY UR: CLEAR
CO2 SERPL-SCNC: 19.3 MMOL/L (ref 22–29)
COLOR UR: YELLOW
CREAT SERPL-MCNC: 0.87 MG/DL (ref 0.57–1)
DEPRECATED RDW RBC AUTO: 43.6 FL (ref 37–54)
EGFRCR SERPLBLD CKD-EPI 2021: 89.8 ML/MIN/1.73
EOSINOPHIL # BLD AUTO: 0.03 10*3/MM3 (ref 0–0.4)
EOSINOPHIL NFR BLD AUTO: 0.2 % (ref 0.3–6.2)
ERYTHROCYTE [DISTWIDTH] IN BLOOD BY AUTOMATED COUNT: 13.7 % (ref 12.3–15.4)
GLOBULIN UR ELPH-MCNC: 4 GM/DL
GLUCOSE SERPL-MCNC: 123 MG/DL (ref 65–99)
GLUCOSE UR STRIP-MCNC: NEGATIVE MG/DL
HCG SERPL QL: NEGATIVE
HCT VFR BLD AUTO: 41.1 % (ref 34–46.6)
HGB BLD-MCNC: 13.7 G/DL (ref 12–15.9)
HGB UR QL STRIP.AUTO: NEGATIVE
IMM GRANULOCYTES # BLD AUTO: 0.05 10*3/MM3 (ref 0–0.05)
IMM GRANULOCYTES NFR BLD AUTO: 0.4 % (ref 0–0.5)
KETONES UR QL STRIP: NEGATIVE
LEUKOCYTE ESTERASE UR QL STRIP.AUTO: NEGATIVE
LYMPHOCYTES # BLD AUTO: 1.41 10*3/MM3 (ref 0.7–3.1)
LYMPHOCYTES NFR BLD AUTO: 11.5 % (ref 19.6–45.3)
MCH RBC QN AUTO: 29.3 PG (ref 26.6–33)
MCHC RBC AUTO-ENTMCNC: 33.3 G/DL (ref 31.5–35.7)
MCV RBC AUTO: 88 FL (ref 79–97)
MONOCYTES # BLD AUTO: 0.36 10*3/MM3 (ref 0.1–0.9)
MONOCYTES NFR BLD AUTO: 2.9 % (ref 5–12)
NEUTROPHILS NFR BLD AUTO: 10.41 10*3/MM3 (ref 1.7–7)
NEUTROPHILS NFR BLD AUTO: 84.8 % (ref 42.7–76)
NITRITE UR QL STRIP: NEGATIVE
NRBC BLD AUTO-RTO: 0 /100 WBC (ref 0–0.2)
PH UR STRIP.AUTO: 7 [PH] (ref 5–8)
PLATELET # BLD AUTO: 359 10*3/MM3 (ref 140–450)
PMV BLD AUTO: 9.4 FL (ref 6–12)
POTASSIUM SERPL-SCNC: 4.3 MMOL/L (ref 3.5–5.2)
PROT SERPL-MCNC: 8.1 G/DL (ref 6–8.5)
PROT UR QL STRIP: NEGATIVE
RBC # BLD AUTO: 4.67 10*6/MM3 (ref 3.77–5.28)
SODIUM SERPL-SCNC: 135 MMOL/L (ref 136–145)
SP GR UR STRIP: 1.01 (ref 1–1.03)
UROBILINOGEN UR QL STRIP: NORMAL
WBC NRBC COR # BLD: 12.28 10*3/MM3 (ref 3.4–10.8)

## 2022-03-13 PROCEDURE — 80053 COMPREHEN METABOLIC PANEL: CPT | Performed by: EMERGENCY MEDICINE

## 2022-03-13 PROCEDURE — 81003 URINALYSIS AUTO W/O SCOPE: CPT | Performed by: EMERGENCY MEDICINE

## 2022-03-13 PROCEDURE — 96374 THER/PROPH/DIAG INJ IV PUSH: CPT

## 2022-03-13 PROCEDURE — 85025 COMPLETE CBC W/AUTO DIFF WBC: CPT | Performed by: EMERGENCY MEDICINE

## 2022-03-13 PROCEDURE — 99283 EMERGENCY DEPT VISIT LOW MDM: CPT

## 2022-03-13 PROCEDURE — 36415 COLL VENOUS BLD VENIPUNCTURE: CPT

## 2022-03-13 PROCEDURE — 84703 CHORIONIC GONADOTROPIN ASSAY: CPT | Performed by: EMERGENCY MEDICINE

## 2022-03-13 PROCEDURE — 74177 CT ABD & PELVIS W/CONTRAST: CPT

## 2022-03-13 PROCEDURE — 25010000002 IOPAMIDOL 61 % SOLUTION: Performed by: EMERGENCY MEDICINE

## 2022-03-13 RX ORDER — FAMOTIDINE 10 MG/ML
20 INJECTION, SOLUTION INTRAVENOUS ONCE
Status: COMPLETED | OUTPATIENT
Start: 2022-03-13 | End: 2022-03-13

## 2022-03-13 RX ADMIN — FAMOTIDINE 20 MG: 10 INJECTION INTRAVENOUS at 09:38

## 2022-03-13 RX ADMIN — IOPAMIDOL 85 ML: 612 INJECTION, SOLUTION INTRAVENOUS at 11:01

## 2022-03-13 NOTE — ED NOTES
Upper abd discomfort and burning this am. Denies pain at this time. Denies fevers, urinary c/o.     Pt wearing face mask during their stay in ER. This RN wore capr and gloves while providing patient care.

## 2022-03-13 NOTE — ED PROVIDER NOTES
EMERGENCY DEPARTMENT ENCOUNTER    Room Number:  40/40  Date of encounter:  3/13/2022  PCP: Kristan Langford APRN  Historian: Patient      HPI:  Chief Complaint: Abdominal pain  A complete HPI/ROS/PMH/PSH/SH/FH are unobtainable due to: None    Context: Libby Huerta is a 36 y.o. female who presents to the ED via private vehicle for evaluation of cute onset epigastric and left-sided abdominal pain started around 1 AM this morning and woke her from sleep.  Describes a burning pain some radiation up into the chest but mostly in the abdomen and left flank.  Denied any fevers, chills, cough, shortness of breath.  Mild nausea, no vomiting, no diarrhea.  To normal bowel movements yesterday.  Urinating well with no complaints no hematuria.  Admitted to drinking some tequila last night.  Does have history of reflux occasionally.  Took [place] at around 3 AM with no immediate relief.  States that as she was walking into the ER today her pain nearly completely resolved.      MEDICAL RECORD REVIEW    Discharge summary reviewed from March 22, 2021 after being admitted March 14, 2021 for acute ruptured appendicitis with secondary pelvic abscess and ileus.    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   • Fever 03/17/2021   • Hypokalemia 03/17/2021     Resolved Ambulatory Problems     Diagnosis Date Noted   • No Resolved Ambulatory Problems     Past Medical History:   Diagnosis Date   • Anxiety    • Asthma          PAST SURGICAL HISTORY  Past Surgical History:   Procedure Laterality Date   • APPENDECTOMY N/A 3/14/2021    Procedure: APPENDECTOMY LAPAROSCOPIC;  Surgeon: Char Rodriguez,  MD;  Location: [facility] MAIN OR;  Service: General;  Laterality: N/A;   • LASIK     • TONSILLECTOMY           FAMILY HISTORY  Family History   Problem Relation Age of Onset   • Celiac disease Mother    • Cancer Mother    • Cervical cancer Mother    • Thyroid disease Maternal Grandmother    • Rheum arthritis Maternal Grandmother    • Crohn's disease Maternal Grandfather          SOCIAL HISTORY  Social History     Socioeconomic History   • Marital status: Single   Tobacco Use   • Smoking status: Former Smoker   • Smokeless tobacco: Never Used   Vaping Use   • Vaping Use: Never used   Substance and Sexual Activity   • Alcohol use: Yes     Alcohol/week: 1.0 standard drink     Types: 1 Glasses of wine per week     Comment: rare   • Drug use: No   • Sexual activity: Yes     Partners: Male         ALLERGIES  Ceclor [cefaclor]        REVIEW OF SYSTEMS  Review of Systems     All systems reviewed and negative except for those discussed in HPI.       PHYSICAL EXAM    I have reviewed the triage vital signs and nursing notes.    ED Triage Vitals [03/13/22 0911]   Temp Heart Rate Resp BP SpO2   98 °F (36.7 °C) (!) 121 16 -- 95 %      Temp src Heart Rate Source Patient Position BP Location FiO2 (%)   -- -- -- -- --       Physical Exam  General: No acute distress, nontoxic, nondiaphoretic  HEENT: Mucous membranes moist, atraumatic, EOMI  Neck: Full ROM  Pulm: Symmetric chest rise, nonlabored, lungs CTAB  Cardiovascular: Regular rate and rhythm, intact distal pulses  GI: Soft, minimal epigastric tenderness to palpation, nondistended, no rebound, no guarding, bowel sounds present  MSK: Full ROM, no deformity  Skin: Warm, dry  Neuro: Awake, alert, oriented x 4, GCS 15, moving all extremities, no focal deficits  Psych: Calm, cooperative      N95, protective eye goggles, and gloves used during this encounter. Patient in surgical mask.      LAB RESULTS  Recent Results (from the past 24 hour(s))   Comprehensive Metabolic Panel    Collection  Time: 03/13/22  9:27 AM    Specimen: Blood   Result Value Ref Range    Glucose 123 (H) 65 - 99 mg/dL    BUN 12 6 - 20 mg/dL    Creatinine 0.87 0.57 - 1.00 mg/dL    Sodium 135 (L) 136 - 145 mmol/L    Potassium 4.3 3.5 - 5.2 mmol/L    Chloride 103 98 - 107 mmol/L    CO2 19.3 (L) 22.0 - 29.0 mmol/L    Calcium 9.2 8.6 - 10.5 mg/dL    Total Protein 8.1 6.0 - 8.5 g/dL    Albumin 4.10 3.50 - 5.20 g/dL    ALT (SGPT) 18 1 - 33 U/L    AST (SGOT) 21 1 - 32 U/L    Alkaline Phosphatase 69 39 - 117 U/L    Total Bilirubin 0.3 0.0 - 1.2 mg/dL    Globulin 4.0 gm/dL    A/G Ratio 1.0 g/dL    BUN/Creatinine Ratio 13.8 7.0 - 25.0    Anion Gap 12.7 5.0 - 15.0 mmol/L    eGFR 89.8 >60.0 mL/min/1.73   hCG, Serum, Qualitative    Collection Time: 03/13/22  9:27 AM    Specimen: Blood   Result Value Ref Range    HCG Qualitative Negative Negative   CBC Auto Differential    Collection Time: 03/13/22  9:27 AM    Specimen: Blood   Result Value Ref Range    WBC 12.28 (H) 3.40 - 10.80 10*3/mm3    RBC 4.67 3.77 - 5.28 10*6/mm3    Hemoglobin 13.7 12.0 - 15.9 g/dL    Hematocrit 41.1 34.0 - 46.6 %    MCV 88.0 79.0 - 97.0 fL    MCH 29.3 26.6 - 33.0 pg    MCHC 33.3 31.5 - 35.7 g/dL    RDW 13.7 12.3 - 15.4 %    RDW-SD 43.6 37.0 - 54.0 fl    MPV 9.4 6.0 - 12.0 fL    Platelets 359 140 - 450 10*3/mm3    Neutrophil % 84.8 (H) 42.7 - 76.0 %    Lymphocyte % 11.5 (L) 19.6 - 45.3 %    Monocyte % 2.9 (L) 5.0 - 12.0 %    Eosinophil % 0.2 (L) 0.3 - 6.2 %    Basophil % 0.2 0.0 - 1.5 %    Immature Grans % 0.4 0.0 - 0.5 %    Neutrophils, Absolute 10.41 (H) 1.70 - 7.00 10*3/mm3    Lymphocytes, Absolute 1.41 0.70 - 3.10 10*3/mm3    Monocytes, Absolute 0.36 0.10 - 0.90 10*3/mm3    Eosinophils, Absolute 0.03 0.00 - 0.40 10*3/mm3    Basophils, Absolute 0.02 0.00 - 0.20 10*3/mm3    Immature Grans, Absolute 0.05 0.00 - 0.05 10*3/mm3    nRBC 0.0 0.0 - 0.2 /100 WBC   Urinalysis With Microscopic If Indicated (No Culture) - Urine, Clean Catch    Collection Time: 03/13/22  9:40 AM     Specimen: Urine, Clean Catch   Result Value Ref Range    Color, UA Yellow Yellow, Straw    Appearance, UA Clear Clear    pH, UA 7.0 5.0 - 8.0    Specific Gravity, UA 1.012 1.005 - 1.030    Glucose, UA Negative Negative    Ketones, UA Negative Negative    Bilirubin, UA Negative Negative    Blood, UA Negative Negative    Protein, UA Negative Negative    Leuk Esterase, UA Negative Negative    Nitrite, UA Negative Negative    Urobilinogen, UA 0.2 E.U./dL 0.2 - 1.0 E.U./dL       Ordered the above labs and independently reviewed the results.        RADIOLOGY  CT Abdomen Pelvis With Contrast    Result Date: 3/13/2022  CT ABDOMEN PELVIS W CONTRAST-  INDICATIONS: Upper abdominal pain, leukocytosis  TECHNIQUE: Radiation dose reduction techniques were utilized, including automated exposure control and exposure modulation based on body size. Enhanced ABDOMEN AND PELVIS CT  COMPARISON: 03/19/2021  FINDINGS:  Mild fatty infiltration of the pancreatic head is seen.  Otherwise unremarkable appearance of the liver, spleen, adrenal glands, pancreas, kidneys, bladder.  No bowel obstruction or abnormal bowel thickening is identified. No appendix is seen, compatible with stated history of prior appendectomy.  No free intraperitoneal gas or free fluid. Small umbilical hernia of fat is seen.  Scattered small mesenteric and para-aortic lymph nodes are seen that are not significant by size criteria.  Abdominal aorta is not aneurysmal.  The lung bases show numerous air cyst that appear increased from the prior exam. For example, a cyst in the right lower lobe measuring 4.2 cm on axial image 3 was previously 2.4 cm.  Degenerative changes are seen in the spine. No acute fracture is identified.          1. No acute inflammatory process of bowel is identified, follow up as indications persist. 2. No obstructive uropathy. 3. Interval worsening air cyst in the lungs.  This report was finalized on 3/13/2022 11:31 AM by Dr. Tyrell Haynes,  M.D.        I ordered the above noted radiological studies. Reviewed by me.  See dictation for official radiology interpretation.      PROCEDURES    Procedures      MEDICATIONS GIVEN IN ER    Medications   famotidine (PEPCID) injection 20 mg (20 mg Intravenous Given 3/13/22 0938)   iopamidol (ISOVUE-300) 61 % injection 100 mL (85 mL Intravenous Given by Other 3/13/22 1101)         PROGRESS, DATA ANALYSIS, CONSULTS, AND MEDICAL DECISION MAKING    All labs have been independently reviewed by me.  All radiology studies have been reviewed by me and discussed with radiologist dictating the report.   EKG's independently viewed and interpreted by me.  Discussion below represents my analysis of pertinent findings related to patient's condition, differential diagnosis, treatment plan and final disposition.    Initial concern for gastritis, GERD, cholelithiasis, cholecystitis, pancreatitis, colitis, diverticulitis, bowel obstruction, ileus, among others.    ED Course as of 03/13/22 1451   Sun Mar 13, 2022   0940 WBC(!): 12.28 [DC]   0940 Neutrophil Rel %(!): 84.8 [DC]   0940 Immature Granulocyte Rel %: 0.4 [DC]   0958 Nitrite, UA: Negative [DC]   0958 Leukocytes, UA: Negative [DC]   0958 Ketones, UA: Negative [DC]   0958 Glucose(!): 123 [DC]   0958 BUN: 12 [DC]   0958 Creatinine: 0.87 [DC]   0958 Sodium(!): 135 [DC]   0958 Potassium: 4.3 [DC]   0958 ALT (SGPT): 18 [DC]   0958 AST (SGOT): 21 [DC]   0958 Alkaline Phosphatase: 69 [DC]   0958 Total Bilirubin: 0.3 [DC]   1135 CT Abdomen Pelvis With Contrast  reviewed [DC]   1148 Patient remains feeling well at this time, reassuring CT imaging with no evidence of cholecystitis or cholelithiasis, no evidence of bowel obstruction, colitis, diverticulitis, pancreatitis.  At this time I think she is safe for discharge likely with gastritis and GERD.  I recommend over-the-counter Pepcid for a few days, dietary modifications, PCP follow-up, ED return for worsening symptoms as needed.  [DC]      ED Course User Index  [DC] Yuval Yarbrough MD       AS OF 14:51 EDT VITALS:    BP - 154/100  HR - 112  TEMP - 98 °F (36.7 °C)  02 SATS - 95%        DIAGNOSIS  Final diagnoses:   Epigastric pain   Acute gastritis without hemorrhage, unspecified gastritis type   Gastroesophageal reflux disease, unspecified whether esophagitis present         DISPOSITION  DISCHARGE    Patient discharged in stable condition.    Reviewed implications of results, diagnosis, meds, responsibility to follow up, warning signs and symptoms of possible worsening, potential complications and reasons to return to ER.    Patient/Family voiced understanding of above instructions.    Discussed plan for discharge, as there is no emergent indication for admission. Patient referred to primary care provider for BP management due to today's BP. Pt/family is agreeable and understands need for follow up and repeat testing.  Pt is aware that discharge does not mean that nothing is wrong but it indicates no emergency is present that requires admission and they must continue care with follow-up as given below or physician of their choice.     FOLLOW-UP  [facility]  [address] [facility] [address] [phone]    As needed, If symptoms worsen    Kristan Langford, APRN  [address] [id number] [phone]    Schedule an appointment as soon as possible for a visit   As needed         Medication List      No changes were made to your prescriptions during this visit.                    Yuval Yarbrough MD  03/13/22 8756

## 2022-03-13 NOTE — ED TRIAGE NOTES
Pt reports left upper quadrant pain that started this morning around 0100.     Pt was wearing a mask during assessment.  This RN wore appropriate PPE

## 2022-03-13 NOTE — DISCHARGE INSTRUCTIONS
Would recommend over-the-counter Pepcid nightly for the next 1 to 2 weeks, dietary modifications to avoid significant spicy, fatty, or acidic foods with gradual return to normal diet as tolerated.  Stay well-hydrated plenty of water.  PCP follow-up as needed.  ED return for worsening symptoms as needed.

## 2022-04-19 ENCOUNTER — OFFICE VISIT (OUTPATIENT)
Dept: FAMILY MEDICINE CLINIC | Facility: CLINIC | Age: 37
End: 2022-04-19

## 2022-04-19 VITALS
HEART RATE: 102 BPM | DIASTOLIC BLOOD PRESSURE: 98 MMHG | OXYGEN SATURATION: 98 % | RESPIRATION RATE: 16 BRPM | BODY MASS INDEX: 37.32 KG/M2 | TEMPERATURE: 98.4 F | HEIGHT: 65 IN | WEIGHT: 224 LBS | SYSTOLIC BLOOD PRESSURE: 156 MMHG

## 2022-04-19 DIAGNOSIS — R91.8 ABNORMAL CT LUNG SCREENING: ICD-10-CM

## 2022-04-19 DIAGNOSIS — R06.02 SHORTNESS OF BREATH: ICD-10-CM

## 2022-04-19 DIAGNOSIS — R53.83 FATIGUE, UNSPECIFIED TYPE: ICD-10-CM

## 2022-04-19 DIAGNOSIS — Z76.89 ENCOUNTER TO ESTABLISH CARE: Primary | ICD-10-CM

## 2022-04-19 DIAGNOSIS — R41.89 BRAIN FOG: ICD-10-CM

## 2022-04-19 DIAGNOSIS — R03.0 ELEVATED BLOOD PRESSURE READING WITHOUT DIAGNOSIS OF HYPERTENSION: ICD-10-CM

## 2022-04-19 DIAGNOSIS — J98.4 LUNG CYST: ICD-10-CM

## 2022-04-19 DIAGNOSIS — M25.50 ARTHRALGIA, UNSPECIFIED JOINT: ICD-10-CM

## 2022-04-19 PROCEDURE — 99203 OFFICE O/P NEW LOW 30 MIN: CPT | Performed by: NURSE PRACTITIONER

## 2022-04-19 NOTE — PROGRESS NOTES
"Patient ID: Libby Huerta is a 36 y.o. female     Patient Care Team:  Head, JOSE Chandler as PCP - General (Nurse Practitioner)    Subjective     Chief Complaint   Patient presents with   • Establish Care   • Fatigue       History of Present Illness    Libby Huerta presents to [facility] today to establish care with our practice.  She has multiple concerns that she wishes to address today.    Brain and fatigue for the past year.  Has had similar episodes in the past and told she had fibromyalgia.    Headache:  Chronic.Onset her \"entire life\".  States just deal with the symptoms.  Sometimes related to sinus headaches.  Behind right eye and left eye.  Goes from base of skull and up back of head.  Affects sleep.  Has tried Tylenol, Excedrin, and Aleve with some relief  Occur 3 times a week however none this week. Rates headache pain at 5-6 when occur.  Mother has history of migraines.    Thinks she may need to see psychiatry for anxiety and depression.  Treated in 2011 with SSRI.  Stopped taking due to adverse effects.  Worsens with stress over the past year.    She was also hospitalized last year due to ruptured appendix.  She had a CT scan of chest completed which showed multiple months as scattered throughout both lungs raising the possibility of lymphangioleiomyomatosis.  She has not had a follow-up CT regarding this. She has not had any cough or shortness of breath.     Diet:  Decreased sugars and carbs.   Exercise:  Exercises regularly.  Had to stop running over the past few weeks due to SOA.      Has been told she has elevated B/P.   Past due for Pap.      She denies any complaints of fever, chills, cough, chest pain, shortness of air, abdominal pain, nausea, or any other concerns.     The following portions of the patient's history were reviewed and updated as appropriate: allergies, current medications, past family history, past medical history, past social history, past " surgical history and problem list.       Review of Systems   Constitutional: Positive for malaise/fatigue.   Eyes: Negative.    Cardiovascular: Negative.    Respiratory: Negative.    Endocrine: Negative.    Hematologic/Lymphatic: Negative.    Skin: Negative.    Musculoskeletal: Negative.    Gastrointestinal: Negative.    Genitourinary: Negative.    Neurological: Positive for headaches.   Psychiatric/Behavioral: Negative.        Vitals:    04/19/22 1405   BP: 156/98   Pulse:    Resp:    Temp:    SpO2:        Documented weights    04/19/22 1317   Weight: 102 kg (224 lb)     Body mass index is 37.28 kg/m².    Results for orders placed or performed in visit on 04/19/22   CHRIS Direct Reflex to 11 Biomarker    Specimen: Blood   Result Value Ref Range    CHRIS Direct Negative Negative   RHEUMATOID FACTOR    Specimen: Blood   Result Value Ref Range    RA Latex Turbid 17.7 (H) <14.0 IU/mL   Sedimentation Rate    Specimen: Blood   Result Value Ref Range    Sed Rate 16 0 - 32 mm/hr   C-reactive protein    Specimen: Blood   Result Value Ref Range    C-Reactive Protein 18 (H) 0 - 10 mg/L   CBC (No Diff)    Specimen: Blood   Result Value Ref Range    WBC 14.9 (H) 3.4 - 10.8 x10E3/uL    RBC 4.86 3.77 - 5.28 x10E6/uL    Hemoglobin 14.1 11.1 - 15.9 g/dL    Hematocrit 42.7 34.0 - 46.6 %    MCV 88 79 - 97 fL    MCH 29.0 26.6 - 33.0 pg    MCHC 33.0 31.5 - 35.7 g/dL    RDW 13.0 11.7 - 15.4 %    Platelets 371 150 - 450 x10E3/uL   Comprehensive Metabolic Panel    Specimen: Blood   Result Value Ref Range    Glucose 79 65 - 99 mg/dL    BUN 13 6 - 20 mg/dL    Creatinine 0.96 0.57 - 1.00 mg/dL    EGFR Result 80 >59 mL/min/1.73    BUN/Creatinine Ratio 14 9 - 23    Sodium 135 134 - 144 mmol/L    Potassium 4.2 3.5 - 5.2 mmol/L    Chloride 99 96 - 106 mmol/L    Total CO2 19 (L) 20 - 29 mmol/L    Calcium 9.5 8.7 - 10.2 mg/dL    Total Protein 7.9 6.0 - 8.5 g/dL    Albumin 4.4 3.8 - 4.8 g/dL    Globulin 3.5 1.5 - 4.5 g/dL    A/G Ratio 1.3 1.2 - 2.2     Total Bilirubin 0.2 0.0 - 1.2 mg/dL    Alkaline Phosphatase 77 44 - 121 IU/L    AST (SGOT) 16 0 - 40 IU/L    ALT (SGPT) 18 0 - 32 IU/L   Vitamin B12 & Folate    Specimen: Blood   Result Value Ref Range    Vitamin B-12 963 232 - 1,245 pg/mL    Folate 7.6 >3.0 ng/mL   Vitamin D 25 Hydroxy    Specimen: Blood   Result Value Ref Range    25 Hydroxy, Vitamin D 32.5 30.0 - 100.0 ng/mL   TSH Rfx On Abnormal To Free T4    Specimen: Blood   Result Value Ref Range    TSH 2.240 0.450 - 4.500 uIU/mL   SARS-CoV-2 Antibodies, Nucleocapsid (Natural Immunity)   Result Value Ref Range    SARS-COV-2 ANTIBODIES Positive Negative       ECG 12 Lead    Date/Time: 4/21/2022 12:01 PM  Performed by: Chloe Avilez APRN  Authorized by: Chloe Avilez APRN   Previous ECG: no previous ECG available  Rhythm: sinus rhythm  Rate: normal  Conduction: conduction normal  ST Segments: ST segments normal  T Waves: T waves normal  QRS axis: normal  Other: no other findings    Clinical impression: normal ECG                  Objective     Physical Exam  Constitutional:       Appearance: She is well-developed.   HENT:      Head: Normocephalic and atraumatic.      Right Ear: Tympanic membrane normal.      Left Ear: Tympanic membrane normal.   Eyes:      Pupils: Pupils are equal, round, and reactive to light.   Cardiovascular:      Rate and Rhythm: Normal rate and regular rhythm.      Heart sounds: Normal heart sounds. No murmur heard.  Pulmonary:      Effort: Pulmonary effort is normal.      Breath sounds: Normal breath sounds. No wheezing, rhonchi or rales.   Abdominal:      General: Bowel sounds are normal.      Palpations: Abdomen is soft.      Tenderness: There is no abdominal tenderness.   Musculoskeletal:         General: No tenderness. Normal range of motion.      Cervical back: Normal range of motion and neck supple.   Skin:     General: Skin is warm and dry.      Findings: No erythema or rash.   Neurological:      Mental Status: She is alert and  oriented to person, place, and time.   Psychiatric:         Behavior: Behavior normal.           Patient screened positive for depression based on a PHQ-9 score of 16 on 4/19/2022. Follow-up recommendations include: PCP managing depression.  Awaiting lab results.       Assessment/Plan     Assessment/Plan     Diagnoses and all orders for this visit:    1. Encounter to establish care (Primary)    2. Lung cyst  -     CT Chest Without Contrast; Future    3. Abnormal CT lung screening  -     CT Chest Without Contrast; Future    4. Shortness of breath  -     CT Chest Without Contrast; Future  -     CBC (No Diff)    5. Fatigue, unspecified type  -     ECG 12 Lead  -     CHRIS Direct Reflex to 11 Biomarker  -     RHEUMATOID FACTOR  -     Sedimentation Rate  -     C-reactive protein  -     CBC (No Diff)  -     Comprehensive Metabolic Panel  -     Vitamin B12 & Folate  -     Vitamin D 25 Hydroxy  -     TSH Rfx On Abnormal To Free T4  -     SARS-CoV-2 Antibodies, Nucleocapsid (Natural Immunity)    6. Elevated blood pressure reading without diagnosis of hypertension  -     ECG 12 Lead  -     CBC (No Diff)  -     Comprehensive Metabolic Panel    7. Arthralgia, unspecified joint  -     CHRIS Direct Reflex to 11 Biomarker  -     RHEUMATOID FACTOR  -     Sedimentation Rate  -     C-reactive protein    8. Brain fog  -     SARS-CoV-2 Antibodies, Nucleocapsid (Natural Immunity)    Other orders  -     SARS-CoV-2 Antibodies, Nucleocapsid (Natural Immunity)        Summary:  Libby Huerta presents office today to establish care with our practice.  Her main concern is worsening fatigue, headache, and brain fog.  Also complains of body aches all over.  Has been told in the past possibly related to fibromyalgia.  She is wishing to come up on a conclusion for the cause of her current symptoms.  This is been an ongoing problem.  She is unaware of any recent COVID exposure however advised we will check antibodies today.  We will also repeat CHRIS,  rheumatoid factor, sed rate, C-reactive protein to rule out any autoimmune disorder.  We will also check other blood work concerning for to include CBC, CMP, thyroid function test, vitamin B12, vitamin D levels.  Blood pressure also elevated in office today 156/98.  Possibly the cause of her headaches.  Advised we need to continue to monitor for now.  She needs to decrease salt intake.  Drink plenty of fluids.  Incorporate exercise into daily routine.  Follow healthy heart diet.  EKG completed in office today was stable showing sinus rhythm.  Also ordered for repeat CT scan of chest for further evaluation concerning multiple lung cysts.  We will have her return in approximately 4 weeks for recheck concerning hypertension.    In the meantime, instructed to contact us sooner for any problems or concerns.    Follow Up:  Return in about 4 weeks (around 5/17/2022), or if symptoms worsen or fail to improve, for Recheck.    Patient was given instructions and counseling regarding condition or for health maintenance advice.  Please see specific information pulled into the AVS if appropriate.      Patient was wearing facemask when I entered the room and throughout our encounter. Protective equipment was worn throughout this patient encounter including a face mask, eye protection,  and gloves. Hand hygiene was performed before donning protective equipment and after removal when leaving the room.     Chloe Avilez, JOSE  Family Medicine  [facility] W[facility]  04/21/22  12:18 EDT

## 2022-04-20 LAB
25(OH)D3+25(OH)D2 SERPL-MCNC: 32.5 NG/ML (ref 30–100)
ALBUMIN SERPL-MCNC: 4.4 G/DL (ref 3.8–4.8)
ALBUMIN/GLOB SERPL: 1.3 {RATIO} (ref 1.2–2.2)
ALP SERPL-CCNC: 77 IU/L (ref 44–121)
ALT SERPL-CCNC: 18 IU/L (ref 0–32)
ANA SER QL: NEGATIVE
AST SERPL-CCNC: 16 IU/L (ref 0–40)
BILIRUB SERPL-MCNC: 0.2 MG/DL (ref 0–1.2)
BUN SERPL-MCNC: 13 MG/DL (ref 6–20)
BUN/CREAT SERPL: 14 (ref 9–23)
CALCIUM SERPL-MCNC: 9.5 MG/DL (ref 8.7–10.2)
CHLORIDE SERPL-SCNC: 99 MMOL/L (ref 96–106)
CO2 SERPL-SCNC: 19 MMOL/L (ref 20–29)
CREAT SERPL-MCNC: 0.96 MG/DL (ref 0.57–1)
CRP SERPL-MCNC: 18 MG/L (ref 0–10)
EGFRCR SERPLBLD CKD-EPI 2021: 80 ML/MIN/1.73
ERYTHROCYTE [DISTWIDTH] IN BLOOD BY AUTOMATED COUNT: 13 % (ref 11.7–15.4)
ERYTHROCYTE [SEDIMENTATION RATE] IN BLOOD BY WESTERGREN METHOD: 16 MM/HR (ref 0–32)
FOLATE SERPL-MCNC: 7.6 NG/ML
GLOBULIN SER CALC-MCNC: 3.5 G/DL (ref 1.5–4.5)
GLUCOSE SERPL-MCNC: 79 MG/DL (ref 65–99)
HCT VFR BLD AUTO: 42.7 % (ref 34–46.6)
HGB BLD-MCNC: 14.1 G/DL (ref 11.1–15.9)
MCH RBC QN AUTO: 29 PG (ref 26.6–33)
MCHC RBC AUTO-ENTMCNC: 33 G/DL (ref 31.5–35.7)
MCV RBC AUTO: 88 FL (ref 79–97)
PLATELET # BLD AUTO: 371 X10E3/UL (ref 150–450)
POTASSIUM SERPL-SCNC: 4.2 MMOL/L (ref 3.5–5.2)
PROT SERPL-MCNC: 7.9 G/DL (ref 6–8.5)
RBC # BLD AUTO: 4.86 X10E6/UL (ref 3.77–5.28)
RHEUMATOID FACT SERPL-ACNC: 17.7 IU/ML
SARS-COV-2 AB SERPL QL IA: POSITIVE
SODIUM SERPL-SCNC: 135 MMOL/L (ref 134–144)
TSH SERPL DL<=0.005 MIU/L-ACNC: 2.24 UIU/ML (ref 0.45–4.5)
VIT B12 SERPL-MCNC: 963 PG/ML (ref 232–1245)
WBC # BLD AUTO: 14.9 X10E3/UL (ref 3.4–10.8)

## 2022-04-21 PROCEDURE — 93000 ELECTROCARDIOGRAM COMPLETE: CPT | Performed by: NURSE PRACTITIONER

## 2022-04-22 DIAGNOSIS — E55.9 VITAMIN D DEFICIENCY: Primary | ICD-10-CM

## 2022-04-22 DIAGNOSIS — R76.8 RHEUMATOID FACTOR POSITIVE: Primary | ICD-10-CM

## 2022-04-22 RX ORDER — ERGOCALCIFEROL 1.25 MG/1
50000 CAPSULE ORAL WEEKLY
Qty: 12 CAPSULE | Refills: 0 | Status: SHIPPED | OUTPATIENT
Start: 2022-04-22 | End: 2022-04-22

## 2022-05-13 ENCOUNTER — APPOINTMENT (OUTPATIENT)
Dept: CT IMAGING | Facility: HOSPITAL | Age: 37
End: 2022-05-13

## 2022-05-16 ENCOUNTER — HOSPITAL ENCOUNTER (OUTPATIENT)
Dept: CT IMAGING | Facility: HOSPITAL | Age: 37
Discharge: HOME | End: 2022-05-16
Admitting: NURSE PRACTITIONER

## 2022-05-16 DIAGNOSIS — J98.4 LUNG CYST: ICD-10-CM

## 2022-05-16 DIAGNOSIS — R06.02 SHORTNESS OF BREATH: ICD-10-CM

## 2022-05-16 DIAGNOSIS — R91.8 ABNORMAL CT LUNG SCREENING: ICD-10-CM

## 2022-05-16 PROCEDURE — 71250 CT THORAX DX C-: CPT

## 2022-05-17 ENCOUNTER — OFFICE VISIT (OUTPATIENT)
Dept: FAMILY MEDICINE CLINIC | Facility: CLINIC | Age: 37
End: 2022-05-17

## 2022-05-17 VITALS
BODY MASS INDEX: 36.82 KG/M2 | DIASTOLIC BLOOD PRESSURE: 96 MMHG | HEIGHT: 65 IN | OXYGEN SATURATION: 98 % | SYSTOLIC BLOOD PRESSURE: 144 MMHG | HEART RATE: 104 BPM | RESPIRATION RATE: 16 BRPM | TEMPERATURE: 98.4 F | WEIGHT: 221 LBS

## 2022-05-17 DIAGNOSIS — R91.8 ABNORMAL CT LUNG SCREENING: ICD-10-CM

## 2022-05-17 DIAGNOSIS — D72.829 LEUKOCYTOSIS, UNSPECIFIED TYPE: ICD-10-CM

## 2022-05-17 DIAGNOSIS — J98.4 LUNG CYST: ICD-10-CM

## 2022-05-17 DIAGNOSIS — J84.81: Primary | ICD-10-CM

## 2022-05-17 DIAGNOSIS — R29.818 SUSPECTED SLEEP APNEA: ICD-10-CM

## 2022-05-17 DIAGNOSIS — I10 HYPERTENSION, UNSPECIFIED TYPE: ICD-10-CM

## 2022-05-17 PROCEDURE — 99213 OFFICE O/P EST LOW 20 MIN: CPT | Performed by: NURSE PRACTITIONER

## 2022-05-17 RX ORDER — LISINOPRIL 5 MG/1
5 TABLET ORAL DAILY
Qty: 30 TABLET | Refills: 2 | Status: SHIPPED | OUTPATIENT
Start: 2022-05-17 | End: 2022-08-11 | Stop reason: SDUPTHER

## 2022-05-17 RX ORDER — ALBUTEROL SULFATE 90 UG/1
2 AEROSOL, METERED RESPIRATORY (INHALATION) EVERY 4 HOURS PRN
Qty: 18 G | Refills: 0 | Status: SHIPPED | OUTPATIENT
Start: 2022-05-17

## 2022-05-17 NOTE — PROGRESS NOTES
Patient ID: Libby Huerta is a 36 y.o. female     Patient Care Team:  Head, JOSE Chandler as PCP - General (Nurse Practitioner)    Subjective     Chief Complaint   Patient presents with   • Hypertension       History of Present Illness    Libby Huerta presents to [facility] today for follow-up concerning hypertension.  Since she was last seen, she continues to have problems with possible sleep apnea.  Feels like she stops breathing while sleeping.  Develops migraines.  Also worsening fatigue despite 7-8 hours of sleep at night.    Concerning her hypertension, she has been monitoring at home and has been staying around 140s over 90s.  She also is having migraine headaches.  Has been watching her salt intake and drinking plenty of fluids.  No vision changes, heart palpitations, leg swelling, or any other concerns.  She denies any complaints of fever, chills, cough, chest pain, shortness of air, abdominal pain, nausea, or any other concerns.     The following portions of the patient's history were reviewed and updated as appropriate: allergies, current medications, past family history, past medical history, past social history, past surgical history and problem list.       ROS    Vitals:    05/17/22 1136   BP: 144/96   Pulse: 104   Resp: 16   Temp: 98.4 °F (36.9 °C)   SpO2: 98%       Documented weights    05/17/22 1136   Weight: 100 kg (221 lb)     Body mass index is 36.78 kg/m².    Results for orders placed or performed in visit on 05/17/22   CBC w MANUAL Differential    Specimen: Blood   Result Value Ref Range    WBC 9.3 3.4 - 10.8 x10E3/uL    RBC 4.51 3.77 - 5.28 x10E6/uL    Hemoglobin 13.6 11.1 - 15.9 g/dL    Hematocrit 40.2 34.0 - 46.6 %    MCV 89 79 - 97 fL    MCH 30.2 26.6 - 33.0 pg    MCHC 33.8 31.5 - 35.7 g/dL    RDW 12.7 11.7 - 15.4 %    Platelets 344 150 - 450 x10E3/uL    Neutrophil Rel % 67 Not Estab. %    Lymphocyte Rel % 24 Not Estab. %    Monocyte Rel % 5 Not Estab. %     Eosinophil Rel % 4 Not Estab. %    Basophil Rel % 0 Not Estab. %    Neutrophils Absolute 6.2 1.4 - 7.0 x10E3/uL    Lymphocytes Absolute 2.3 0.7 - 3.1 x10E3/uL    Monocytes Absolute 0.5 0.1 - 0.9 x10E3/uL    Eosinophils Absolute 0.4 0.0 - 0.4 x10E3/uL    Basophils Absolute 0.0 0.0 - 0.2 x10E3/uL    Immature Granulocyte Rel % 0 Not Estab. %    Immature Grans Absolute 0.0 0.0 - 0.1 x10E3/uL         CT Chest Without Contrast Diagnostic (05/16/2022 11:09)     Objective     Physical Exam  Vitals reviewed.   Constitutional:       General: She is not in acute distress.  HENT:      Head: Normocephalic and atraumatic.   Eyes:      Extraocular Movements: Extraocular movements intact.   Cardiovascular:      Rate and Rhythm: Normal rate and regular rhythm.      Heart sounds: No murmur heard.  Pulmonary:      Effort: Pulmonary effort is normal.      Breath sounds: Normal breath sounds. No wheezing.   Musculoskeletal:      Right lower leg: No edema.      Left lower leg: No edema.   Skin:     General: Skin is warm and dry.   Neurological:      Mental Status: She is alert and oriented to person, place, and time.            Assessment & Plan     Assessment/Plan     Diagnoses and all orders for this visit:    1. Lymphangiomyomatosis (HCC) (Primary)  -     Ambulatory Referral to Pulmonology    2. Lung cyst  -     Ambulatory Referral to Pulmonology    3. Abnormal CT lung screening  -     Ambulatory Referral to Pulmonology    4. Leukocytosis, unspecified type  -     CBC w MANUAL Differential    5. Suspected sleep apnea  -     Ambulatory Referral to Pulmonology    6. Hypertension, unspecified type    Other orders  -     lisinopril (PRINIVIL,ZESTRIL) 5 MG tablet; Take 1 tablet by mouth Daily.  Dispense: 30 tablet; Refill: 2  -     albuterol sulfate  (90 Base) MCG/ACT inhaler; Inhale 2 puffs Every 4 (Four) Hours As Needed for Wheezing or Shortness of Air.  Dispense: 18 g; Refill: 0    [facility] on HT[facility] in 2 weeks.       Follow Up:  Return if symptoms worsen or fail to improve.       In the meantime, instructed to contact us sooner for any problems or concerns.      Patient was given instructions and counseling regarding condition or for health maintenance advice.  Please see specific information pulled into the AVS if appropriate.      Patient was wearing facemask when I entered the room and throughout our encounter. Protective equipment was worn throughout this patient encounter including a face mask, eye protection,  and gloves. Hand hygiene was performed before donning protective equipment and after removal when leaving the room.     Chloe Avilez, APRN  Family Medicine  [facility] [place]  05/19/22  17:54 EDT

## 2022-05-18 LAB
BASOPHILS # BLD AUTO: 0 X10E3/UL (ref 0–0.2)
BASOPHILS NFR BLD AUTO: 0 %
EOSINOPHIL # BLD AUTO: 0.4 X10E3/UL (ref 0–0.4)
EOSINOPHIL NFR BLD AUTO: 4 %
ERYTHROCYTE [DISTWIDTH] IN BLOOD BY AUTOMATED COUNT: 12.7 % (ref 11.7–15.4)
HCT VFR BLD AUTO: 40.2 % (ref 34–46.6)
HGB BLD-MCNC: 13.6 G/DL (ref 11.1–15.9)
IMM GRANULOCYTES # BLD AUTO: 0 X10E3/UL (ref 0–0.1)
IMM GRANULOCYTES NFR BLD AUTO: 0 %
LYMPHOCYTES # BLD AUTO: 2.3 X10E3/UL (ref 0.7–3.1)
LYMPHOCYTES NFR BLD AUTO: 24 %
MCH RBC QN AUTO: 30.2 PG (ref 26.6–33)
MCHC RBC AUTO-ENTMCNC: 33.8 G/DL (ref 31.5–35.7)
MCV RBC AUTO: 89 FL (ref 79–97)
MONOCYTES # BLD AUTO: 0.5 X10E3/UL (ref 0.1–0.9)
MONOCYTES NFR BLD AUTO: 5 %
NEUTROPHILS # BLD AUTO: 6.2 X10E3/UL (ref 1.4–7)
NEUTROPHILS NFR BLD AUTO: 67 %
PLATELET # BLD AUTO: 344 X10E3/UL (ref 150–450)
RBC # BLD AUTO: 4.51 X10E6/UL (ref 3.77–5.28)
WBC # BLD AUTO: 9.3 X10E3/UL (ref 3.4–10.8)

## 2022-06-15 ENCOUNTER — OFFICE VISIT (OUTPATIENT)
Dept: SLEEP MEDICINE | Facility: HOSPITAL | Age: 37
End: 2022-06-15

## 2022-06-15 VITALS
HEART RATE: 108 BPM | DIASTOLIC BLOOD PRESSURE: 85 MMHG | BODY MASS INDEX: 36.49 KG/M2 | SYSTOLIC BLOOD PRESSURE: 132 MMHG | OXYGEN SATURATION: 97 % | WEIGHT: 219 LBS | HEIGHT: 65 IN

## 2022-06-15 DIAGNOSIS — G47.33 OBSTRUCTIVE SLEEP APNEA: ICD-10-CM

## 2022-06-15 DIAGNOSIS — J98.4 MULTIPLE IDIOPATHIC CYSTS OF LUNG: Primary | ICD-10-CM

## 2022-06-15 DIAGNOSIS — R06.83 SNORING: ICD-10-CM

## 2022-06-15 DIAGNOSIS — G47.30 OBSERVED SLEEP APNEA: ICD-10-CM

## 2022-06-15 DIAGNOSIS — R06.00 DYSPNEA, UNSPECIFIED TYPE: ICD-10-CM

## 2022-06-15 DIAGNOSIS — E66.9 OBESITY (BMI 30-39.9): ICD-10-CM

## 2022-06-15 DIAGNOSIS — R53.83 FATIGUE, UNSPECIFIED TYPE: ICD-10-CM

## 2022-06-15 PROCEDURE — G0463 HOSPITAL OUTPT CLINIC VISIT: HCPCS

## 2022-06-15 NOTE — PROGRESS NOTES
"[facility] SLEEP MEDICINE  [address]  [address]RD [address]LO[address]  [place] [address]  [phone]    Referring Physician: JOSE Hill  PCP: Chloe Avilez APRN    Reason for consult:  Sleep disorders of waking tired    Libby Huerta is a 36 y.o.female was seen in the [facility] today. She sleeps from 10:30pm to 6am. Wakes up tired and unrefreshed. Multiple arousals at night to use the restroom. Has fatigue for a long time. Dyspnea for past year. CT suggestive of PEÑA last year and repeated recently. She snores and has awoken gasping. Gained 30 lbs in last year. Has bruxism.  [place] Sleepiness Score: 11. Caffeine intake 1 per day. Alcohol intake 2 per week.    Libby Huerta  has a past medical history of Anxiety and Asthma. HTN    Current Medications:    Current Outpatient Medications:   •  albuterol sulfate  (90 Base) MCG/ACT inhaler, Inhale 2 puffs Every 4 (Four) Hours As Needed for Wheezing or Shortness of Air., Disp: 18 g, Rfl: 0  •  lisinopril (PRINIVIL,ZESTRIL) 5 MG tablet, Take 1 tablet by mouth Daily., Disp: 30 tablet, Rfl: 2   also listed in Sleep Questionnaire.    FH: family history includes Cancer in her mother; Celiac disease in her mother; Cervical cancer in her mother; Crohn's disease in her maternal grandfather; Rheum arthritis in her maternal grandmother; Thyroid disease in her maternal grandmother.  SH:  reports that she has never smoked. She has never used smokeless tobacco. She reports current alcohol use of about 1.0 standard drink of alcohol per week. She reports that she does not use drugs.    Pertinent Positive Review of Systems of fatigue, dyspnea  Rest of Review of Systems was negative as recorded in Sleep Questionnaire.        Vital Signs: /85   Pulse 108   Ht 165.1 cm (65\")   Wt 99.3 kg (219 lb)   SpO2 97%   BMI 36.44 kg/m²     Body mass index is 36.44 kg/m².       Tongue: Large       Dentition: good       Pharynx: Posterior pharyngeal pillars are " wide   Mallampatti: II (hard and soft palate, upper portion of tonsils anduvula visible)        General: Alert. Cooperative. Well developed. No acute distress.             Head:  Normocephalic. Symmetrical. Atraumatic.              Eyes: Sclera clear. No icterus. PERRLA. Normal EOM.             Ears: No deformities. Normal hearing.             Nose: No septal deviation. No drainage.          Throat: No oral lesions. No thrush. Moist mucous membranes.    Chest Wall:  Normal shape. Symmetric expansion with respiration. No tenderness.             Neck:  Trachea midline.           Lungs:  Clear to auscultation bilaterally. No wheezes. No rhonchi. No rales. Respirations regular, even and unlabored.            Heart:  Regular rhythm and normal rate. Normal S1 and S2. No murmur.     Abdomen:  Soft, non-tender and non-distended. Normal bowel sounds. No masses.  Extremities:  Moves all extremities well. No edema.           Pulses: Pulses palpable and equal bilaterally.               Skin: Dry. Intact. No bleeding. No rash.           Neuro: Moves all 4 extremities and cranial nerves grossly intact.  Psychiatric: Normal mood and affect.    Impression:  1. Multiple idiopathic cysts of lung    2. Dyspnea, unspecified type    3. Fatigue, unspecified type    4. Observed sleep apnea    5. Snoring    6. Obstructive sleep apnea    7. Obesity (BMI 30-39.9)          Orders Placed This Encounter   Procedures   • Home Sleep Study     Standing Status:   Future     Standing Expiration Date:   6/15/2023     Scheduling Instructions:      Ask patient to sleep on back as much as possible on night of study     Order Specific Question:   May take own meds     Answer:   Yes     Order Specific Question:   Details     Answer:   O2 Implementation per Protocol            Plan:  Libby requires further evaluation for sleep disordered breathing.  We will start with a HST.  Explained that she will need to sleep supine for at least a portion of the  night.  Possible treatment options also briefly discussed.    Abnormal CT and dyspnea - send labs to r/o PEÑA.  I have reviewed information from her admission in the hospital last year.  She prefers to follow-up with me at [facility] office in [place].  She does have fatigue symptoms and possibly some dyspnea.  The diagnosis of PEÑA is not confirmed and will need confirmation with VGEF-D and possible lung biopsy.  Other testing including CHRIS and alpha-1 antitrypsin were also requested.  Patient is a lifelong non-smoker.    This patient has typical features of obstructive sleep apnea with hypersomnolence snoring and apneas along with elevated BMI and classic oropharyngeal structure.  Likelihood of obstructive sleep apnea is high and a polysomnogram study will therefore be requested.      Possible diagnosis and pathophysiology of obstructive sleep apnea was discussed with the patient.  Health risks of untreated obstructive sleep apnea including cardiovascular risks were discussed.  Patient was cautioned about activities requiring full concentration especially driving at night or for longer distances, and until hypersomnolence is corrected.    Results of sleep testing will be reviewed to see if patient is a candidate for CPAP machine.  Alternatives to therapy include oral mandibular advancing device (OMAD) were discussed. However OMAD is usually only beneficial in mild obstructive sleep apnea.  The benefit of weight loss in reducing severity of obstructive sleep apnea was discussed.  Patient would benefit from adhering to a strict diet to achieve ideal BMI.     Patient will follow up LPC.    Thank you for allowing me to participate in your patient's care.    Electronically signed by Fernando Almonte MD, 06/15/22, 8:34 AM EDT.    Part of this note may be an electronic transcription/translation of spoken language to printed text using the Dragon Dictation System.

## 2022-07-01 ENCOUNTER — HOSPITAL ENCOUNTER (OUTPATIENT)
Dept: SLEEP MEDICINE | Facility: HOSPITAL | Age: 37
Discharge: HOME | End: 2022-07-01
Admitting: INTERNAL MEDICINE

## 2022-07-01 DIAGNOSIS — G47.33 OBSTRUCTIVE SLEEP APNEA: ICD-10-CM

## 2022-07-01 PROCEDURE — 95806 SLEEP STUDY UNATT&RESP EFFT: CPT

## 2022-07-19 ENCOUNTER — TELEPHONE (OUTPATIENT)
Dept: FAMILY MEDICINE CLINIC | Facility: CLINIC | Age: 37
End: 2022-07-19

## 2022-07-19 NOTE — TELEPHONE ENCOUNTER
PATIENT CALLED AND STATES SHE  WOULD LIKE TO HAVE HER BLOOD PRESSURE MEDICATION INCREASED, THIS WAS DISCUSSED AT LAST VISIT.    SHE CURRENTLY TAKES   lisinopril (PRINIVIL,ZESTRIL) 5 MG tablet      SHE ALSO STATES HER PSYCHIATRIST IS WANTING TO START HER ON PROZAC 20 MG AND CLONIDINE .1MG    SHE BELIVES THESE MEDICATIONS WILL ALSO INCREASE HER BLOOD PRESSURE.   PLEASE CALL AND ADVISE [phone]      [id number] - [place], [place] - [address] [place] [address] AT [facility] [address] [place] [address] - [phone]  - [phone] FX  [phone]

## 2022-07-20 ENCOUNTER — TELEPHONE (OUTPATIENT)
Dept: SLEEP MEDICINE | Facility: HOSPITAL | Age: 37
End: 2022-07-20

## 2022-07-20 NOTE — TELEPHONE ENCOUNTER
Spoke with patient advised her Chloe is out of office this week and pt states that its fine to hold the message until next week when she returns.

## 2022-07-25 NOTE — TELEPHONE ENCOUNTER
Hub to share     Left pt v/m to call and schedule an appointment with Chloe to discuss blood pressure and medications.

## 2022-07-25 NOTE — TELEPHONE ENCOUNTER
Actually clonidine can lower her B/P.  Please have her schedule an appointment to be seen for follow-up on B/P before able to adjust medication.

## 2022-08-11 ENCOUNTER — OFFICE VISIT (OUTPATIENT)
Dept: FAMILY MEDICINE CLINIC | Facility: CLINIC | Age: 37
End: 2022-08-11

## 2022-08-11 VITALS
WEIGHT: 220 LBS | HEART RATE: 100 BPM | RESPIRATION RATE: 16 BRPM | DIASTOLIC BLOOD PRESSURE: 102 MMHG | TEMPERATURE: 97.8 F | BODY MASS INDEX: 36.65 KG/M2 | HEIGHT: 65 IN | SYSTOLIC BLOOD PRESSURE: 144 MMHG | OXYGEN SATURATION: 98 %

## 2022-08-11 DIAGNOSIS — Z12.4 CERVICAL CANCER SCREENING: ICD-10-CM

## 2022-08-11 DIAGNOSIS — Z00.00 ANNUAL PHYSICAL EXAM: Primary | ICD-10-CM

## 2022-08-11 DIAGNOSIS — I10 HYPERTENSION, UNSPECIFIED TYPE: ICD-10-CM

## 2022-08-11 DIAGNOSIS — F34.1 DYSTHYMIA: ICD-10-CM

## 2022-08-11 PROCEDURE — 3008F BODY MASS INDEX DOCD: CPT | Performed by: NURSE PRACTITIONER

## 2022-08-11 PROCEDURE — 2014F MENTAL STATUS ASSESS: CPT | Performed by: NURSE PRACTITIONER

## 2022-08-11 PROCEDURE — 99395 PREV VISIT EST AGE 18-39: CPT | Performed by: NURSE PRACTITIONER

## 2022-08-11 RX ORDER — CLONIDINE HYDROCHLORIDE 0.1 MG/1
0.1 TABLET ORAL DAILY
COMMUNITY
Start: 2022-06-21

## 2022-08-11 RX ORDER — FLUOXETINE HYDROCHLORIDE 20 MG/1
20 CAPSULE ORAL DAILY
COMMUNITY
Start: 2022-06-21

## 2022-08-11 RX ORDER — LISINOPRIL 5 MG/1
5 TABLET ORAL DAILY
Qty: 90 TABLET | Refills: 3 | Status: SHIPPED | OUTPATIENT
Start: 2022-08-11 | End: 2022-08-17

## 2022-08-11 NOTE — PROGRESS NOTES
"Answers for HPI/ROS submitted by the patient on 8/11/2022  What is the primary reason for your visit?: High Blood Pressure    Chief Complaint   Patient presents with   • Hypertension   • Annual Exam       Patient Care Team:  Head, JOSE Chandler as PCP - General (Nurse Practitioner)    Subjective   Libby Huerta is a 37 y.o. female and is here for a yearly physical exam. The patient reports no problems.    B/P:  Usually well controlled at home.  Staying around 120-130/80.  However she forgot to take her medication this am.  She has also been limiting salt intake.  Plans to start exercising soon.      Followed by psychiatry for dysthymia.  Prescribed Prozac and clonidine however has not started yet.  Increased stressors in life.  Wanted to make sure ok to take with B/P medication.    Underwent sleep study which showed sleep apnea however has yet to be set up with equipment.  Has upcoming appt with pulmonary to discuss results. She states also is to have PFT's completed.  She has been using albuterol inhaler before activity which has been helping.   She was previously referred to rheumatology due to positive RA factor however she has not heard anything yet about an appointment.      Do you take any herbs or supplements that were not prescribed by a doctor? yes. If so, these will be added to active medication list.    Health Habits:  Dental Exam: Up to date  Eye Exam: Up to date  Diet: Low salt    Exercise: Plans to start soon.    Current exercise activities include: Rowing machine at home and walking.    Pap:  Not UTD.  \"Followed by someone in [place] years ago\".  Mammogram: Never.    Colonoscopy: Never     The following portions of the patient's history were reviewed and updated as appropriate: allergies, current medications, past family history, past medical history, past social history, past surgical history and problem list.    Social and Family and Surgical History reviewed and updated today, see Rooming " "tab.    Health History, Preventive Measures and Vaccination flow sheets reviewed and updated today.    Patient's current medical chart in Epic; including previous office notes, imaging, labs, specialist's evaluation either in notes or in Media tab reviewed today.    Other pertinent medical information also reviewed thru Care Everywhere function is also reviewed today.    Review of Systems  Review of Systems  Vitals:    08/11/22 0901   BP: (!) 144/102   BP Location: Left arm   Patient Position: Sitting   Cuff Size: Adult   Pulse: 100   Resp: 16   Temp: 97.8 °F (36.6 °C)   SpO2: 98%   Weight: 99.8 kg (220 lb)   Height: 165.1 cm (65\")     Wt Readings from Last 3 Encounters:   08/11/22 99.8 kg (220 lb)   06/15/22 99.3 kg (219 lb)   05/17/22 100 kg (221 lb)       General Appearance:  Alert, cooperative, no distress, appears stated age   Head:  Normocephalic, without obvious abnormality, atraumatic   Eyes:  PERRL, conjunctiva/corneas clear, EOM's intact.   Ears:  Normal TM's and external ear canals, both ears   Nose: Nares normal, septum midline, mucosa normal, no drainage or sinus tenderness   Throat: Lips, mucosa, and tongue normal; teeth and gums normal   Neck: Supple, symmetrical, trachea midline, no adenopathy;   thyroid: No enlargement/tenderness/nodules       Lungs:  Clear to auscultation bilaterally, respirations even and unlabored   Chest wall:  No tenderness or deformity   Heart:  Regular rate and rhythm, S1 and S2 normal, no murmur, rub or gallop   Abdomen:  Soft, non-tender, bowel sounds active all four quadrants,   no masses, no organomegaly           Extremities: Extremities normal, atraumatic, no cyanosis or edema   Pulses: 2+ and symmetric all extremities   Skin: Skin color, texture, turgor normal, no rashes or lesions   Lymph nodes: Cervical, supraclavicular, and axillary nodes normal   Neurologic: CNII-XII intact. Normal strength, sensation and reflexes   throughout       Results for orders placed or " performed in visit on 05/17/22   CBC w MANUAL Differential    Specimen: Blood   Result Value Ref Range    WBC 9.3 3.4 - 10.8 x10E3/uL    RBC 4.51 3.77 - 5.28 x10E6/uL    Hemoglobin 13.6 11.1 - 15.9 g/dL    Hematocrit 40.2 34.0 - 46.6 %    MCV 89 79 - 97 fL    MCH 30.2 26.6 - 33.0 pg    MCHC 33.8 31.5 - 35.7 g/dL    RDW 12.7 11.7 - 15.4 %    Platelets 344 150 - 450 x10E3/uL    Neutrophil Rel % 67 Not Estab. %    Lymphocyte Rel % 24 Not Estab. %    Monocyte Rel % 5 Not Estab. %    Eosinophil Rel % 4 Not Estab. %    Basophil Rel % 0 Not Estab. %    Neutrophils Absolute 6.2 1.4 - 7.0 x10E3/uL    Lymphocytes Absolute 2.3 0.7 - 3.1 x10E3/uL    Monocytes Absolute 0.5 0.1 - 0.9 x10E3/uL    Eosinophils Absolute 0.4 0.0 - 0.4 x10E3/uL    Basophils Absolute 0.0 0.0 - 0.2 x10E3/uL    Immature Granulocyte Rel % 0 Not Estab. %    Immature Grans Absolute 0.0 0.0 - 0.1 x10E3/uL     Assessment & Plan   Healthy female exam.  Diagnoses and all orders for this visit:    1. Annual physical exam (Primary)  -     CBC (No Diff); Future  -     Comprehensive Metabolic Panel; Future  -     Lipid Panel; Future  -     TSH Rfx On Abnormal To Free T4; Future  -     Vitamin D 25 Hydroxy; Future    2. Hypertension, unspecified type  -     lisinopril (PRINIVIL,ZESTRIL) 5 MG tablet; Take 1 tablet by mouth Daily.  Dispense: 90 tablet; Refill: 3  -     CBC (No Diff); Future  -     Comprehensive Metabolic Panel; Future  -     Lipid Panel; Future  -     TSH Rfx On Abnormal To Free T4; Future  -     Vitamin D 25 Hydroxy; Future    3. Cervical cancer screening  -     Ambulatory Referral to Gynecology    4. Dysthymia  Start fluoxetine and clonidine as directed per psychiatry.      1. Libby Huerta Has had increased stressors since she was last seen.  Currently being followed by psychiatry.  Advised to start taking medication, fluoxetine and clonidine, as directed.  Advised can be taken with her lisinopril 5 mg daily.  However she needs to monitor her blood  pressure at home due to clonidine possibly affecting blood pressure.  She is let us know if it starts running less than 100/60.  Blood pressure currently elevated in office today at 144/102 however she has not taken her medication this morning.  We will have her return in 1 year for next annual physical with fasting labs.  In the meantime, instructed contact us sooner for any problems or concerns.   2. Patient Counseling:  --Nutrition: Stressed importance of moderation in sodium/caffeine intake, saturated fat and cholesterol.  Discussed caloric balance, sufficient intake of fresh fruits, vegetables, fiber,    calcium, iron.  --Exercise: Stressed the importance of regular exercise.   --Substance Abuse: Discussed cessation/primary prevention of tobacco, alcohol, or other drug use; driving or other dangerous activities under the influence.    --Dental health: Discussed importance of regular tooth brushing, flossing, and dental visits.  --Suggested having eyes and vision checked if needed or past due.  --Immunizations reviewed.  --Discussed benefits of screening colonoscopy.  3. Discussed the patient's BMI with her.  The BMI is above average; BMI management plan is completed  4. Follow up next physical in 1 year    Patient was given instructions and counseling regarding condition or for health maintenance advice.  Please see specific information pulled into the AVS if appropriate.      Medications Discontinued During This Encounter   Medication Reason   • lisinopril (PRINIVIL,ZESTRIL) 5 MG tablet Reorder          Patient was wearing facemask when I entered the room and throughout our encounter. Protective equipment was worn by me throughout this patient encounter including a face mask.  Hand hygiene was performed before donning protective equipment and after removal when leaving the room.     Chloe Avilez, APRN  Family Practice  [facility] [place]

## 2022-08-12 NOTE — PROGRESS NOTES
Her referral was sent in June. The office reaches out to the pt to [facility] appt. I tried to call the pt and HER MAILBOX IS FULL. So, if anyone hears from her she can call [facility] office to [facility] her appt [phone], thank you

## 2022-08-17 DIAGNOSIS — I10 HYPERTENSION, UNSPECIFIED TYPE: ICD-10-CM

## 2022-08-17 RX ORDER — LISINOPRIL 5 MG/1
5 TABLET ORAL DAILY
Qty: 90 TABLET | Refills: 1 | Status: SHIPPED | OUTPATIENT
Start: 2022-08-17

## 2022-08-19 ENCOUNTER — OFFICE VISIT (OUTPATIENT)
Dept: SLEEP MEDICINE | Facility: HOSPITAL | Age: 37
End: 2022-08-19

## 2022-08-19 VITALS
DIASTOLIC BLOOD PRESSURE: 73 MMHG | OXYGEN SATURATION: 97 % | BODY MASS INDEX: 37.15 KG/M2 | SYSTOLIC BLOOD PRESSURE: 123 MMHG | HEIGHT: 65 IN | WEIGHT: 223 LBS | HEART RATE: 96 BPM

## 2022-08-19 DIAGNOSIS — G47.33 OBSTRUCTIVE SLEEP APNEA: Primary | ICD-10-CM

## 2022-08-19 DIAGNOSIS — J84.81 LYMPHANGIOLEIOMYOMATOSIS: ICD-10-CM

## 2022-08-19 PROCEDURE — G0463 HOSPITAL OUTPT CLINIC VISIT: HCPCS

## 2022-08-19 NOTE — PROGRESS NOTES
"[facility]  Telephone: [phone] / Fax: [phone] [place]  Telephone: [phone] / Fax: [phone] Kaitlin Choi    PCP: Head, JOSE Chandler    Reason for visit: discuss sleep study results.    Libby Huerta is a 37 y.o.female  [facility]. In view of hypersomnia and snoring, we did a home sleep study. She is here today to discuss sleep study results and treatment recommendations. She reports clenching of the teeth at night and persistent fatigue. She has possible dx of PEÑA and is pending f/u with Dr Almonte at [facility] next week with PFT. I reviewed prior records and CT. Her sleep schedule is 11pm-7am. ESS is 8.    SH- no tobacco, 4 alc per week, 1 cup per day.    ROS- +anxiety, +depression, rest is negative.    Current Medications:    Current Outpatient Medications:   •  albuterol sulfate  (90 Base) MCG/ACT inhaler, Inhale 2 puffs Every 4 (Four) Hours As Needed for Wheezing or Shortness of Air., Disp: 18 g, Rfl: 0  •  BIOTIN PO, Take  by mouth., Disp: , Rfl:   •  cloNIDine (CATAPRES) 0.1 MG tablet, Take 0.1 mg by mouth Daily., Disp: , Rfl:   •  FLUoxetine (PROzac) 20 MG capsule, Take 20 mg by mouth Daily., Disp: , Rfl:   •  lisinopril (PRINIVIL,ZESTRIL) 5 MG tablet, TAKE 1 TABLET BY MOUTH DAILY, Disp: 90 tablet, Rfl: 1   also entered in Sleep Questionnaire    Patient  has a past medical history of Anxiety and Asthma.    I have reviewed the Past Medical History, Past Surgical History, Social History and Family History.    Vital Signs /73   Pulse 96   Ht 165.1 cm (65\")   Wt 101 kg (223 lb)   SpO2 97%   BMI 37.11 kg/m²  Body mass index is 37.11 kg/m².    General Alert and oriented. No acute distress noted   Pharynx/Throat Class IV  Mallampati airway, large tongue, no evidence of redundant lateral pharyngeal tissue. No oral lesions. No thrush. Moist mucous membranes.   Head Normocephalic. Symmetrical. Atraumatic.    Nose No septal deviation. " No drainage   Chest Wall Normal shape. Symmetric expansion with respiration. No tenderness.   Neck Trachea midline, no thyromegaly or adenopathy    Lungs Clear to auscultation bilaterally. No wheezes. No rhonchi. No rales. Respirations regular, even and unlabored.   Heart Regular rhythm and normal rate. Normal S1 and S2. No murmur   Abdomen Soft, non-tender and non-distended. Normal bowel sounds. No masses.   Extremities Moves all extremities well. No edema   Psychiatric Normal mood and affect.     Testing:    Study- Diagnostic findings: The patient tolerated the home sleep testing with monitoring time of 485 minutes. The data obtained make this a technically adequate study. The apnea hypopneas index(AHI) was 8.8 per sleep hour.  The AHI during supine position was 9.7 per sleep hour. Mean heart rate of 69.5 BPM.  Snoring was noted 22.2% of sleep time. Lowest oxygen saturation during the study was 83%. Saturation below 89% was noted for 0.7 mins.     CT chest May 2022- Lung parenchyma and pleural space: Resolution of small bilateral pleural  effusions and bibasilar airspace disease. Multiple thin wall cysts of  variable size are redemonstrated some are stable somewhat increased in  size including and 4.3 cm index cystic mass in the right midlung (series  2 image 40) previously 3.8 cm, right lower lobe 3.7 cm cyst previously  measured 2.2 cm, and a 3.3 cm cyst in the medial left lower lobe  previously measuring 1.8 cm.     Upper abdomen: Noncontrast imaging of the upper abdomen is within normal  Limits.    Impression:  1. Obstructive sleep apnea    2. Lymphangioleiomyomatosis (HCC)        Plan  I reviewed DAVID pathophysiology and sleep study results with patient. We discussed adverse outcomes associated with untreated DAVID. We discussed various modalities of treatment and went through the pros and cons of each. Oral mandibular advancing device was also discussed.   I  recommend treatment with a positive airway pressure  device.  She has clenching of the teeth and OMAD may not be her best option.      Positive airway pressure devices are most effective management strategy. Various mask interfaces were discussed.  I explained that the most important factor in compliance is a comfortable mask and the profile of the mask on the face (full face / nasal etc.) eventually makes little difference. I reminded her that the device mask can be changed within the first month several times if it does not fit properly. Thereafter it can only be changed every 3 months through insurance.       The pressure on the positive airway pressure machine can feel overwhelming at first.  If this is the case, patient could use the machine while awake, such as while watching television, in order to get used to it.   Hopefully, the face will adjust to the pressures within a few weeks and thereafter will feel far less noticeable.  I explained to the patient the necessity of breathing through the nose and not through the mouth.       Compliance requirements of insurance were discussed and especially the fact that lack of compliance within the first 90 days of at least 4 hours usage nightly may result in repossession of the positive airway pressure device by the durable medical equipment company.  Patient  will then have to have a repeat sleep study prior to getting a new device.     I have prescribed auto CPAP 5-15cm H2O. Pattient will return to see Dr Almonte for compliance recheck after being on the machine for 31-90 days    See Dr Almonte at [facility] next week for PFTs and evaluation for PEÑA.     I appreciate the opportunity to participate in this patient's care.      JOSE Miller  [facility]  Phone: [phone]

## 2022-08-22 ENCOUNTER — TELEPHONE (OUTPATIENT)
Dept: SLEEP MEDICINE | Facility: HOSPITAL | Age: 37
End: 2022-08-22

## 2023-02-07 DIAGNOSIS — I10 HYPERTENSION, UNSPECIFIED TYPE: ICD-10-CM

## 2023-02-07 DIAGNOSIS — Z00.00 ANNUAL PHYSICAL EXAM: ICD-10-CM

## 2023-02-10 LAB
25(OH)D3+25(OH)D2 SERPL-MCNC: 37.4 NG/ML (ref 30–100)
ALBUMIN SERPL-MCNC: 4.5 G/DL (ref 3.5–5.2)
ALBUMIN/GLOB SERPL: 1.4 G/DL
ALP SERPL-CCNC: 67 U/L (ref 39–117)
ALT SERPL-CCNC: 28 U/L (ref 1–33)
AST SERPL-CCNC: 20 U/L (ref 1–32)
BILIRUB SERPL-MCNC: 0.3 MG/DL (ref 0–1.2)
BUN SERPL-MCNC: 19 MG/DL (ref 6–20)
BUN/CREAT SERPL: 18.1 (ref 7–25)
CALCIUM SERPL-MCNC: 9.5 MG/DL (ref 8.6–10.5)
CHLORIDE SERPL-SCNC: 102 MMOL/L (ref 98–107)
CHOLEST SERPL-MCNC: 159 MG/DL (ref 0–200)
CO2 SERPL-SCNC: 23.9 MMOL/L (ref 22–29)
CREAT SERPL-MCNC: 1.05 MG/DL (ref 0.57–1)
EGFRCR SERPLBLD CKD-EPI 2021: 71.2 ML/MIN/1.73
ERYTHROCYTE [DISTWIDTH] IN BLOOD BY AUTOMATED COUNT: 13 % (ref 12.3–15.4)
GLOBULIN SER CALC-MCNC: 3.3 GM/DL
GLUCOSE SERPL-MCNC: 93 MG/DL (ref 65–99)
HCT VFR BLD AUTO: 45.3 % (ref 34–46.6)
HDLC SERPL-MCNC: 45 MG/DL (ref 40–60)
HGB BLD-MCNC: 14.8 G/DL (ref 12–15.9)
LDLC SERPL CALC-MCNC: 91 MG/DL (ref 0–100)
MCH RBC QN AUTO: 29 PG (ref 26.6–33)
MCHC RBC AUTO-ENTMCNC: 32.7 G/DL (ref 31.5–35.7)
MCV RBC AUTO: 88.8 FL (ref 79–97)
PLATELET # BLD AUTO: 361 10*3/MM3 (ref 140–450)
POTASSIUM SERPL-SCNC: 5 MMOL/L (ref 3.5–5.2)
PROT SERPL-MCNC: 7.8 G/DL (ref 6–8.5)
RBC # BLD AUTO: 5.1 10*6/MM3 (ref 3.77–5.28)
SODIUM SERPL-SCNC: 138 MMOL/L (ref 136–145)
TRIGL SERPL-MCNC: 131 MG/DL (ref 0–150)
TSH SERPL DL<=0.005 MIU/L-ACNC: 2.54 UIU/ML (ref 0.27–4.2)
VLDLC SERPL CALC-MCNC: 23 MG/DL (ref 5–40)
WBC # BLD AUTO: 10.92 10*3/MM3 (ref 3.4–10.8)

## 2023-08-07 DIAGNOSIS — I10 HYPERTENSION, UNSPECIFIED TYPE: ICD-10-CM

## 2023-08-07 RX ORDER — LISINOPRIL 5 MG/1
5 TABLET ORAL DAILY
Qty: 90 TABLET | Refills: 1 | Status: SHIPPED | OUTPATIENT
Start: 2023-08-07

## 2023-08-14 RX ORDER — ALBUTEROL SULFATE 90 UG/1
2 AEROSOL, METERED RESPIRATORY (INHALATION) EVERY 4 HOURS PRN
Qty: 18 G | Refills: 0 | Status: SHIPPED | OUTPATIENT
Start: 2023-08-14

## 2023-08-28 RX ORDER — ALBUTEROL SULFATE 90 UG/1
AEROSOL, METERED RESPIRATORY (INHALATION)
Qty: 18 G | Refills: 0 | OUTPATIENT
Start: 2023-08-28

## 2024-02-22 ENCOUNTER — OFFICE VISIT (OUTPATIENT)
Dept: FAMILY MEDICINE CLINIC | Facility: CLINIC | Age: 39
End: 2024-02-22
Payer: MEDICAID

## 2024-02-22 VITALS
BODY MASS INDEX: 37.94 KG/M2 | SYSTOLIC BLOOD PRESSURE: 117 MMHG | WEIGHT: 228 LBS | HEART RATE: 96 BPM | OXYGEN SATURATION: 96 % | DIASTOLIC BLOOD PRESSURE: 84 MMHG | TEMPERATURE: 98 F

## 2024-02-22 DIAGNOSIS — E55.9 VITAMIN D DEFICIENCY: ICD-10-CM

## 2024-02-22 DIAGNOSIS — M76.52 PATELLAR TENDINITIS OF BOTH KNEES: ICD-10-CM

## 2024-02-22 DIAGNOSIS — D72.829 LEUKOCYTOSIS, UNSPECIFIED TYPE: ICD-10-CM

## 2024-02-22 DIAGNOSIS — E53.8 B12 DEFICIENCY: ICD-10-CM

## 2024-02-22 DIAGNOSIS — E87.6 HYPOKALEMIA: ICD-10-CM

## 2024-02-22 DIAGNOSIS — F32.A DEPRESSION, UNSPECIFIED DEPRESSION TYPE: ICD-10-CM

## 2024-02-22 DIAGNOSIS — E66.9 OBESITY (BMI 30-39.9): ICD-10-CM

## 2024-02-22 DIAGNOSIS — N92.6 IRREGULAR PERIODS: ICD-10-CM

## 2024-02-22 DIAGNOSIS — R73.09 ELEVATED RANDOM BLOOD GLUCOSE LEVEL: ICD-10-CM

## 2024-02-22 DIAGNOSIS — I10 HYPERTENSION, UNSPECIFIED TYPE: Primary | ICD-10-CM

## 2024-02-22 DIAGNOSIS — Z12.4 SCREENING FOR CERVICAL CANCER: ICD-10-CM

## 2024-02-22 DIAGNOSIS — F34.1 DYSTHYMIA: ICD-10-CM

## 2024-02-22 DIAGNOSIS — G47.33 OSA (OBSTRUCTIVE SLEEP APNEA): ICD-10-CM

## 2024-02-22 DIAGNOSIS — R76.8 RHEUMATOID FACTOR POSITIVE: ICD-10-CM

## 2024-02-22 DIAGNOSIS — M76.51 PATELLAR TENDINITIS OF BOTH KNEES: ICD-10-CM

## 2024-02-22 PROBLEM — R19.7 DIARRHEA: Status: RESOLVED | Noted: 2021-03-17 | Resolved: 2024-02-22

## 2024-02-22 PROBLEM — Z98.890 POSTOPERATIVE HYPOXIA: Status: RESOLVED | Noted: 2021-03-15 | Resolved: 2024-02-22

## 2024-02-22 PROBLEM — K35.33 ACUTE APPENDICITIS WITH LOCALIZED PERITONITIS AND ABSCESS, WITHOUT GANGRENE: Status: RESOLVED | Noted: 2021-03-14 | Resolved: 2024-02-22

## 2024-02-22 PROBLEM — L08.9 TOE INFECTION: Status: ACTIVE | Noted: 2024-02-22

## 2024-02-22 PROBLEM — R50.9 FEVER: Status: RESOLVED | Noted: 2021-03-17 | Resolved: 2024-02-22

## 2024-02-22 PROBLEM — R09.02 POSTOPERATIVE HYPOXIA: Status: RESOLVED | Noted: 2021-03-15 | Resolved: 2024-02-22

## 2024-02-22 PROBLEM — Z90.49 S/P APPENDECTOMY: Status: ACTIVE | Noted: 2024-02-22

## 2024-02-22 RX ORDER — LISINOPRIL 5 MG/1
5 TABLET ORAL DAILY
Qty: 90 TABLET | Refills: 1 | Status: SHIPPED | OUTPATIENT
Start: 2024-02-22

## 2024-02-22 NOTE — PROGRESS NOTES
"Chief Complaint  Establish Care    Subjective        Libby Huerta presents to [facility]  History of Present Illness    Having some ongoing issues with depression.  Also concerned she needs to cut out sugar and would like her glucose checked.   Has infection on her toe, saw podiatry and was told to use epsom salt baths. Nail stopped growing last year.   Feels like she has hormonal imbalance.   Periods are every 2-3 months and has been the case for the last several years. Last pap smear was 5+ years ago.   Has been taking fish oil, multivitamin, collagen. Felt like ligaments were painful and this is improved. Had hair loss after sepsis with appendix rupture and has been taking biotin since.     Depression has been a lifelong issue. Was in therapy at age 8. Does not see a therapist now. Has not had a good experience with therapist. Would prefer not to take medication. Never been inpatient. Feels like she is stubborn and musters through but notices it affecting her job and career. No thoughts of self harm today or before. Feels stressed and having a hard time balancing everything she needs to to move forward.     Objective   Vital Signs:  /84 (BP Location: Right arm, Patient Position: Sitting, Cuff Size: Large Adult)   Pulse 96   Temp 98 °F (36.7 °C)   Wt 103 kg (228 lb)   SpO2 96%   BMI 37.94 kg/m²   Estimated body mass index is 37.94 kg/m² as calculated from the following:    Height as of 8/19/22: 165.1 cm (65\").    Weight as of this encounter: 103 kg (228 lb).             Physical Exam  Vitals and nursing note reviewed.   Constitutional:       General: She is not in acute distress.     Appearance: Normal appearance. She is not ill-appearing.   HENT:      Head: Normocephalic and atraumatic.      Nose: Nose normal.      Mouth/Throat:      Mouth: Mucous membranes are moist.   Eyes:      Extraocular Movements: Extraocular movements intact.      Conjunctiva/sclera: Conjunctivae " normal.   Cardiovascular:      Rate and Rhythm: Normal rate and regular rhythm.      Heart sounds: Normal heart sounds. No murmur heard.     No gallop.   Pulmonary:      Effort: Pulmonary effort is normal. No respiratory distress.      Breath sounds: Normal breath sounds. No stridor. No wheezing, rhonchi or rales.   Chest:      Chest wall: No tenderness.   Skin:     General: Skin is warm and dry.   Neurological:      General: No focal deficit present.      Mental Status: She is alert and oriented to person, place, and time. Mental status is at baseline.   Psychiatric:         Mood and Affect: Mood normal.         Behavior: Behavior normal.        Result Review :                     Assessment and Plan     Diagnoses and all orders for this visit:    1. Hypertension, unspecified type (Primary)  -     Comprehensive metabolic panel    2. Obesity (BMI 30-39.9)  -     Comprehensive metabolic panel    3. Elevated random blood glucose level  -     Hemoglobin A1c    4. Hypokalemia  -     Comprehensive metabolic panel    5. Leukocytosis, unspecified type    6. Dysthymia    7. DAVID (obstructive sleep apnea)    8. Patellar tendinitis of both knees  -     Ambulatory Referral to Physical Therapy Evaluate and treat    9. Depression, unspecified depression type  -     Ambulatory Referral to Psychology    10. Irregular periods  -     Estrogens, Total  -     Progesterone  -     Follicle stimulating hormone  -     DHEA-sulfate  -     Prolactin  -     Luteinizing hormone  -     Testosterone Free Direct  -     TSH Rfx On Abnormal To Free T4  -     CBC w AUTO Differential  -     Iron and TIBC  -     Ambulatory Referral to Gynecology    11. B12 deficiency  -     Vitamin B12    12. Vitamin D deficiency  -     Vitamin D 25 hydroxy    13. Screening for cervical cancer  -     Ambulatory Referral to Gynecology    14. Rheumatoid factor positive  -     Ambulatory Referral to Rheumatology    New patient here to establish care.  Has interest in  treating her depression without medication.  Will place referral for psychology.  Discussed with patient that psychiatrist prescribed medications if she is looking to avoid psychology or clinical therapist is best for her.  Blood pressure is well-controlled today.  No medication changes.  Patient is experiencing irregular menstrual cycles and fatigue.  Will obtain lab workup as above and refer to oncology.  Patient has history of positive rheumatoid factor.  Does have some pains in her hands and knees.  That combined with general fatigue and depression.  Will place referral for rheumatology.  Physical therapy referral placed for knees as well.       Follow Up     Return in about 6 months (around 8/22/2024).  Patient was given instructions and counseling regarding her condition or for health maintenance advice. Please see specific information pulled into the AVS if appropriate.

## 2024-02-29 LAB
25(OH)D3+25(OH)D2 SERPL-MCNC: 36.3 NG/ML (ref 30–100)
ALBUMIN SERPL-MCNC: 4.6 G/DL (ref 3.9–4.9)
ALBUMIN/GLOB SERPL: 1.4 {RATIO} (ref 1.2–2.2)
ALP SERPL-CCNC: 67 IU/L (ref 44–121)
ALT SERPL-CCNC: 24 IU/L (ref 0–32)
AST SERPL-CCNC: 20 IU/L (ref 0–40)
BASOPHILS # BLD AUTO: 0 X10E3/UL (ref 0–0.2)
BASOPHILS NFR BLD AUTO: 0 %
BILIRUB SERPL-MCNC: 0.4 MG/DL (ref 0–1.2)
BUN SERPL-MCNC: 15 MG/DL (ref 6–20)
BUN/CREAT SERPL: 17 (ref 9–23)
CALCIUM SERPL-MCNC: 9.6 MG/DL (ref 8.7–10.2)
CHLORIDE SERPL-SCNC: 103 MMOL/L (ref 96–106)
CO2 SERPL-SCNC: 23 MMOL/L (ref 20–29)
CREAT SERPL-MCNC: 0.9 MG/DL (ref 0.57–1)
DHEA-S SERPL-MCNC: 401 UG/DL (ref 57.3–279.2)
EGFRCR SERPLBLD CKD-EPI 2021: 85 ML/MIN/1.73
EOSINOPHIL # BLD AUTO: 0.4 X10E3/UL (ref 0–0.4)
EOSINOPHIL NFR BLD AUTO: 4 %
ERYTHROCYTE [DISTWIDTH] IN BLOOD BY AUTOMATED COUNT: 13 % (ref 11.7–15.4)
ESTROGEN SERPL-MCNC: 330 PG/ML
FSH SERPL-ACNC: 6.3 MIU/ML
GLOBULIN SER CALC-MCNC: 3.3 G/DL (ref 1.5–4.5)
GLUCOSE SERPL-MCNC: 91 MG/DL (ref 70–99)
HBA1C MFR BLD: 5.7 % (ref 4.8–5.6)
HCT VFR BLD AUTO: 43.7 % (ref 34–46.6)
HGB BLD-MCNC: 14.3 G/DL (ref 11.1–15.9)
IMM GRANULOCYTES # BLD AUTO: 0 X10E3/UL (ref 0–0.1)
IMM GRANULOCYTES NFR BLD AUTO: 0 %
IRON SATN MFR SERPL: 18 % (ref 15–55)
IRON SERPL-MCNC: 65 UG/DL (ref 27–159)
LH SERPL-ACNC: 15.8 MIU/ML
LYMPHOCYTES # BLD AUTO: 3.1 X10E3/UL (ref 0.7–3.1)
LYMPHOCYTES NFR BLD AUTO: 33 %
MCH RBC QN AUTO: 29 PG (ref 26.6–33)
MCHC RBC AUTO-ENTMCNC: 32.7 G/DL (ref 31.5–35.7)
MCV RBC AUTO: 89 FL (ref 79–97)
MONOCYTES # BLD AUTO: 0.6 X10E3/UL (ref 0.1–0.9)
MONOCYTES NFR BLD AUTO: 6 %
NEUTROPHILS # BLD AUTO: 5.3 X10E3/UL (ref 1.4–7)
NEUTROPHILS NFR BLD AUTO: 57 %
PLATELET # BLD AUTO: 358 X10E3/UL (ref 150–450)
POTASSIUM SERPL-SCNC: 5.4 MMOL/L (ref 3.5–5.2)
PROGEST SERPL-MCNC: 0.3 NG/ML
PROLACTIN SERPL-MCNC: 32 NG/ML (ref 4.8–33.4)
PROT SERPL-MCNC: 7.9 G/DL (ref 6–8.5)
RBC # BLD AUTO: 4.93 X10E6/UL (ref 3.77–5.28)
SODIUM SERPL-SCNC: 140 MMOL/L (ref 134–144)
TESTOST FREE SERPL-MCNC: 4.6 PG/ML (ref 0–4.2)
TIBC SERPL-MCNC: 354 UG/DL (ref 250–450)
TSH SERPL DL<=0.005 MIU/L-ACNC: 2.65 UIU/ML (ref 0.45–4.5)
UIBC SERPL-MCNC: 289 UG/DL (ref 131–425)
VIT B12 SERPL-MCNC: 708 PG/ML (ref 232–1245)
WBC # BLD AUTO: 9.4 X10E3/UL (ref 3.4–10.8)

## 2024-03-05 ENCOUNTER — HOSPITAL ENCOUNTER (OUTPATIENT)
Dept: PHYSICAL THERAPY | Facility: HOSPITAL | Age: 39
Discharge: HOME | End: 2024-03-05
Admitting: STUDENT IN AN ORGANIZED HEALTH CARE EDUCATION/TRAINING PROGRAM
Payer: MEDICAID

## 2024-03-05 DIAGNOSIS — M62.81 MUSCLE WEAKNESS: ICD-10-CM

## 2024-03-05 DIAGNOSIS — M25.561 CHRONIC PAIN OF BOTH KNEES: Primary | ICD-10-CM

## 2024-03-05 DIAGNOSIS — M25.562 CHRONIC PAIN OF BOTH KNEES: Primary | ICD-10-CM

## 2024-03-05 DIAGNOSIS — G89.29 CHRONIC PAIN OF BOTH KNEES: Primary | ICD-10-CM

## 2024-03-05 PROCEDURE — 97161 PT EVAL LOW COMPLEX 20 MIN: CPT

## 2024-03-05 PROCEDURE — 97110 THERAPEUTIC EXERCISES: CPT

## 2024-03-05 NOTE — THERAPY EVALUATION
Outpatient Physical Therapy Ortho Initial Evaluation  [facility]     Patient Name: Libby Huerta  : 1985  MRN: [id number]  Today's Date: 3/5/2024      Visit Date: 2024    Patient Active Problem List   Diagnosis    Obesity (BMI 30-39.9)    Elevated random blood glucose level    Lymphangioleiomyomatosis    Hypokalemia    Hypertension    Dysthymia    S/P appendectomy    Toe infection    DAVID (obstructive sleep apnea)        Past Medical History:   Diagnosis Date    Anxiety     Asthma     childhood asthma        Past Surgical History:   Procedure Laterality Date    APPENDECTOMY N/A 3/14/2021    Procedure: APPENDECTOMY LAPAROSCOPIC;  Surgeon: Char Rodriguez MD;  Location: [facility] MAIN OR;  Service: General;  Laterality: N/A;    LASIK      TONSILLECTOMY         Visit Dx:     ICD-10-CM ICD-9-CM   1. Chronic pain of both knees  M25.561 719.46    M25.562 338.29    G89.29    2. Muscle weakness  M62.81 728.87          Patient History       Row Name 24 0900             History    Chief Complaint Pain  -CC      Type of Pain Knee pain  -CC      Brief Description of Current Complaint Libby Huerta is a 38 y.o. female who presents today with chronic saurabh knee pain. Pt reports pain ongoing for several years, no known DIANE. Does report she used to use the stairmaster a lot a long time ago. States that she went some place in [facility] several years ago, and had x-rays and was told she had pattellar tendonitis. Reports she used to do heavy weight lifting years ago, but doesn't do that anymore. Reports she has recently put on a lot of weight. Libby Huerta reports difficulty/increased pain with navigating stairs (one at a time), jogging down hill, getting off the ground, kneeling. Pain relieving factors include avoiding the above activities, Tylenol. Uses patellar tendonitis bands, but has not noticed any relief. Reports she would like to get back into running, has started a walking program, did about 10 miles  total last week. PMH includes L shoulder pain. Seeing a chiropractor for her neck currently.  -CC      Patient/Caregiver Goals Comment be able to navigate stairs, get back into running  -CC      Occupation/sports/leisure activities Real Estate, read  -CC         Pain     Pain Location Knee  -CC      Pain at Present 0  -CC      Pain at Best 0  -CC      Pain at Worst 5  -CC      Is your sleep disturbed? No  -CC         Fall Risk Assessment    Any falls in the past year: No  -CC         Daily Activities    Primary Language English  -CC      Barriers to learning None  -CC      Pt Participated in POC and Goals Yes  -CC         Safety    Have you had any of the following issues with Depression  -CC                User Key  (r) = Recorded By, (t) = Taken By, (c) = Cosigned By      Initials Name Provider Type    CC Bety Ramsay PT Physical Therapist                     PT Ortho       Row Name 03/05/24 0900       Knee Palpation    Knee Palpation? No Tenderness/Abnormality  -CC       Patellar Accessory Motions    Patellar Accessory Motions Tested? Yes  -CC    Superior glide Right:;Left:;Hypermobile  -CC    Inferior glide Right:;Left:;WNL  -CC    Medial glide Right:;Left:;WNL  -CC    Lateral glide Right:;Left:;Hypermobile  -CC       Knee Special Tests    Anterior drawer (ACL lesion) Negative  -CC    Lachman’s (ACL lesion) Negative  -CC    Posterior drawer (PCL lesion) Negative  -CC    Valgus stress (MCL lesion) Negative  -CC    Varus stress (LCL lesion) Negative  -CC    Bounce home test (meniscal lesion) Negative  -CC       General ROM    RT Lower Ext Rt Knee Extension/Flexion  -CC    LT Lower Ext Lt Knee Extension/Flexion  -CC       Right Lower Ext    Rt Knee Extension/Flexion AROM 7-0-125  -CC       Left Lower Ext    Lt Knee Extension/Flexion AROM 6-0-130  -CC       MMT (Manual Muscle Testing)    Rt Lower Ext Rt Hip Flexion;Rt Hip ABduction;Rt Knee Extension;Rt Knee Flexion;Rt Ankle Dorsiflexion;Rt Hip Extension   -CC    Lt Lower Ext Lt Hip Flexion;Lt Hip ABduction;Lt Hip Extension;Lt Knee Extension;Lt Knee Flexion;Lt Ankle Dorsiflexion  -CC       MMT Right Lower Ext    Rt Hip Flexion MMT, Gross Movement (5/5) normal  -CC    Rt Hip Extension MMT, Gross Movement (4/5) good  -CC    Rt Hip ABduction MMT, Gross Movement (4/5) good  -CC    Rt Knee Extension MMT, Gross Movement (5/5) normal  -CC    Rt Knee Flexion MMT, Gross Movement (5/5) normal  -CC    Rt Ankle Dorsiflexion MMT, Gross Movement (5/5) normal  -CC       MMT Left Lower Ext    Lt Hip Flexion MMT, Gross Movement (5/5) normal  -CC    Lt Hip Extension MMT, Gross Movement (4/5) good  -CC    Lt Hip ABduction MMT, Gross Movement (4-/5) good minus  -CC    Lt Knee Extension MMT, Gross Movement (5/5) normal  -CC    Lt Knee Flexion MMT, Gross Movement (4+/5) good plus  prone  -CC    Lt Ankle Dorsiflexion MMT, Gross Movement (5/5) normal  -CC       Sensation    Sensation WNL? WNL  -CC       Pathomechanics    Pathomechanics Comments DL squat mechanics WFL; mini SL squat - unable on L, small limited range and pain on R  -CC       Balance Skills Training    SLS saurabh >10 sec, noted saurabh foot pronation  -CC       Gait/Stairs (Locomotion)    Comment, (Gait/Stairs) Barefoot ambulation reveals R toe out gait, saurabh increased pronation mildly R>L  -CC              User Key  (r) = Recorded By, (t) = Taken By, (c) = Cosigned By      Initials Name Provider Type    CC Bety Ramsay, PT Physical Therapist                                Therapy Education  Education Details: HEP [id number]; eval findings, goals of PT, POC  Given: HEP, Symptoms/condition management, Pain management  Program: New  How Provided: Verbal, Demonstration, Written  Provided to: Patient  Level of Understanding: Teach back education performed, Verbalized, Demonstrated      PT OP Goals       Row Name 03/05/24 1300          PT Short Term Goals    STG Date to Achieve 04/04/24  -     STG 1 Pt will be independent with  initial HEP to improve strength/ROM and decrease pain.  -CC     STG 1 Progress New  -CC     STG 2 Pt will report </= 1/10 pain with ADLs to improve quality of life  -CC     STG 2 Progress New  -        Long Term Goals    LTG Date to Achieve 05/04/24  -CC     LTG 1 Pt will be independent with advanced HEP to maintain strength/ROM and manage pain.  -CC     LTG 1 Progress New  -     LTG 2 Pt will be able to ascend/descend stairs in a reciprocal pattern without knee pain.  -CC     LTG 2 Progress New  -     LTG 3 Pt will improve saurabh hip strength to at least 4+/5.  -CC     LTG 3 Progress New  -CC     LTG 4 Pt will improve KOS score to at least 80% function to show improved quality of life.  -CC     LTG 4 Progress New  -     LTG 5 Pt will initiate return to running program (jog/walk intervals) with </=1/10 saurabh knee pain.  -CC     LTG 5 Progress New  -     LTG 5 Progress Comments --  -CC     LTG 6 Pt will report ability to walk downhill with </=2/10 saurabh knee pain.  -     LTG 6 Progress New  -        Time Calculation    PT Goal Re-Cert Due Date 06/03/24  -               User Key  (r) = Recorded By, (t) = Taken By, (c) = Cosigned By      Initials Name Provider Type    CC Bety Ramsay, PT Physical Therapist                     PT Assessment/Plan       Row Name 03/05/24 1300          PT Assessment    Functional Limitations Impaired gait;Impaired locomotion;Limitation in home management;Limitations in community activities;Limitations in functional capacity and performance;Performance in leisure activities;Performance in sport activities  -     Impairments Poor body mechanics;Pain;Muscle strength;Gait;Endurance  -CC     Assessment Comments Libby Huerta is a 38 y.o. female referred to physical therapy for saurabh knee pain. She presents with a stable clinical presentation, along with no remarkable comorbidities and personal factors of L shoulder pain, increased BMI  that may impact her progress in the plan  of care. Pt presents today with decrease saurabh hip strength, saurabh foot pronation through gait (R>L), and difficulty with single leg squat mechanics . Her signs and symptoms are consistent with a physical therapy diagnosis of patellar tendonitis . The previous impairments limit her ability to navigate stairs, perform ADLs, walk downhill, and run. Pt will benefit from skilled PT to address the previous impairments and return to PLOF.  -CC     Please refer to paper survey for additional self-reported information Yes  -CC     Rehab Potential Good  -CC     Patient/caregiver participated in establishment of treatment plan and goals Yes  -CC     Patient would benefit from skilled therapy intervention Yes  -CC        PT Plan    PT Frequency 2x/week;1x/week  -CC     Predicted Duration of Therapy Intervention (PT) 6-8 visits  -CC     Planned CPT's? PT EVAL LOW COMPLEXITY: 61532;PT EVAL HIGH COMPLEXITY: 26527;PT RE-EVAL: 44778;PT THER PROC EA 15 MIN: 70780;PT THER ACT EA 15 MIN: 50141;PT MANUAL THERAPY EA 15 MIN: 52578;PT NEUROMUSC RE-EDUCATION EA 15 MIN: 08412;PT GAIT TRAINING EA 15 MIN: 40955;PT SELF CARE/HOME MGMT/TRAIN EA 15: 42092;PT HOT OR COLD PACK TREAT MCARE;PT ELECTRICAL STIM UNATTEND:   -CC     PT Plan Comments Next visit: RCB, review HEP, add prone HS curl, prone hip ext with knee bent, bridge on SB with HS curl, side steps. May explore footwear, potential insert support (pt has used OTC inserts without success priviously). May also address foot/ankle/arch strength in addition to hip strength in future visits.  -CC               User Key  (r) = Recorded By, (t) = Taken By, (c) = Cosigned By      Initials Name Provider Type    Bety Quiñonez, PT Physical Therapist                       OP Exercises       Row Name 03/05/24 1300             Total Minutes    99656 - PT Therapeutic Exercise Minutes 12  -CC         Exercise 1    Exercise Name 1 RCB  -CC      Additional Comments next visit  -CC         Exercise  "2    Exercise Name 2 s/l hip abd  -CC      Cueing 2 Verbal  -CC      Sets 2 2 ea leg  -CC      Reps 2 10  -CC      Time 2 hip slight IR, foot DF  -CC         Exercise 3    Exercise Name 3 DL bridge  -CC      Cueing 3 Verbal  -CC      Sets 3 2  -CC      Reps 3 10  -CC      Time 3 cues to \"pull heels towards bottom\" to engage glutes  -CC         Exercise 4    Exercise Name 4 s/l clam  -CC      Cueing 4 Verbal  -CC      Sets 4 2 ea leg  -CC      Reps 4 10  -CC      Time 4 RTB  -CC         Exercise 5    Exercise Name 5 discussed shoe wear, potential for custom orthotics in future?  -CC                User Key  (r) = Recorded By, (t) = Taken By, (c) = Cosigned By      Initials Name Provider Type    Bety Quiñonez PT Physical Therapist                                  Outcome Measure Options: Knee Outcome Score- ADL  Knee Outcome Survey Activities of Daily Living Scale  Symptoms: Pain: The symptom affects my activity moderately  Symptoms: Stiffness: I do not have the symptom  Symptoms: Swelling: I have the symptom, but it does not affect my activity  Symptoms: Giving way, buckling, or shifting of the knee: I have the symptom, but it does not affect my activity  Symptoms: Weakness: The symptom affects my activity slightly  Symptoms: Limping: I have the symptom, but it does not affect my activity  Functional Limitations with ADL's: Walk: Activity is not difficult  Functional Limitations with ADL's: Go up stairs: Activity is fairly difficult  Functional Limitations with ADL's: Go down stairs: Activity is fairly difficult  Functional Limitations with ADL's: Stand: Activity is not difficult  Functional Limitations with ADL's: Kneel on front of your knee: Activity is fairly difficult  Functional Limitations with ADL's: Squat: Activity is somewhat difficult  Functional Limitations with ADL's: Sit with your knee bent: Activity is minimally difficult  Functional Limitations with ADL's: Rise from a chair: Activity is " minimally difficult  Knee Outcome Summary ADL's Score: 70 %      Time Calculation:     Start Time: 0919  Stop Time: 0959  Time Calculation (min): 40 min  Total Timed Code Minutes- PT: 12 minute(s)  Timed Charges  06182 - PT Therapeutic Exercise Minutes: 12  Untimed Charges  PT Eval/Re-eval Minutes: 28  Total Minutes  Timed Charges Total Minutes: 12  Untimed Charges Total Minutes: 28   Total Minutes: 40     Therapy Charges for Today       Code Description Service Date Service Provider Modifiers Qty    25847335023 HC PT THER PROC EA 15 MIN 3/5/2024 Bety Ramsay, PT GP 1    80297115146 HC PT EVAL LOW COMPLEXITY 2 3/5/2024 Bety Ramsay, PT GP 1            PT G-Codes  Outcome Measure Options: Knee Outcome Score- ADL         Bety Ramsay, PT  3/5/2024

## 2024-03-08 DIAGNOSIS — R73.03 PREDIABETES: Primary | ICD-10-CM

## 2024-03-13 ENCOUNTER — HOSPITAL ENCOUNTER (OUTPATIENT)
Dept: PHYSICAL THERAPY | Facility: HOSPITAL | Age: 39
Setting detail: THERAPIES SERIES
Discharge: HOME | End: 2024-03-13
Payer: MEDICAID

## 2024-03-13 DIAGNOSIS — M25.562 CHRONIC PAIN OF BOTH KNEES: Primary | ICD-10-CM

## 2024-03-13 DIAGNOSIS — M62.81 MUSCLE WEAKNESS: ICD-10-CM

## 2024-03-13 DIAGNOSIS — M25.561 CHRONIC PAIN OF BOTH KNEES: Primary | ICD-10-CM

## 2024-03-13 DIAGNOSIS — G89.29 CHRONIC PAIN OF BOTH KNEES: Primary | ICD-10-CM

## 2024-03-13 PROCEDURE — 97110 THERAPEUTIC EXERCISES: CPT

## 2024-03-13 NOTE — THERAPY TREATMENT NOTE
Outpatient Physical Therapy Ortho Treatment Note  [facility]     Patient Name: Libby Huerta  : 1985  MRN: [id number]  Today's Date: 3/13/2024      Visit Date: 2024    Visit Dx:    ICD-10-CM ICD-9-CM   1. Chronic pain of both knees  M25.561 719.46    M25.562 338.29    G89.29    2. Muscle weakness  M62.81 728.87       Patient Active Problem List   Diagnosis    Obesity (BMI 30-39.9)    Elevated random blood glucose level    Lymphangioleiomyomatosis    Hypokalemia    Hypertension    Dysthymia    S/P appendectomy    Toe infection    DAVID (obstructive sleep apnea)        Past Medical History:   Diagnosis Date    Anxiety     Asthma     childhood asthma        Past Surgical History:   Procedure Laterality Date    APPENDECTOMY N/A 3/14/2021    Procedure: APPENDECTOMY LAPAROSCOPIC;  Surgeon: Char Rodriguez MD;  Location: [facility] OR;  Service: General;  Laterality: N/A;    LASIK      TONSILLECTOMY                          PT Assessment/Plan       Row Name 24 1200          PT Assessment    Assessment Comments Libby Huerta is a 38 year old female returning for 1st follow up visit after evaluation reporting that she feels muscle fatigue from HEP, however no further increases in pain. Added LAQ, prone HS curl, prone hip extension (all with 3# AW), bridge with HS curl on small ball, and step up with  this date. On step up, pt. demonstrating anterior knee translation bilaterally. Increased focus spent on posture with this activity as pt. reports increased pain with going up and down ladder during home remodel. Pt. remains a good candidate for skilled PT intervention.  -ER        PT Plan    PT Plan Comments Consider lateral banded walks, mini squat at ballet bar, monster walks, mini lunge, high plank mountain climber with hip extensions?  -ER               User Key  (r) = Recorded By, (t) = Taken By, (c) = Cosigned By      Initials Name Provider Type    ER Radha Hines, PT Physical Therapist  "                      OP Exercises       Row Name 03/13/24 1100             Subjective    Subjective Comments I feel good.  -ER         Subjective Pain    Able to rate subjective pain? yes  -ER      Pre-Treatment Pain Level 3  -ER         Total Minutes    19402 - PT Therapeutic Exercise Minutes 40  -ER         Exercise 1    Exercise Name 1 RCB  -ER      Cueing 1 Verbal  -ER      Time 1 5 minutes  -ER         Exercise 2    Exercise Name 2 s/l hip abd  -ER      Cueing 2 Verbal  -ER      Sets 2 2 ea leg  -ER      Reps 2 10  -ER      Time 2 hip slight IR, foot DF  -ER         Exercise 3    Exercise Name 3 DL bridge  -ER      Cueing 3 Verbal  -ER      Sets 3 2  -ER      Reps 3 10  -ER      Time 3 cues to \"pull heels towards bottom\" to engage glutes  -ER         Exercise 4    Exercise Name 4 s/l clam  -ER      Cueing 4 Verbal  -ER      Sets 4 2 ea leg  -ER      Reps 4 10  -ER      Time 4 RTB  -ER         Exercise 6    Exercise Name 6 LAQ  -ER      Cueing 6 Verbal;Demo  -ER      Sets 6 2  -ER      Reps 6 10e  -ER      Time 6 3# AW  -ER         Exercise 7    Exercise Name 7 HS curl to bridge on small swiss ball  -ER      Cueing 7 Verbal;Demo  -ER      Sets 7 2  -ER      Reps 7 10  -ER      Additional Comments green SB  -ER         Exercise 8    Exercise Name 8 prone HS curl  -ER      Cueing 8 Verbal;Demo  -ER      Sets 8 2  -ER      Reps 8 10e  -ER      Time 8 3#  -ER         Exercise 9    Exercise Name 9 Prone hip extension  -ER      Cueing 9 Verbal;Demo  -ER      Sets 9 2  -ER      Reps 9 10e  -ER      Time 9 3# AW  -ER         Exercise 10    Exercise Name 10 Step up with knee   -ER      Cueing 10 Verbal;Demo  -ER      Sets 10 2  -ER      Reps 10 10e  -ER      Time 10 3# AW  -ER                User Key  (r) = Recorded By, (t) = Taken By, (c) = Cosigned By      Initials Name Provider Type    ER Radha Hines, PT Physical Therapist                                  PT OP Goals       Row Name 03/13/24 1200          PT " Short Term Goals    STG Date to Achieve 04/04/24  -ER     STG 1 Pt will be independent with initial HEP to improve strength/ROM and decrease pain.  -ER     STG 1 Progress New  -ER     STG 2 Pt will report </= 1/10 pain with ADLs to improve quality of life  -ER     STG 2 Progress New  -ER        Long Term Goals    LTG Date to Achieve 05/04/24  -ER     LTG 1 Pt will be independent with advanced HEP to maintain strength/ROM and manage pain.  -ER     LTG 1 Progress New  -ER     LTG 2 Pt will be able to ascend/descend stairs in a reciprocal pattern without knee pain.  -ER     LTG 2 Progress New  -ER     LTG 3 Pt will improve saurabh hip strength to at least 4+/5.  -ER     LTG 3 Progress New  -ER     LTG 4 Pt will improve KOS score to at least 80% function to show improved quality of life.  -ER     LTG 4 Progress New  -ER     LTG 5 Pt will initiate return to running program (jog/walk intervals) with </=1/10 saurabh knee pain.  -ER     LTG 5 Progress New  -ER     LTG 6 Pt will report ability to walk downhill with </=2/10 saurabh knee pain.  -ER     LTG 6 Progress New  -ER               User Key  (r) = Recorded By, (t) = Taken By, (c) = Cosigned By      Initials Name Provider Type    Radha Poe PT Physical Therapist                    Therapy Education  Education Details: Progressed HEP, discussed body mechanics  Given: HEP, Posture/body mechanics  Program: Reinforced, Progressed  How Provided: Verbal, Demonstration, Written  Provided to: Patient  Level of Understanding: Teach back education performed, Verbalized, Demonstrated              Time Calculation:   Start Time: 1130  Stop Time: 1210  Time Calculation (min): 40 min  Total Timed Code Minutes- PT: 40 minute(s)  Timed Charges  82353 - PT Therapeutic Exercise Minutes: 40  Total Minutes  Timed Charges Total Minutes: 40   Total Minutes: 40  Therapy Charges for Today       Code Description Service Date Service Provider Modifiers Qty    14168647572 HC PT THER PROC EA 15 MIN  3/13/2024 Radha Hines, PT GP 3                      Radha Hines, PT  3/13/2024

## 2024-03-18 ENCOUNTER — HOSPITAL ENCOUNTER (OUTPATIENT)
Dept: PHYSICAL THERAPY | Facility: HOSPITAL | Age: 39
Setting detail: THERAPIES SERIES
Discharge: HOME | End: 2024-03-18
Payer: MEDICAID

## 2024-03-18 DIAGNOSIS — M25.562 CHRONIC PAIN OF BOTH KNEES: Primary | ICD-10-CM

## 2024-03-18 DIAGNOSIS — G89.29 CHRONIC PAIN OF BOTH KNEES: Primary | ICD-10-CM

## 2024-03-18 DIAGNOSIS — M62.81 MUSCLE WEAKNESS: ICD-10-CM

## 2024-03-18 DIAGNOSIS — M25.561 CHRONIC PAIN OF BOTH KNEES: Primary | ICD-10-CM

## 2024-03-18 PROCEDURE — 97110 THERAPEUTIC EXERCISES: CPT

## 2024-03-18 NOTE — THERAPY TREATMENT NOTE
Outpatient Physical Therapy Ortho Treatment Note  [facility]     Patient Name: Libby Huerta  : 1985  MRN: [id number]  Today's Date: 3/18/2024      Visit Date: 2024    Visit Dx:    ICD-10-CM ICD-9-CM   1. Chronic pain of both knees  M25.561 719.46    M25.562 338.29    G89.29    2. Muscle weakness  M62.81 728.87       Patient Active Problem List   Diagnosis    Obesity (BMI 30-39.9)    Elevated random blood glucose level    Lymphangioleiomyomatosis    Hypokalemia    Hypertension    Dysthymia    S/P appendectomy    Toe infection    DAVID (obstructive sleep apnea)        Past Medical History:   Diagnosis Date    Anxiety     Asthma     childhood asthma        Past Surgical History:   Procedure Laterality Date    APPENDECTOMY N/A 3/14/2021    Procedure: APPENDECTOMY LAPAROSCOPIC;  Surgeon: Char Rodriguez MD;  Location: [facility];  Service: General;  Laterality: N/A;    LASIK      TONSILLECTOMY                          PT Assessment/Plan       Row Name 24 1200          PT Assessment    Assessment Comments Libby Huerta returns for physical therapy treatment session for bilateral knee pain. Today, she reports that she feels like most of her pain is from stepping incorrectly. Time spent discussing and analyzing gait mechanics to assess response. Pt. demonstrates R sided out toeing while walking as well as genu valgus bilaterally. Discussed hip ABD while performing STS and ambulating with toes forward to reduced strain on medial knee. Added GTB to s/l clams for additional challenge as well. Pt. remains a good candidate for skilled PT.  -ER        PT Plan    PT Plan Comments Follow up on [employer] feet? Consider lateral banded walks, STS with small ball between knees, mini squat at ballet barre, monster walks, high plank mountain climber with hip extension?  -ER               User Key  (r) = Recorded By, (t) = Taken By, (c) = Cosigned By      Initials Name Provider Type    ER Radha Hines, PT  "Physical Therapist                       OP Exercises       Row Name 03/18/24 1100             Subjective    Subjective Comments I feel like most of my knee pain is from stepping incorrectly.  -ER         Subjective Pain    Able to rate subjective pain? yes  -ER      Pre-Treatment Pain Level 2  -ER         Total Minutes    65207 - PT Therapeutic Exercise Minutes 45  -ER         Exercise 1    Exercise Name 1 RCB  -ER      Cueing 1 Verbal  -ER      Time 1 5 minutes  -ER         Exercise 2    Exercise Name 2 s/l hip abd  -ER      Cueing 2 Verbal  -ER      Sets 2 2 ea leg  -ER      Reps 2 10  -ER      Time 2 hip slight IR, foot DF  -ER         Exercise 3    Exercise Name 3 DL bridge  -ER      Cueing 3 Verbal  -ER      Sets 3 2  -ER      Reps 3 10  -ER      Time 3 cues to \"pull heels towards bottom\" to engage glutes  -ER         Exercise 4    Exercise Name 4 s/l clam  -ER      Cueing 4 Verbal  -ER      Sets 4 2 ea leg  -ER      Reps 4 10  -ER      Time 4 GTB  -ER         Exercise 6    Exercise Name 6 LAQ  -ER      Cueing 6 Verbal;Demo  -ER      Sets 6 2  -ER      Reps 6 10e  -ER      Time 6 3# AW  -ER         Exercise 7    Exercise Name 7 HS curl to bridge on small swiss ball  -ER      Cueing 7 Verbal;Demo  -ER      Sets 7 2  -ER      Reps 7 10  -ER      Additional Comments green SB  -ER         Exercise 8    Exercise Name 8 prone HS curl  -ER      Cueing 8 Verbal;Demo  -ER      Sets 8 2  -ER      Reps 8 10e  -ER      Time 8 3#  -ER         Exercise 9    Exercise Name 9 Prone hip extension  -ER      Cueing 9 Verbal;Demo  -ER      Sets 9 2  -ER      Reps 9 10e  -ER      Time 9 3# AW  -ER         Exercise 11    Exercise Name 11 Gait in clinic  -ER      Time 11 5 minutes  -ER                User Key  (r) = Recorded By, (t) = Taken By, (c) = Cosigned By      Initials Name Provider Type    ER Radha Hines, PT Physical Therapist                                  PT OP Goals       Row Name 03/18/24 1200          PT Short Term " Goals    STG Date to Achieve 04/04/24  -ER     STG 1 Pt will be independent with initial HEP to improve strength/ROM and decrease pain.  -ER     STG 1 Progress New  -ER     STG 2 Pt will report </= 1/10 pain with ADLs to improve quality of life  -ER     STG 2 Progress New  -ER        Long Term Goals    LTG Date to Achieve 05/04/24  -ER     LTG 1 Pt will be independent with advanced HEP to maintain strength/ROM and manage pain.  -ER     LTG 1 Progress New  -ER     LTG 2 Pt will be able to ascend/descend stairs in a reciprocal pattern without knee pain.  -ER     LTG 2 Progress New  -ER     LTG 3 Pt will improve saurabh hip strength to at least 4+/5.  -ER     LTG 3 Progress New  -ER     LTG 4 Pt will improve KOS score to at least 80% function to show improved quality of life.  -ER     LTG 4 Progress New  -ER     LTG 5 Pt will initiate return to running program (jog/walk intervals) with </=1/10 saurabh knee pain.  -ER     LTG 5 Progress New  -ER     LTG 6 Pt will report ability to walk downhill with </=2/10 saurabh knee pain.  -ER     LTG 6 Progress New  -ER               User Key  (r) = Recorded By, (t) = Taken By, (c) = Cosigned By      Initials Name Provider Type    Radha Poe PT Physical Therapist                    Therapy Education  Education Details: Reinforced HEP, discussed gait mechanics and fleet feet for pressure mapping  Given: HEP, Posture/body mechanics, Fall prevention and home safety  Program: Reinforced  How Provided: Verbal, Demonstration  Provided to: Patient  Level of Understanding: Teach back education performed, Verbalized, Demonstrated              Time Calculation:   Start Time: 1130  Stop Time: 1215  Time Calculation (min): 45 min  Total Timed Code Minutes- PT: 45 minute(s)  Timed Charges  53609 - PT Therapeutic Exercise Minutes: 45  Total Minutes  Timed Charges Total Minutes: 45   Total Minutes: 45  Therapy Charges for Today       Code Description Service Date Service Provider Modifiers Qty     [id number]  PT THER PROC EA 15 MIN 3/18/2024 Radha Hines, PT GP 3                      Radha Hines, PT  3/18/2024

## 2024-03-20 ENCOUNTER — HOSPITAL ENCOUNTER (OUTPATIENT)
Dept: PHYSICAL THERAPY | Facility: HOSPITAL | Age: 39
Setting detail: THERAPIES SERIES
Discharge: HOME | End: 2024-03-20
Payer: MEDICAID

## 2024-03-20 DIAGNOSIS — M62.81 MUSCLE WEAKNESS: ICD-10-CM

## 2024-03-20 DIAGNOSIS — G89.29 CHRONIC PAIN OF BOTH KNEES: Primary | ICD-10-CM

## 2024-03-20 DIAGNOSIS — M25.562 CHRONIC PAIN OF BOTH KNEES: Primary | ICD-10-CM

## 2024-03-20 DIAGNOSIS — M25.561 CHRONIC PAIN OF BOTH KNEES: Primary | ICD-10-CM

## 2024-03-20 PROCEDURE — 97110 THERAPEUTIC EXERCISES: CPT

## 2024-03-20 NOTE — THERAPY TREATMENT NOTE
Outpatient Physical Therapy Ortho Treatment Note  [facility]     Patient Name: Libby Huerta  : 1985  MRN: [id number]  Today's Date: 3/20/2024      Visit Date: 2024    Visit Dx:    ICD-10-CM ICD-9-CM   1. Chronic pain of both knees  M25.561 719.46    M25.562 338.29    G89.29    2. Muscle weakness  M62.81 728.87       Patient Active Problem List   Diagnosis    Obesity (BMI 30-39.9)    Elevated random blood glucose level    Lymphangioleiomyomatosis    Hypokalemia    Hypertension    Dysthymia    S/P appendectomy    Toe infection    DAVID (obstructive sleep apnea)        Past Medical History:   Diagnosis Date    Anxiety     Asthma     childhood asthma        Past Surgical History:   Procedure Laterality Date    APPENDECTOMY N/A 3/14/2021    Procedure: APPENDECTOMY LAPAROSCOPIC;  Surgeon: Char Rodriguez MD;  Location: [facility] OR;  Service: General;  Laterality: N/A;    LASIK      TONSILLECTOMY                          PT Assessment/Plan       Row Name 24 1300          PT Assessment    Assessment Comments Libby returns today with reports of slight improvements in knee pain since starting therapy. Review joint pathomechanics, and stress on medial knee joint related to lateral hip weakness, and over-pronation of saurabh foot (R>L). Focused on progression of hip strength challenges and addition of arch strengthening. Discussed squat mechanics, and engaging big toe, little toe, and heel into ground evenly. Pt demos difficulty with posterior weight shift/hip hinge. Reports foot/arch fatigue following. Updated HEP.  -CC        PT Plan    PT Plan Comments Consider SLS with TB under ball of foot and pt holding other end to engage medial arch; review squat form and work on hip hinge/posterior weight shift.  -CC               User Key  (r) = Recorded By, (t) = Taken By, (c) = Cosigned By      Initials Name Provider Type    Bety Quiñonez, PT Physical Therapist                       OP Exercises   Vitals are Temp: 97.3  F (36.3  C) Temp src: Oral BP: 137/61 Pulse: 60   Resp: 18 SpO2: 91 %.    Alert and Oriented x4. Pt is 2 assist.   Complaining of pain all over. Scheduled MS Contin and PRN percocet given. Patient is Saline locked. Room air, CPAP at night. On Levaquin for UTI. Pt refused BP's, gets agitated and yells. Refused new purewick this AM.     ERCP completed yesterday. NPO since midnight for possible lap kirby today. However, pt states she does not want procedure. Will continue to monitor.      "     Row Name 03/20/24 1100             Subjective    Subjective Comments Feels like the execises are helping, hasn't had a chance to go to [employer] yet d/t busy schedule  -CC         Subjective Pain    Able to rate subjective pain? yes  -CC      Pre-Treatment Pain Level 0  -CC      Subjective Pain Comment \"just tender\"  -CC         Total Minutes    81607 - PT Therapeutic Exercise Minutes 40  -CC         Exercise 1    Exercise Name 1 nu step  -CC      Time 1 5 min, lvl 6  -CC      Additional Comments RCB not available  -CC         Exercise 2    Exercise Name 2 s/l hip abd with ball push into wall  -CC      Cueing 2 Verbal  -CC      Sets 2 2 ea leg  -CC      Reps 2 10 quick up and down, 10 quick side to side  -CC      Time 2 small red ball between heel and wall, small range quick movements of leg  -CC         Exercise 3    Exercise Name 3 fig 4 SL bridge  -CC      Cueing 3 Verbal  -CC      Sets 3 2 ea leg  -CC      Reps 3 10  -CC      Time 3 cues to \"pull heels towards bottom\" to engage glutes  -CC         Exercise 4    Exercise Name 4 qped fire hydrant  -CC      Cueing 4 Verbal  -CC      Sets 4 --  -CC      Reps 4 attempted, held due to pain with kneeling on knee  -CC         Exercise 5    Exercise Name 5 arch doming, seated  -CC      Cueing 5 Verbal  -CC      Sets 5 2 ea foot  -CC      Reps 5 10  -CC      Time 5 5s  -CC         Exercise 8    Exercise Name 8 monster walk  -CC      Cueing 8 Verbal  -CC      Reps 8 4 laps  -CC      Time 8 RTB knees  -CC      Additional Comments cues for knees unlocked, avoiding overpronation/genu valgus  -CC         Exercise 9    Exercise Name 9 side steps  -CC      Cueing 9 Verbal  -CC      Reps 9 4 laps  -CC      Time 9 RTB  -CC      Additional Comments cues for knees unlocked, toes fwd, keeping tension on band  -CC         Exercise 11    Exercise Name 11 squat with arch engagement  -CC      Cueing 11 Verbal  -CC      Sets 11 2  -CC      Reps 11 10  -CC      Time 11 RTB knees  -CC "      Additional Comments cues for toes forward, cues to distribute weight between big and little toe/push big toe into ground to engage arch, fingertips on barre for counterbalance  -CC         Exercise 12    Exercise Name 12 standing hip ext to ham curl  -CC      Cueing 12 Verbal  -CC      Reps 12 2x10 ea leg  -CC                User Key  (r) = Recorded By, (t) = Taken By, (c) = Cosigned By      Initials Name Provider Type    CC Bety Ramsay, PT Physical Therapist                                     Therapy Education  Education Details: updated HEP  Given: HEP, Symptoms/condition management  Program: Reinforced, Progressed  How Provided: Verbal, Demonstration, Written  Provided to: Patient  Level of Understanding: Teach back education performed, Verbalized, Demonstrated              Time Calculation:   Start Time: 1135  Stop Time: 1215  Time Calculation (min): 40 min  Total Timed Code Minutes- PT: 40 minute(s)  Timed Charges  78089 - PT Therapeutic Exercise Minutes: 40  Total Minutes  Timed Charges Total Minutes: 40   Total Minutes: 40  Therapy Charges for Today       Code Description Service Date Service Provider Modifiers Qty    77005737280 HC PT THER PROC EA 15 MIN 3/20/2024 Bety Ramsay, PT GP 3                      Bety Ramsay, PINA  3/20/2024

## 2024-03-22 ENCOUNTER — OFFICE VISIT (OUTPATIENT)
Dept: ENDOCRINOLOGY | Age: 39
End: 2024-03-22
Payer: MEDICAID

## 2024-03-22 VITALS
DIASTOLIC BLOOD PRESSURE: 84 MMHG | OXYGEN SATURATION: 97 % | HEART RATE: 92 BPM | HEIGHT: 65 IN | WEIGHT: 225.8 LBS | BODY MASS INDEX: 37.62 KG/M2 | SYSTOLIC BLOOD PRESSURE: 118 MMHG

## 2024-03-22 DIAGNOSIS — E28.2 PCOS (POLYCYSTIC OVARIAN SYNDROME): ICD-10-CM

## 2024-03-22 DIAGNOSIS — E66.9 OBESITY (BMI 30-39.9): Primary | ICD-10-CM

## 2024-03-22 DIAGNOSIS — R73.03 PRE-DIABETES: ICD-10-CM

## 2024-03-22 RX ORDER — MV-MN/FOLATE 11/M.THISTLE/HERB 72.3MCGDFE
CAPSULE ORAL
COMMUNITY

## 2024-03-22 NOTE — PROGRESS NOTES
Referring provider: Roselia Funes MD     Chief complaint/Reason for consult:  Prediabetes    HPI:   - 38 year old here for prediabetes  - She states she has already changed her diet in the last 2 weeks and is feeling better    The following portions of the patient's history were reviewed and updated as appropriate: allergies, current medications, past family history, past medical history, past social history, past surgical history, and problem list.    Objective     Vitals:    03/22/24 1138   BP: 118/84   Pulse: 92   SpO2: 97%        Physical Exam  Vitals reviewed.   Constitutional:       Appearance: Normal appearance.   HENT:      Head: Normocephalic and atraumatic.   Eyes:      General: No scleral icterus.  Pulmonary:      Effort: Pulmonary effort is normal. No respiratory distress.   Neurological:      Mental Status: She is alert.      Gait: Gait normal.   Psychiatric:         Mood and Affect: Mood normal.         Behavior: Behavior normal.         Thought Content: Thought content normal.         Judgment: Judgment normal.         Labs/Imaging:  Reviewed her A1c of 5.7%, DHEA-S, and free testosterone were slightly elevated    Assessment & Plan   Prediabetes  Obesity (BMI of 37)  PCOS  - Discussed lifestyle modification to help with weight loss and reduce chances of developing T2DM    - Return to clinic PRN

## 2024-03-25 ENCOUNTER — APPOINTMENT (OUTPATIENT)
Dept: PHYSICAL THERAPY | Facility: HOSPITAL | Age: 39
End: 2024-03-25
Payer: MEDICAID

## 2024-03-27 ENCOUNTER — APPOINTMENT (OUTPATIENT)
Dept: PHYSICAL THERAPY | Facility: HOSPITAL | Age: 39
End: 2024-03-27
Payer: MEDICAID

## 2024-08-23 ENCOUNTER — OFFICE VISIT (OUTPATIENT)
Dept: FAMILY MEDICINE CLINIC | Facility: CLINIC | Age: 39
End: 2024-08-23
Payer: MEDICAID

## 2024-08-23 VITALS
SYSTOLIC BLOOD PRESSURE: 122 MMHG | HEART RATE: 102 BPM | DIASTOLIC BLOOD PRESSURE: 80 MMHG | HEIGHT: 65 IN | TEMPERATURE: 98 F | WEIGHT: 224.6 LBS | OXYGEN SATURATION: 97 % | BODY MASS INDEX: 37.42 KG/M2

## 2024-08-23 DIAGNOSIS — L08.9 TOE INFECTION: ICD-10-CM

## 2024-08-23 DIAGNOSIS — I10 HYPERTENSION, UNSPECIFIED TYPE: ICD-10-CM

## 2024-08-23 DIAGNOSIS — R73.03 PREDIABETES: ICD-10-CM

## 2024-08-23 DIAGNOSIS — E78.5 HYPERLIPIDEMIA, UNSPECIFIED HYPERLIPIDEMIA TYPE: ICD-10-CM

## 2024-08-23 DIAGNOSIS — I10 PRIMARY HYPERTENSION: ICD-10-CM

## 2024-08-23 DIAGNOSIS — R41.840 INATTENTION: ICD-10-CM

## 2024-08-23 DIAGNOSIS — E28.2 PCOS (POLYCYSTIC OVARIAN SYNDROME): Primary | ICD-10-CM

## 2024-08-23 DIAGNOSIS — F41.9 ANXIETY: ICD-10-CM

## 2024-08-23 PROCEDURE — 99214 OFFICE O/P EST MOD 30 MIN: CPT | Performed by: STUDENT IN AN ORGANIZED HEALTH CARE EDUCATION/TRAINING PROGRAM

## 2024-08-23 PROCEDURE — 1160F RVW MEDS BY RX/DR IN RCRD: CPT | Performed by: STUDENT IN AN ORGANIZED HEALTH CARE EDUCATION/TRAINING PROGRAM

## 2024-08-23 PROCEDURE — 3079F DIAST BP 80-89 MM HG: CPT | Performed by: STUDENT IN AN ORGANIZED HEALTH CARE EDUCATION/TRAINING PROGRAM

## 2024-08-23 PROCEDURE — 3074F SYST BP LT 130 MM HG: CPT | Performed by: STUDENT IN AN ORGANIZED HEALTH CARE EDUCATION/TRAINING PROGRAM

## 2024-08-23 PROCEDURE — 1159F MED LIST DOCD IN RCRD: CPT | Performed by: STUDENT IN AN ORGANIZED HEALTH CARE EDUCATION/TRAINING PROGRAM

## 2024-08-23 RX ORDER — METFORMIN HYDROCHLORIDE 500 MG/1
500 TABLET, EXTENDED RELEASE ORAL
Qty: 90 TABLET | Refills: 3 | Status: SHIPPED | OUTPATIENT
Start: 2024-08-23

## 2024-08-23 RX ORDER — LISINOPRIL 10 MG/1
10 TABLET ORAL DAILY
Qty: 90 TABLET | Refills: 3 | Status: SHIPPED | OUTPATIENT
Start: 2024-08-23

## 2024-08-23 NOTE — PROGRESS NOTES
"Chief Complaint  Hypertension    Subjective        Libby Huerta presents to [facility]  History of Present Illness    Here today for blood pressure going up and down. BP sometimes is 155/100. This usually happens at times when she is more stressed. Feels she lives a really stressful life and is trying to change that. Did have high blood pressure for a week and then took more of her lisinopril. Stopped drinking. Having issues with weight. Does eat healthy. Makes her foods at home.   Had referral to gyn to discuss PCOS. DHEA levels were high. Did not go to her appointment because she had a period. Was on depo for 12 years and does not want to try a hormonal birth control option.     Objective   Vital Signs:  /80   Pulse 102   Temp 98 °F (36.7 °C)   Ht 165.1 cm (65\")   Wt 102 kg (224 lb 9.6 oz)   SpO2 97%   BMI 37.38 kg/m²   Estimated body mass index is 37.38 kg/m² as calculated from the following:    Height as of this encounter: 165.1 cm (65\").    Weight as of this encounter: 102 kg (224 lb 9.6 oz).            Physical Exam  Vitals and nursing note reviewed.   Constitutional:       General: She is not in acute distress.     Appearance: Normal appearance. She is not ill-appearing.   HENT:      Head: Normocephalic and atraumatic.      Nose: Nose normal.      Mouth/Throat:      Mouth: Mucous membranes are moist.   Eyes:      Extraocular Movements: Extraocular movements intact.      Conjunctiva/sclera: Conjunctivae normal.   Cardiovascular:      Rate and Rhythm: Normal rate and regular rhythm.      Heart sounds: Normal heart sounds. No murmur heard.     No gallop.   Pulmonary:      Effort: Pulmonary effort is normal. No respiratory distress.      Breath sounds: Normal breath sounds. No stridor. No wheezing, rhonchi or rales.   Chest:      Chest wall: No tenderness.   Skin:     General: Skin is warm and dry.   Neurological:      General: No focal deficit present.      Mental " Status: She is alert and oriented to person, place, and time. Mental status is at baseline.   Psychiatric:         Mood and Affect: Mood normal.         Behavior: Behavior normal.        Result Review :                Assessment and Plan   Diagnoses and all orders for this visit:    1. PCOS (polycystic ovarian syndrome) (Primary)  -     Insulin, Total  -     US Pelvis Complete; Future  -     metFORMIN ER (GLUCOPHAGE-XR) 500 MG 24 hr tablet; Take 1 tablet by mouth Daily With Breakfast.  Dispense: 90 tablet; Refill: 3    2. Primary hypertension  -     Aldosterone / Renin Ratio  -     metoprolol tartrate (LOPRESSOR) 25 MG tablet; Take 1 tablet by mouth 2 (Two) Times a Day As Needed (anxiety/hypertension).  Dispense: 30 tablet; Refill: 0    3. Inattention  -     Ambulatory Referral to Psychiatry    4. Prediabetes  -     metFORMIN ER (GLUCOPHAGE-XR) 500 MG 24 hr tablet; Take 1 tablet by mouth Daily With Breakfast.  Dispense: 90 tablet; Refill: 3    5. Hypertension, unspecified type  -     lisinopril (PRINIVIL,ZESTRIL) 10 MG tablet; Take 1 tablet by mouth Daily.  Dispense: 90 tablet; Refill: 3    6. Anxiety  -     metoprolol tartrate (LOPRESSOR) 25 MG tablet; Take 1 tablet by mouth 2 (Two) Times a Day As Needed (anxiety/hypertension).  Dispense: 30 tablet; Refill: 0  -     Ambulatory Referral to Behavioral Health    7. Hyperlipidemia, unspecified hyperlipidemia type  -     Lipid panel    8. Toe infection  -     Ambulatory Referral to Podiatry    Increase lisinopril to 10 mg daily.  Provide as needed metoprolol to take if having symptoms and elevated blood pressure.  Hopefully this will help with her anxiety and her blood pressure.  Consider starting metformin to help with PCOS and prediabetes.  Patient plans to read information provided prior to starting medication.  Ordered pelvic ultrasound to evaluate for cysts.  Patient to reschedule appointment with gynecology.  Referral placed for ADHD evaluation.  New referral  placed for cognitive behavioral therapy with a therapist for anxiety.         Follow Up   Return in about 3 months (around 11/23/2024) for Chronic care.  Patient was given instructions and counseling regarding her condition or for health maintenance advice. Please see specific information pulled into the AVS if appropriate.

## 2024-09-01 LAB
ALDOST SERPL-MCNC: 20 NG/DL (ref 0–30)
ALDOST/RENIN PLAS-RTO: 2.7 {RATIO} (ref 0–30)
CHOLEST SERPL-MCNC: 131 MG/DL (ref 100–199)
HDLC SERPL-MCNC: 37 MG/DL
INSULIN SERPL-ACNC: 20.3 UIU/ML (ref 2.6–24.9)
LDLC SERPL CALC-MCNC: 70 MG/DL (ref 0–99)
RENIN PLAS-CCNC: 7.4 NG/ML/HR (ref 0.17–5.38)
TRIGL SERPL-MCNC: 134 MG/DL (ref 0–149)
VLDLC SERPL CALC-MCNC: 24 MG/DL (ref 5–40)

## 2024-09-28 DIAGNOSIS — I10 HYPERTENSION, UNSPECIFIED TYPE: ICD-10-CM

## 2024-09-30 RX ORDER — LISINOPRIL 5 MG/1
5 TABLET ORAL DAILY
Qty: 90 TABLET | Refills: 1 | OUTPATIENT
Start: 2024-09-30

## 2024-10-02 DIAGNOSIS — E28.2 PCOS (POLYCYSTIC OVARIAN SYNDROME): ICD-10-CM

## 2024-10-02 DIAGNOSIS — I10 HYPERTENSION, UNSPECIFIED TYPE: ICD-10-CM

## 2024-10-02 DIAGNOSIS — R73.03 PREDIABETES: ICD-10-CM

## 2024-10-02 DIAGNOSIS — I10 PRIMARY HYPERTENSION: ICD-10-CM

## 2024-10-02 DIAGNOSIS — R41.840 INATTENTION: Primary | ICD-10-CM

## 2024-10-02 DIAGNOSIS — F41.9 ANXIETY: ICD-10-CM

## 2024-10-02 RX ORDER — METOPROLOL TARTRATE 25 MG/1
25 TABLET, FILM COATED ORAL 2 TIMES DAILY PRN
Qty: 30 TABLET | Refills: 0 | Status: SHIPPED | OUTPATIENT
Start: 2024-10-02

## 2024-10-02 RX ORDER — METFORMIN HCL 500 MG
500 TABLET, EXTENDED RELEASE 24 HR ORAL
Qty: 90 TABLET | Refills: 3 | Status: SHIPPED | OUTPATIENT
Start: 2024-10-02

## 2024-10-02 RX ORDER — LISINOPRIL 10 MG/1
10 TABLET ORAL DAILY
Qty: 90 TABLET | Refills: 3 | Status: SHIPPED | OUTPATIENT
Start: 2024-10-02

## 2024-10-14 DIAGNOSIS — I10 PRIMARY HYPERTENSION: ICD-10-CM

## 2024-10-14 DIAGNOSIS — F41.9 ANXIETY: ICD-10-CM

## 2024-10-14 RX ORDER — METOPROLOL TARTRATE 25 MG/1
TABLET, FILM COATED ORAL
Qty: 30 TABLET | Refills: 0 | Status: SHIPPED | OUTPATIENT
Start: 2024-10-14

## 2024-11-04 DIAGNOSIS — I10 PRIMARY HYPERTENSION: ICD-10-CM

## 2024-11-04 DIAGNOSIS — F41.9 ANXIETY: ICD-10-CM

## 2024-11-04 RX ORDER — METOPROLOL TARTRATE 25 MG/1
TABLET, FILM COATED ORAL
Qty: 30 TABLET | Refills: 0 | Status: SHIPPED | OUTPATIENT
Start: 2024-11-04

## 2024-12-11 DIAGNOSIS — I10 HYPERTENSION, UNSPECIFIED TYPE: ICD-10-CM

## 2024-12-11 DIAGNOSIS — R41.840 INATTENTION: Primary | ICD-10-CM

## 2024-12-11 RX ORDER — ALBUTEROL SULFATE 90 UG/1
2 INHALANT RESPIRATORY (INHALATION) EVERY 4 HOURS PRN
Qty: 18 G | Refills: 0 | Status: CANCELLED | OUTPATIENT
Start: 2024-12-11

## 2024-12-11 RX ORDER — LISINOPRIL 10 MG/1
10 TABLET ORAL DAILY
Qty: 90 TABLET | Refills: 3 | Status: SHIPPED | OUTPATIENT
Start: 2024-12-11

## 2024-12-12 RX ORDER — ALBUTEROL SULFATE 90 UG/1
2 INHALANT RESPIRATORY (INHALATION) EVERY 4 HOURS PRN
Qty: 18 G | Refills: 0 | Status: SHIPPED | OUTPATIENT
Start: 2024-12-12

## 2025-06-04 ENCOUNTER — OFFICE VISIT (OUTPATIENT)
Dept: FAMILY MEDICINE CLINIC | Facility: CLINIC | Age: 40
End: 2025-06-04
Payer: MEDICAID

## 2025-06-04 VITALS
SYSTOLIC BLOOD PRESSURE: 138 MMHG | OXYGEN SATURATION: 97 % | HEIGHT: 65 IN | DIASTOLIC BLOOD PRESSURE: 88 MMHG | HEART RATE: 96 BPM | WEIGHT: 231.8 LBS | BODY MASS INDEX: 38.62 KG/M2

## 2025-06-04 DIAGNOSIS — L23.7 POISON IVY DERMATITIS: Primary | ICD-10-CM

## 2025-06-04 PROCEDURE — 3079F DIAST BP 80-89 MM HG: CPT

## 2025-06-04 PROCEDURE — 3075F SYST BP GE 130 - 139MM HG: CPT

## 2025-06-04 PROCEDURE — 1159F MED LIST DOCD IN RCRD: CPT

## 2025-06-04 PROCEDURE — 99213 OFFICE O/P EST LOW 20 MIN: CPT

## 2025-06-04 PROCEDURE — 1160F RVW MEDS BY RX/DR IN RCRD: CPT

## 2025-06-04 RX ORDER — PREDNISONE 20 MG/1
TABLET ORAL
Qty: 20 TABLET | Refills: 0 | Status: SHIPPED | OUTPATIENT
Start: 2025-06-04 | End: 2025-06-16

## 2025-06-04 RX ORDER — TRIAMCINOLONE ACETONIDE 1 MG/G
1 CREAM TOPICAL 2 TIMES DAILY
Qty: 45 G | Refills: 1 | Status: SHIPPED | OUTPATIENT
Start: 2025-06-04

## 2025-06-04 NOTE — PROGRESS NOTES
"Chief Complaint  Rash (Blisters all over body )    Subjective          History of Present Illness      Rash  Blisters all over her body.  Started 1 week ago  Getting worse, starting to spread more.  Reports she has been gardening outside recently unsure if she came into contact with poison ivy.  Rash currently on chest, Abdomen, bilateral arms and  legs. Left fore arm and abdomen the worst, see pictures below.  Treatment: benadryl spray no relief.  Last year towards end of summer had a similar episode.                  Objective   Vital Signs:  /88 (BP Location: Left arm, Patient Position: Sitting, Cuff Size: Adult)   Pulse 96   Ht 165.1 cm (65\")   Wt 105 kg (231 lb 12.8 oz)   SpO2 97%   BMI 38.57 kg/m²   Estimated body mass index is 38.57 kg/m² as calculated from the following:    Height as of this encounter: 165.1 cm (65\").    Weight as of this encounter: 105 kg (231 lb 12.8 oz).          Physical Exam  Vitals reviewed.   Constitutional:       General: She is not in acute distress.     Appearance: Normal appearance.   HENT:      Head: Normocephalic and atraumatic.      Mouth/Throat:      Mouth: Mucous membranes are moist.      Pharynx: No oropharyngeal exudate or posterior oropharyngeal erythema.   Eyes:      Conjunctiva/sclera: Conjunctivae normal.      Pupils: Pupils are equal, round, and reactive to light.   Cardiovascular:      Rate and Rhythm: Normal rate and regular rhythm.      Pulses: Normal pulses.      Heart sounds: No murmur heard.     No gallop.   Pulmonary:      Effort: Pulmonary effort is normal. No respiratory distress.      Breath sounds: Normal breath sounds. No wheezing.   Abdominal:      General: Bowel sounds are normal. There is no distension.      Palpations: Abdomen is soft.      Tenderness: There is no abdominal tenderness.   Musculoskeletal:         General: Normal range of motion.      Cervical back: Normal range of motion and neck supple. No tenderness.   Skin:     General: " Skin is warm and dry.      Findings: Rash present.      Comments: See pictures in chart.   Neurological:      Mental Status: She is alert and oriented to person, place, and time. Mental status is at baseline.   Psychiatric:         Mood and Affect: Mood normal.        Result Review :                Assessment and Plan   Diagnoses and all orders for this visit:    1. Poison ivy dermatitis (Primary)  -     predniSONE (DELTASONE) 20 MG tablet; Take 1 tablet by mouth 3 (Three) Times a Day for 3 days, THEN 1 tablet 2 (Two) Times a Day for 3 days, THEN 1 tablet Daily for 3 days, THEN 0.5 tablets Daily for 3 days.  Dispense: 20 tablet; Refill: 0  -     triamcinolone (KENALOG) 0.1 % cream; Apply 1 Application topically to the appropriate area as directed 2 (Two) Times a Day.  Dispense: 45 g; Refill: 1    Discussed new medication and s/e.  Declined Kenalog injection today. Will let me know if oral steroids are not helpful, may come back in for MA appt for injection if needed.       Follow Up   Return if symptoms worsen or fail to improve.  Patient was given instructions and counseling regarding her condition or for health maintenance advice. Please see specific information pulled into the AVS if appropriate.

## 2025-06-18 ENCOUNTER — OFFICE VISIT (OUTPATIENT)
Dept: FAMILY MEDICINE CLINIC | Facility: CLINIC | Age: 40
End: 2025-06-18
Payer: MEDICAID

## 2025-06-18 VITALS
DIASTOLIC BLOOD PRESSURE: 78 MMHG | OXYGEN SATURATION: 98 % | SYSTOLIC BLOOD PRESSURE: 120 MMHG | WEIGHT: 229.4 LBS | BODY MASS INDEX: 38.22 KG/M2 | HEIGHT: 65 IN | HEART RATE: 101 BPM

## 2025-06-18 DIAGNOSIS — R53.83 OTHER FATIGUE: ICD-10-CM

## 2025-06-18 DIAGNOSIS — R41.89 BRAIN FOG: ICD-10-CM

## 2025-06-18 DIAGNOSIS — R76.8 RHEUMATOID FACTOR POSITIVE: ICD-10-CM

## 2025-06-18 DIAGNOSIS — R79.82 ELEVATED C-REACTIVE PROTEIN (CRP): ICD-10-CM

## 2025-06-18 DIAGNOSIS — R70.0 ESR RAISED: ICD-10-CM

## 2025-06-18 DIAGNOSIS — D72.829 LEUKOCYTOSIS, UNSPECIFIED TYPE: ICD-10-CM

## 2025-06-18 DIAGNOSIS — E28.2 PCOS (POLYCYSTIC OVARIAN SYNDROME): ICD-10-CM

## 2025-06-18 DIAGNOSIS — L40.9 PSORIASIS OF NAIL: Primary | ICD-10-CM

## 2025-06-18 DIAGNOSIS — L40.9 PSORIASIS: ICD-10-CM

## 2025-06-18 PROCEDURE — 3074F SYST BP LT 130 MM HG: CPT | Performed by: STUDENT IN AN ORGANIZED HEALTH CARE EDUCATION/TRAINING PROGRAM

## 2025-06-18 PROCEDURE — 99214 OFFICE O/P EST MOD 30 MIN: CPT | Performed by: STUDENT IN AN ORGANIZED HEALTH CARE EDUCATION/TRAINING PROGRAM

## 2025-06-18 PROCEDURE — 3078F DIAST BP <80 MM HG: CPT | Performed by: STUDENT IN AN ORGANIZED HEALTH CARE EDUCATION/TRAINING PROGRAM

## 2025-06-18 NOTE — PROGRESS NOTES
Chief Complaint  Hypertension (Follow up ), Rash (Ongoing but getting better ), and Nail Problem (Discuss left big toe ), annual physical     Subjective        Libby Huerta presents to [facility]    History of Present Illness  The patient presents for evaluation of blood pressure, migraines, toenail psoriasis, fatigue, joint pain, rash, ADHD, and PCOS.    She reports a chaotic lifestyle, currently engaged in full-time architectural studies and extensive yard work during her summer break. She is attempting to manage her stress levels. She has been unable to find a provider for ADHD testing but is currently undergoing talk therapy. She was prescribed Strattera by a psychologist but is hesitant to take it due to concerns about potential blood pressure elevation. She has previously tried acupuncture, which she found beneficial, but has not returned due to scheduling conflicts. She reports difficulty managing her life, often forgetting important tasks and appointments, which she finds frustrating and embarrassing.    Her blood pressure readings at home have been inconsistent, ranging from 160/110 to 130/90, and then back up to 150/100. She is currently on lisinopril 10 mg but did not take her dose today. She took 3 doses yesterday due to a severe migraine. She has been referred to an endocrinologist who advised weight loss for better health outcomes.    She has a history of migraines, which have been a lifelong issue. She recently experienced a severe episode that incapacitated her for an entire day.    She was diagnosed with an ingrown toenail by a podiatrist last year, but she believes it to be toenail psoriasis due to the flaking of the nail and cuticle. She is uncertain if this condition is a side effect of her medication.    She reports persistent fatigue and generalized body pain, including bone pain, which she has been experiencing for approximately 10 years. She has elevated CRP  "levels and white blood cell count. She has undergone an MRI of her hand and x-rays of her neck, which revealed bone spurs and forward tilting of the neck. She believes these neck issues contribute to her headaches. She has been advised to take Tylenol and naproxen for pain management but is reluctant due to concerns about potential liver damage. She was prescribed meloxicam for knee pain but did not take it due to fear of side effects. She has been referred to a rheumatologist for further evaluation.    She has been diagnosed with PCOS and advised to lose weight. She has not tried metformin or spironolactone for this condition. She has irregular menstrual cycles, with her last period occurring 2 weeks ago after several months of amenorrhea. She has been taking an herbal supplement that has helped regulate her cycle.    She reports a blistery rash on her arm, which has improved with the use of a cream. She also has a rash on her abdomen that remains itchy. She has been developing small bumps on her skin. She was prescribed an oral steroid, which initially gave her a significant energy boost, allowing her to work in her yard for 13 hours straight without subsequent fatigue.    GYNECOLOGICAL HISTORY:  - Last Menstrual Period: 06/2025    FAMILY HISTORY  Her mother has autoimmune disorders and allergies.       Objective   Vital Signs:  /78 (BP Location: Left arm, Patient Position: Sitting, Cuff Size: Adult)   Pulse 101   Ht 165.1 cm (65\")   Wt 104 kg (229 lb 6.4 oz)   SpO2 98%   BMI 38.17 kg/m²   Estimated body mass index is 38.17 kg/m² as calculated from the following:    Height as of this encounter: 165.1 cm (65\").    Weight as of this encounter: 104 kg (229 lb 6.4 oz).          Physical Exam  Vitals and nursing note reviewed.   Constitutional:       General: She is not in acute distress.     Appearance: Normal appearance. She is not ill-appearing.   HENT:      Head: Normocephalic and atraumatic.      Nose: " Nose normal.      Mouth/Throat:      Mouth: Mucous membranes are moist.   Eyes:      Extraocular Movements: Extraocular movements intact.      Conjunctiva/sclera: Conjunctivae normal.   Cardiovascular:      Rate and Rhythm: Normal rate and regular rhythm.      Heart sounds: Normal heart sounds. No murmur heard.     No gallop.   Pulmonary:      Effort: Pulmonary effort is normal. No respiratory distress.      Breath sounds: Normal breath sounds. No stridor. No wheezing, rhonchi or rales.   Chest:      Chest wall: No tenderness.   Skin:     General: Skin is warm and dry.      Comments: Toenail abnormality, improving rash   Neurological:      General: No focal deficit present.      Mental Status: She is alert and oriented to person, place, and time. Mental status is at baseline.   Psychiatric:         Mood and Affect: Mood normal.         Behavior: Behavior normal.          Physical Exam  Respiratory: Clear to auscultation, no wheezing, rales or rhonchi       Result Review :               Results  Labs   - CHRIS: 2022, Negative   - Rheumatoid factor: 2022, Slightly high          Assessment and Plan   Diagnoses and all orders for this visit:    1. Psoriasis of nail (Primary)    2. Elevated C-reactive protein (CRP)  -     Lyme Disease Total Antibody With Reflex to Immunoassay  -     EBV Antibody Profile  -     TSH  -     CHRIS With / DsDNA, RNP, Sjogrens A / B, Smith  -     Rheumatoid Factor    3. Leukocytosis, unspecified type  -     Lyme Disease Total Antibody With Reflex to Immunoassay  -     EBV Antibody Profile  -     TSH  -     CHRIS With / DsDNA, RNP, Sjogrens A / B, Smith  -     Rheumatoid Factor    4. Rheumatoid factor positive  -     Lyme Disease Total Antibody With Reflex to Immunoassay  -     EBV Antibody Profile  -     CHRIS With / DsDNA, RNP, Sjogrens A / B, Smith  -     Rheumatoid Factor    5. ESR raised  -     Lyme Disease Total Antibody With Reflex to Immunoassay  -     CHRIS With / DsDNA, RNP, Sjogrens A / B,  Zuñiga  -     Rheumatoid Factor    6. PCOS (polycystic ovarian syndrome)  -     TSH  -     NMR LipoProfile With Lipids and Insulin Resistance Markers (With Graph)  -     Hemoglobin A1c    7. Other fatigue  -     Lyme Disease Total Antibody With Reflex to Immunoassay  -     EBV Antibody Profile  -     TSH  -     CHRIS With / DsDNA, RNP, Sjogrens A / B, Smith  -     NMR LipoProfile With Lipids and Insulin Resistance Markers (With Graph)    8. Brain fog  -     Lyme Disease Total Antibody With Reflex to Immunoassay  -     EBV Antibody Profile  -     TSH  -     CHRIS With / DsDNA, RNP, Sjogrens A / B, Smith  -     NMR LipoProfile With Lipids and Insulin Resistance Markers (With Graph)    9. Psoriasis  -     Apremilast 4 x 10 & 51 x20 MG tablet therapy pack; Take 10 mg by mouth Daily for 1 day. Initial: 10 mg in the morning on day 1. Titrate upward by additional 10 mg per day on days 2 to 5 as follows: Day 2: 10 mg twice daily; Day 3: 10 mg in the morning and 20 mg in the evening; Day 4: 20 mg twice daily; Day 5: 20 mg in the morning and 30 mg in the evening. Maintenance dose: 30 mg twice daily starting on day 6.  Dispense: 55 each; Refill: 0        Assessment & Plan  1.  Hypertension  - Blood pressure readings at home have varied from 160/110 to 130/90. Today's reading is 120/78.  - Currently on lisinopril 10 mg; did not take it today but took 3 doses yesterday due to a migraine.  - Advised to monitor blood pressure regularly and document elevated readings or associated symptoms like headaches.  - Nursing visit will be scheduled to compare home blood pressure cuff readings with office measurements.    2. Migraines.  - Reports severe migraines, a lifelong issue.  - High blood pressure can cause migraines and vice versa.  - Advised to continue current management strategies and document frequency and severity of migraines.    3. Toenail psoriasis.  - Symptoms include nail flaking and cuticle peeling.  - Will try Otezla    4.  Fatigue/joint pain/rash - Rheumatoid Arthritis   - Reports chronic fatigue and generalized body pain.  - Previous autoimmune workup in 2022 showed negative CHRIS but slightly elevated rheumatoid factor. Elevated CRP levels and white blood cell count noted in the past.  - Additional blood work will be ordered today to reassess inflammatory markers, rheumatoid factor, CHRIS, thyroid function, Lyme disease, insulin levels, and other potential causes of symptoms.  - Reports joint pain in hands, knees, neck, wrists, and elbows, radiating to arms and inflamed forward. Morning stiffness lasts 30 minutes to an hour, hair loss, fingers and toes turn white and cold.  - Previous x-rays of hands showed no erosive changes except for a possible cyst or erosion in the left ulnar styloid.  Had hand MRI ordered but has not had this completed.  - Advised to follow up with a rheumatologist to clarify diagnosis and management plan.    5. ADHD.  - Prescribed Strattera for ADHD by outside provider but has not started due to concerns about elevated blood pressure.    6. Polycystic ovary syndrome (PCOS).  - Additional blood work will be ordered today to assess for insulin problems and other potential complications of PCOS.  - Advised to consider trying metformin or spironolactone for PCOS management.            Follow Up   Return for schedule nursing visit to check bp cuff .  Patient was given instructions and counseling regarding her condition or for health maintenance advice. Please see specific information pulled into the AVS if appropriate.           Patient or patient representative verbalized consent for the use of Ambient Listening during the visit with  Juany Huerta DO for chart documentation. 6/19/2025  13:50 EDT

## 2025-06-23 LAB
ANA SER QL: NEGATIVE
B BURGDOR IGG+IGM SER QL IA: NEGATIVE
CHOLEST SERPL-MCNC: NORMAL MG/DL
EBV NA IGG SER IA-ACNC: <18 U/ML (ref 0–17.9)
EBV VCA IGG SER IA-ACNC: 21.1 U/ML (ref 0–17.9)
EBV VCA IGM SER IA-ACNC: <36 U/ML (ref 0–35.9)
HBA1C MFR BLD: 5.4 % (ref 4.8–5.6)
HDL SERPL-SCNC: NORMAL NMOL/L
HDLC SERPL-MCNC: NORMAL MG/DL
LDL SERPL-SCNC: NORMAL NMOL/L
SERVICE CMNT-IMP: ABNORMAL
SPECIMEN STATUS: NORMAL
TRIGL SERPL-MCNC: NORMAL MG/DL
TSH SERPL DL<=0.005 MIU/L-ACNC: 1.35 UIU/ML (ref 0.45–4.5)

## 2025-07-24 ENCOUNTER — OFFICE VISIT (OUTPATIENT)
Dept: FAMILY MEDICINE CLINIC | Facility: CLINIC | Age: 40
End: 2025-07-24
Payer: MEDICAID

## 2025-07-24 VITALS
BODY MASS INDEX: 37.65 KG/M2 | DIASTOLIC BLOOD PRESSURE: 88 MMHG | SYSTOLIC BLOOD PRESSURE: 122 MMHG | HEIGHT: 65 IN | WEIGHT: 226 LBS | HEART RATE: 96 BPM | OXYGEN SATURATION: 99 %

## 2025-07-24 DIAGNOSIS — N30.00 ACUTE CYSTITIS WITHOUT HEMATURIA: Primary | ICD-10-CM

## 2025-07-24 DIAGNOSIS — R51.9 ACUTE NONINTRACTABLE HEADACHE, UNSPECIFIED HEADACHE TYPE: ICD-10-CM

## 2025-07-24 DIAGNOSIS — R30.0 DYSURIA: ICD-10-CM

## 2025-07-24 DIAGNOSIS — I10 HYPERTENSION, UNSPECIFIED TYPE: ICD-10-CM

## 2025-07-24 LAB
BILIRUB BLD-MCNC: NEGATIVE MG/DL
CLARITY, POC: CLEAR
COLOR UR: YELLOW
EXPIRATION DATE: NORMAL
GLUCOSE UR STRIP-MCNC: NEGATIVE MG/DL
KETONES UR QL: NEGATIVE
LEUKOCYTE EST, POC: NEGATIVE
Lab: NORMAL
NITRITE UR-MCNC: NEGATIVE MG/ML
PH UR: 7.5 [PH] (ref 5–8)
PROT UR STRIP-MCNC: NEGATIVE MG/DL
RBC # UR STRIP: NEGATIVE /UL
SP GR UR: 1 (ref 1–1.03)
UROBILINOGEN UR QL: NORMAL

## 2025-07-24 RX ORDER — PHENAZOPYRIDINE HYDROCHLORIDE 200 MG/1
200 TABLET, FILM COATED ORAL 3 TIMES DAILY PRN
Qty: 6 TABLET | Refills: 0 | Status: SHIPPED | OUTPATIENT
Start: 2025-07-24

## 2025-07-24 RX ORDER — NAPROXEN 500 MG/1
500 TABLET ORAL 2 TIMES DAILY WITH MEALS
Qty: 60 TABLET | Refills: 0 | Status: SHIPPED | OUTPATIENT
Start: 2025-07-24 | End: 2025-08-23

## 2025-07-24 RX ORDER — CYCLOBENZAPRINE HCL 10 MG
10 TABLET ORAL NIGHTLY PRN
Qty: 30 TABLET | Refills: 0 | Status: SHIPPED | OUTPATIENT
Start: 2025-07-24

## 2025-07-24 RX ORDER — NITROFURANTOIN 25; 75 MG/1; MG/1
100 CAPSULE ORAL 2 TIMES DAILY
Qty: 14 CAPSULE | Refills: 0 | Status: SHIPPED | OUTPATIENT
Start: 2025-07-24 | End: 2025-07-31

## 2025-07-24 NOTE — PROGRESS NOTES
Chief Complaint  Urinary Tract Infection (Burning) and Hypertension (Check /)    Subjective          History of Present Illness       The patient presents for evaluation of urinary tract symptoms, hypertension, and migraine.    She reports experiencing a burning sensation and discomfort during urination. Additionally, she mentions feeling extremely dehydrated. There is no history of frequent urinary tract infections, with the last occurrence being approximately 15 years ago. She has not experienced any lower back pain or fevers. Her symptoms were less severe yesterday but worsened the day before, to the point where she was unable to leave her home. She has been taking Tylenol for pain relief but has run out of naproxen sodium.    She has not been consistently monitoring her blood pressure at home. When she does check, it typically reads around 130-140/ mmHg. She is currently on lisinopril 10 mg, which she takes 2 to 3 times a week. She mentions difficulty remembering to take her medication regularly.    She experiences migraines, which she believes are related to her sleep patterns. She plans to contact Dr. Coronado's office to arrange for a CPAP machine fitting, as it was determined she needed one. She typically wakes up with migraines and has been awake since 4:00 AM today due to a migraine. She describes her headaches as being located all over her head, extending down to her hairline at the back of her neck, similar to tension headaches. She does not experience vomiting with her headaches but did feel nauseous this morning. She also reports sensitivity to light during her headaches. She is considering trying muscle relaxers at night to see if they help with the tension. She has been taking [place] for relief.          BP Readings from Last 3 Encounters:   07/24/25 122/88   06/18/25 120/78   06/04/25 138/88       Brief Urine Lab Results  (Last result in the past 365 days)        Color   Clarity   Blood    "Leuk Est   Nitrite   Protein   CREAT   Urine HCG        07/24/25 1114 Yellow   Clear   Negative   Negative   Negative   Negative                   Objective   Vital Signs:  /88 (BP Location: Left arm, Patient Position: Sitting, Cuff Size: Adult)   Pulse 96   Ht 165.1 cm (65\")   Wt 103 kg (226 lb)   SpO2 99%   BMI 37.61 kg/m²   Estimated body mass index is 37.61 kg/m² as calculated from the following:    Height as of this encounter: 165.1 cm (65\").    Weight as of this encounter: 103 kg (226 lb).          Physical Exam  Vitals reviewed.   Constitutional:       General: She is not in acute distress.     Appearance: Normal appearance.   HENT:      Head: Normocephalic and atraumatic.      Nose: Nose normal. No congestion.      Mouth/Throat:      Mouth: Mucous membranes are moist.      Pharynx: No oropharyngeal exudate or posterior oropharyngeal erythema.   Eyes:      Conjunctiva/sclera: Conjunctivae normal.      Pupils: Pupils are equal, round, and reactive to light.   Cardiovascular:      Rate and Rhythm: Normal rate and regular rhythm.      Pulses: Normal pulses.      Heart sounds: No murmur heard.     No gallop.   Pulmonary:      Effort: Pulmonary effort is normal. No respiratory distress.      Breath sounds: Normal breath sounds. No wheezing.   Abdominal:      General: Bowel sounds are normal. There is no distension.      Palpations: Abdomen is soft.      Tenderness: There is no abdominal tenderness.   Musculoskeletal:         General: Normal range of motion.      Cervical back: Normal range of motion and neck supple. No tenderness.   Skin:     General: Skin is warm and dry.   Neurological:      Mental Status: She is alert and oriented to person, place, and time. Mental status is at baseline.   Psychiatric:         Mood and Affect: Mood normal.        Result Review :            Labs   - Urine sample: Normal           Assessment and Plan   Diagnoses and all orders for this visit:    1. Acute cystitis " without hematuria (Primary)  -     nitrofurantoin, macrocrystal-monohydrate, (Macrobid) 100 MG capsule; Take 1 capsule by mouth 2 (Two) Times a Day for 7 days.  Dispense: 14 capsule; Refill: 0  -     phenazopyridine (Pyridium) 200 MG tablet; Take 1 tablet by mouth 3 (Three) Times a Day As Needed for Bladder Spasms or Dysuria.  Dispense: 6 tablet; Refill: 0    2. Dysuria  -     POCT urinalysis dipstick, automated  -     Urine Culture - Urine, Urine, Clean Catch    3. Hypertension, unspecified type    4. Acute nonintractable headache, unspecified headache type  -     naproxen (Naprosyn) 500 MG tablet; Take 1 tablet by mouth 2 (Two) Times a Day With Meals for 30 days.  Dispense: 60 tablet; Refill: 0  -     cyclobenzaprine (FLEXERIL) 10 MG tablet; Take 1 tablet by mouth At Night As Needed for Muscle Spasms.  Dispense: 30 tablet; Refill: 0         1. Urinary tract infection.  - Symptoms include burning and discomfort during urination.  - Urine sample will be sent for culture to confirm the diagnosis.  - Macrobid prescribed as antibiotic treatment; Pyridium prescribed to alleviate urinary discomfort, with a warning that it may turn the urine bright orange.  - Advised to maintain hydration and rest; antibiotic may be adjusted based on culture results.  -UA normal in office today.    2. Hypertension.  - Blood pressure readings at home have been around 130-140/.  - Currently taking lisinopril 10 mg but admits to inconsistent use, taking it only 2-3 times a week.  - Advised to obtain a new upper arm blood pressure cuff for more accurate readings. Her home cuff is reading significantly higher than manual cuff today in office.  - Recommended to take lisinopril consistently as prescribed.    3. Migraine.  - Experiences migraines, often associated with light sensitivity and nausea.  - Advised to consider using a CPAP machine as recommended by Dr. Coronado's office, as migraines may be related to sleep issues.  - Naproxen  prescribed for headache management, to be taken up to twice daily.  - Flexeril prescribed for muscle relaxation at night, with instructions to take it as needed and to halve the dose if it causes grogginess the next day.         Follow Up   Return if symptoms worsen or fail to improve.  Patient was given instructions and counseling regarding her condition or for health maintenance advice. Please see specific information pulled into the AVS if appropriate.     Patient or patient representative verbalized consent for the use of Ambient Listening during the visit with  Raquel Townsend APRN for chart documentation. 7/27/2025  22:33 EDT

## 2025-07-27 PROBLEM — R51.9 ACUTE NONINTRACTABLE HEADACHE: Status: ACTIVE | Noted: 2025-07-27

## 2025-07-29 LAB
BACTERIA UR CULT: ABNORMAL
BACTERIA UR CULT: ABNORMAL
OTHER ANTIBIOTIC SUSC ISLT: ABNORMAL

## 2025-08-19 DIAGNOSIS — R51.9 ACUTE NONINTRACTABLE HEADACHE, UNSPECIFIED HEADACHE TYPE: ICD-10-CM

## 2025-08-19 RX ORDER — NAPROXEN 500 MG/1
500 TABLET ORAL 2 TIMES DAILY WITH MEALS
Qty: 60 TABLET | Refills: 0 | Status: SHIPPED | OUTPATIENT
Start: 2025-08-19

## 2025-08-28 ENCOUNTER — OFFICE VISIT (OUTPATIENT)
Dept: FAMILY MEDICINE CLINIC | Facility: CLINIC | Age: 40
End: 2025-08-28
Payer: MEDICAID

## 2025-08-28 VITALS
HEIGHT: 65 IN | OXYGEN SATURATION: 99 % | DIASTOLIC BLOOD PRESSURE: 90 MMHG | WEIGHT: 227 LBS | BODY MASS INDEX: 37.82 KG/M2 | SYSTOLIC BLOOD PRESSURE: 136 MMHG | RESPIRATION RATE: 16 BRPM | TEMPERATURE: 98 F | HEART RATE: 90 BPM

## 2025-08-28 DIAGNOSIS — R30.0 DYSURIA: ICD-10-CM

## 2025-08-28 DIAGNOSIS — I10 HYPERTENSION, UNSPECIFIED TYPE: ICD-10-CM

## 2025-08-28 DIAGNOSIS — R51.9 ACUTE NONINTRACTABLE HEADACHE, UNSPECIFIED HEADACHE TYPE: ICD-10-CM

## 2025-08-28 DIAGNOSIS — I10 PRIMARY HYPERTENSION: Primary | ICD-10-CM

## 2025-08-28 PROCEDURE — 3075F SYST BP GE 130 - 139MM HG: CPT | Performed by: FAMILY MEDICINE

## 2025-08-28 PROCEDURE — 1126F AMNT PAIN NOTED NONE PRSNT: CPT | Performed by: FAMILY MEDICINE

## 2025-08-28 PROCEDURE — 3080F DIAST BP >= 90 MM HG: CPT | Performed by: FAMILY MEDICINE

## 2025-08-28 PROCEDURE — 1159F MED LIST DOCD IN RCRD: CPT | Performed by: FAMILY MEDICINE

## 2025-08-28 PROCEDURE — 99214 OFFICE O/P EST MOD 30 MIN: CPT | Performed by: FAMILY MEDICINE

## 2025-08-28 PROCEDURE — 1160F RVW MEDS BY RX/DR IN RCRD: CPT | Performed by: FAMILY MEDICINE

## 2025-08-28 RX ORDER — CYCLOBENZAPRINE HCL 10 MG
10 TABLET ORAL NIGHTLY PRN
Qty: 30 TABLET | Refills: 0 | Status: SHIPPED | OUTPATIENT
Start: 2025-08-28

## 2025-08-28 RX ORDER — LISINOPRIL 10 MG/1
10 TABLET ORAL DAILY
Qty: 90 TABLET | Refills: 3 | Status: SHIPPED | OUTPATIENT
Start: 2025-08-28

## 2025-08-30 ENCOUNTER — RESULTS FOLLOW-UP (OUTPATIENT)
Dept: FAMILY MEDICINE CLINIC | Facility: CLINIC | Age: 40
End: 2025-08-30
Payer: MEDICAID

## 2025-08-30 LAB
BACTERIA UR CULT: ABNORMAL
BACTERIA UR CULT: ABNORMAL

## 2025-08-30 RX ORDER — AZITHROMYCIN 250 MG/1
TABLET, FILM COATED ORAL
Qty: 6 TABLET | Refills: 0 | Status: SHIPPED | OUTPATIENT
Start: 2025-08-30 | End: 2025-08-30

## 2025-08-30 RX ORDER — AZITHROMYCIN 250 MG/1
TABLET, FILM COATED ORAL
Qty: 6 TABLET | Refills: 0 | Status: SHIPPED | OUTPATIENT
Start: 2025-08-30

## (undated) DEVICE — ADHS SKIN SURG TISS VISC PREMIERPRO EXOFIN HI/VISC FAST/DRY

## (undated) DEVICE — ANTIBACTERIAL UNDYED BRAIDED (POLYGLACTIN 910), SYNTHETIC ABSORBABLE SUTURE: Brand: COATED VICRYL

## (undated) DEVICE — SUT VIC 3/0 TIES 18IN J110T

## (undated) DEVICE — VIOLET BRAIDED (POLYGLACTIN 910), SYNTHETIC ABSORBABLE SUTURE: Brand: COATED VICRYL

## (undated) DEVICE — ENDOPATH XCEL BLADELESS TROCARS WITH STABILITY SLEEVES: Brand: ENDOPATH XCEL

## (undated) DEVICE — PENCL E/S ULTRAVAC TELESCP NOSE HOLSTR 10FT

## (undated) DEVICE — TRAP FLD MINIVAC MEGADYNE 100ML

## (undated) DEVICE — VISUALIZATION SYSTEM: Brand: CLEARIFY

## (undated) DEVICE — ENDOPATH XCEL BLUNT TIP TROCARS WITH SMOOTH SLEEVES: Brand: ENDOPATH XCEL

## (undated) DEVICE — ENDOCUT SCISSOR TIP, DISPOSABLE: Brand: RENEW

## (undated) DEVICE — SUT VIC 0/0 UR6 27IN DYED J603H

## (undated) DEVICE — HARMONIC ACE +7 LAPAROSCOPIC SHEARS ADVANCED HEMOSTASIS 5MM DIAMETER 36CM SHAFT LENGTH  FOR USE WITH GRAY HAND PIECE ONLY: Brand: HARMONIC ACE

## (undated) DEVICE — APPL CHLORAPREP HI/LITE 26ML ORNG

## (undated) DEVICE — GLV SURG BIOGEL LTX PF 6 1/2

## (undated) DEVICE — STPLR SKIN VISISTAT WD 35CT

## (undated) DEVICE — ENDOPOUCH RETRIEVER SPECIMEN RETRIEVAL BAGS: Brand: ENDOPOUCH RETRIEVER

## (undated) DEVICE — PK PROC MAJ 40

## (undated) DEVICE — ECHELON FLEX45 ENDOPATH STAPLER, ARTICULATING ENDOSCOPIC LINEAR CUTTER (NO CARTRIDGE): Brand: ECHELON ENDOPATH

## (undated) DEVICE — POOLE SUCTION HANDLE: Brand: CARDINAL HEALTH

## (undated) DEVICE — SUT VIC 2/0 TIES 18IN J111T

## (undated) DEVICE — LOU LAP CHOLE: Brand: MEDLINE INDUSTRIES, INC.

## (undated) DEVICE — PENCL ES MEGADINE EZ/CLEAN BUTN W/HOLSTR 10FT

## (undated) DEVICE — SUT PDS 0 CT 36IN DYED Z358T

## (undated) DEVICE — LAPAROSCOPIC SMOKE FILTRATION SYSTEM: Brand: PALL LAPAROSHIELD® PLUS LAPAROSCOPIC SMOKE FILTRATION SYSTEM

## (undated) DEVICE — TOTAL TRAY, 16FR 10ML SIL FOLEY, URN: Brand: MEDLINE

## (undated) DEVICE — ENDOPATH XCEL UNIVERSAL TROCAR STABLILITY SLEEVES: Brand: ENDOPATH XCEL